# Patient Record
Sex: MALE | Race: WHITE | NOT HISPANIC OR LATINO | Employment: FULL TIME | URBAN - METROPOLITAN AREA
[De-identification: names, ages, dates, MRNs, and addresses within clinical notes are randomized per-mention and may not be internally consistent; named-entity substitution may affect disease eponyms.]

---

## 2017-01-13 ENCOUNTER — ALLSCRIPTS OFFICE VISIT (OUTPATIENT)
Dept: OTHER | Facility: OTHER | Age: 57
End: 2017-01-13

## 2017-06-13 DIAGNOSIS — M10.9 GOUT: ICD-10-CM

## 2017-06-13 DIAGNOSIS — M12.9 ARTHROPATHY: ICD-10-CM

## 2017-07-21 ENCOUNTER — ALLSCRIPTS OFFICE VISIT (OUTPATIENT)
Dept: OTHER | Facility: OTHER | Age: 57
End: 2017-07-21

## 2017-07-21 DIAGNOSIS — M75.81 OTHER SHOULDER LESIONS, RIGHT SHOULDER: ICD-10-CM

## 2017-11-21 ENCOUNTER — GENERIC CONVERSION - ENCOUNTER (OUTPATIENT)
Dept: OTHER | Facility: OTHER | Age: 57
End: 2017-11-21

## 2017-11-21 DIAGNOSIS — Z13.0 ENCOUNTER FOR SCREENING FOR DISEASES OF THE BLOOD AND BLOOD-FORMING ORGANS AND CERTAIN DISORDERS INVOLVING THE IMMUNE MECHANISM: ICD-10-CM

## 2017-11-21 DIAGNOSIS — Z12.5 ENCOUNTER FOR SCREENING FOR MALIGNANT NEOPLASM OF PROSTATE: ICD-10-CM

## 2017-11-21 DIAGNOSIS — M10.9 GOUT: ICD-10-CM

## 2017-11-21 DIAGNOSIS — R76.8 OTHER SPECIFIED ABNORMAL IMMUNOLOGICAL FINDINGS IN SERUM: ICD-10-CM

## 2017-11-21 DIAGNOSIS — M12.9 ARTHROPATHY: ICD-10-CM

## 2018-01-13 VITALS
WEIGHT: 260 LBS | HEIGHT: 71 IN | OXYGEN SATURATION: 97 % | HEART RATE: 80 BPM | DIASTOLIC BLOOD PRESSURE: 78 MMHG | SYSTOLIC BLOOD PRESSURE: 116 MMHG | BODY MASS INDEX: 36.4 KG/M2

## 2018-01-14 VITALS
HEIGHT: 71 IN | TEMPERATURE: 98.3 F | HEART RATE: 83 BPM | SYSTOLIC BLOOD PRESSURE: 140 MMHG | OXYGEN SATURATION: 95 % | WEIGHT: 262 LBS | DIASTOLIC BLOOD PRESSURE: 88 MMHG | RESPIRATION RATE: 14 BRPM | BODY MASS INDEX: 36.68 KG/M2

## 2018-01-22 VITALS
SYSTOLIC BLOOD PRESSURE: 138 MMHG | BODY MASS INDEX: 36.68 KG/M2 | HEART RATE: 98 BPM | OXYGEN SATURATION: 98 % | DIASTOLIC BLOOD PRESSURE: 90 MMHG | HEIGHT: 71 IN | WEIGHT: 262 LBS

## 2018-03-06 ENCOUNTER — TELEPHONE (OUTPATIENT)
Dept: FAMILY MEDICINE CLINIC | Facility: CLINIC | Age: 58
End: 2018-03-06

## 2018-03-08 DIAGNOSIS — G89.4 CHRONIC PAIN SYNDROME: Primary | ICD-10-CM

## 2018-03-08 RX ORDER — OXYCODONE HYDROCHLORIDE AND ACETAMINOPHEN 5; 325 MG/1; MG/1
1 TABLET ORAL EVERY 4 HOURS PRN
Qty: 50 TABLET | Refills: 0 | Status: SHIPPED | OUTPATIENT
Start: 2018-03-08 | End: 2018-04-30 | Stop reason: SDUPTHER

## 2018-03-08 RX ORDER — OXYCODONE HYDROCHLORIDE AND ACETAMINOPHEN 5; 325 MG/1; MG/1
1 TABLET ORAL
COMMUNITY
Start: 2016-09-20 | End: 2018-03-08 | Stop reason: SDUPTHER

## 2018-03-16 DIAGNOSIS — K21.9 GASTROESOPHAGEAL REFLUX DISEASE, ESOPHAGITIS PRESENCE NOT SPECIFIED: Primary | ICD-10-CM

## 2018-03-18 RX ORDER — OMEPRAZOLE 40 MG/1
CAPSULE, DELAYED RELEASE ORAL
Qty: 90 CAPSULE | Refills: 5 | Status: SHIPPED | OUTPATIENT
Start: 2018-03-18 | End: 2018-05-30 | Stop reason: SDUPTHER

## 2018-04-25 DIAGNOSIS — M10.9 GOUT, UNSPECIFIED CAUSE, UNSPECIFIED CHRONICITY, UNSPECIFIED SITE: Primary | ICD-10-CM

## 2018-04-26 RX ORDER — COLCHICINE 0.6 MG/1
TABLET, FILM COATED ORAL
Qty: 200 TABLET | Refills: 5 | Status: SHIPPED | OUTPATIENT
Start: 2018-04-26 | End: 2018-07-12 | Stop reason: SDUPTHER

## 2018-04-30 DIAGNOSIS — G89.4 CHRONIC PAIN SYNDROME: ICD-10-CM

## 2018-04-30 RX ORDER — OXYCODONE HYDROCHLORIDE AND ACETAMINOPHEN 5; 325 MG/1; MG/1
1 TABLET ORAL EVERY 4 HOURS PRN
Qty: 50 TABLET | Refills: 0 | Status: SHIPPED | OUTPATIENT
Start: 2018-04-30 | End: 2018-05-30 | Stop reason: SDUPTHER

## 2018-05-01 DIAGNOSIS — M25.50 ARTHRALGIA, UNSPECIFIED JOINT: Primary | ICD-10-CM

## 2018-05-01 RX ORDER — INDOMETHACIN 50 MG/1
50 CAPSULE ORAL EVERY 8 HOURS
Qty: 30 CAPSULE | Refills: 0 | Status: SHIPPED | OUTPATIENT
Start: 2018-05-01 | End: 2018-07-12 | Stop reason: SDUPTHER

## 2018-05-01 RX ORDER — INDOMETHACIN 50 MG/1
1 CAPSULE ORAL EVERY 8 HOURS
COMMUNITY
Start: 2015-09-14 | End: 2018-05-01 | Stop reason: SDUPTHER

## 2018-05-07 RX ORDER — NAPROXEN 500 MG/1
1 TABLET ORAL 2 TIMES DAILY
COMMUNITY
Start: 2017-07-21 | End: 2018-10-09 | Stop reason: SDUPTHER

## 2018-05-07 RX ORDER — LEVOCETIRIZINE DIHYDROCHLORIDE 5 MG/1
5 TABLET, FILM COATED ORAL EVERY EVENING
COMMUNITY
Start: 2018-03-20

## 2018-05-11 ENCOUNTER — OFFICE VISIT (OUTPATIENT)
Dept: FAMILY MEDICINE CLINIC | Facility: CLINIC | Age: 58
End: 2018-05-11
Payer: COMMERCIAL

## 2018-05-11 VITALS
TEMPERATURE: 97.9 F | BODY MASS INDEX: 38.05 KG/M2 | RESPIRATION RATE: 18 BRPM | WEIGHT: 269 LBS | HEART RATE: 90 BPM | OXYGEN SATURATION: 97 %

## 2018-05-11 DIAGNOSIS — M77.8 TENDINITIS OF RIGHT SHOULDER: Primary | ICD-10-CM

## 2018-05-11 DIAGNOSIS — R53.83 FATIGUE, UNSPECIFIED TYPE: ICD-10-CM

## 2018-05-11 DIAGNOSIS — Z12.5 PROSTATE CANCER SCREENING: ICD-10-CM

## 2018-05-11 DIAGNOSIS — E78.5 DYSLIPIDEMIA: ICD-10-CM

## 2018-05-11 DIAGNOSIS — K21.9 GASTROESOPHAGEAL REFLUX DISEASE, ESOPHAGITIS PRESENCE NOT SPECIFIED: ICD-10-CM

## 2018-05-11 DIAGNOSIS — M10.9 GOUT, UNSPECIFIED CAUSE, UNSPECIFIED CHRONICITY, UNSPECIFIED SITE: ICD-10-CM

## 2018-05-11 PROBLEM — J30.9 ALLERGIC RHINITIS: Status: ACTIVE | Noted: 2017-07-03

## 2018-05-11 PROBLEM — R76.8 ELEVATED RHEUMATOID FACTOR: Status: ACTIVE | Noted: 2017-11-21

## 2018-05-11 PROCEDURE — 96372 THER/PROPH/DIAG INJ SC/IM: CPT | Performed by: FAMILY MEDICINE

## 2018-05-11 PROCEDURE — 99214 OFFICE O/P EST MOD 30 MIN: CPT | Performed by: FAMILY MEDICINE

## 2018-05-11 PROCEDURE — 20610 DRAIN/INJ JOINT/BURSA W/O US: CPT | Performed by: FAMILY MEDICINE

## 2018-05-11 RX ORDER — CYANOCOBALAMIN 1000 UG/ML
1000 INJECTION INTRAMUSCULAR; SUBCUTANEOUS
Status: SHIPPED | OUTPATIENT
Start: 2018-05-11

## 2018-05-11 RX ORDER — METHYLPREDNISOLONE ACETATE 40 MG/ML
1 INJECTION, SUSPENSION INTRA-ARTICULAR; INTRALESIONAL; INTRAMUSCULAR; SOFT TISSUE
Status: COMPLETED | OUTPATIENT
Start: 2018-05-11 | End: 2018-05-11

## 2018-05-11 RX ADMIN — METHYLPREDNISOLONE ACETATE 1 ML: 40 INJECTION, SUSPENSION INTRA-ARTICULAR; INTRALESIONAL; INTRAMUSCULAR; SOFT TISSUE at 16:10

## 2018-05-11 RX ADMIN — CYANOCOBALAMIN 1000 MCG: 1000 INJECTION INTRAMUSCULAR; SUBCUTANEOUS at 16:43

## 2018-05-11 NOTE — PROGRESS NOTES
Assessment/Plan:           Problem List Items Addressed This Visit     Dyslipidemia    Relevant Orders    Comprehensive metabolic panel    Lipid panel    Gout     Continue Colcrys         Relevant Orders    Uric acid    Tendinitis of right shoulder    Acid reflux     Continue omeprazole         Fatigue - Primary    Relevant Orders    TSH, 3rd generation with T4 reflex    CBC and differential    Lyme Antibody Profile with reflex to WB      Other Visit Diagnoses     Prostate cancer screening        Relevant Orders    PSA, Total Screen            Subjective:      Patient ID: Skyler Shaver is a 66 y o  male  Patient comes in for checkup  He complains of a sore right shoulder and problems with fatigue  The following portions of the patient's history were reviewed and updated as appropriate: allergies, current medications, past family history, past medical history, past social history, past surgical history and problem list     Review of Systems   Constitutional: Positive for fatigue  Negative for fever  HENT: Negative  Respiratory: Negative  Cardiovascular: Negative  Objective:      Pulse 90   Temp 97 9 °F (36 6 °C)   Resp 18   Wt 122 kg (269 lb)   SpO2 97%   BMI 38 05 kg/m²          Physical Exam   Constitutional: He is oriented to person, place, and time  He appears well-developed and well-nourished  HENT:   Head: Normocephalic and atraumatic  Right Ear: Tympanic membrane normal    Left Ear: Tympanic membrane normal    Eyes: EOM are normal  Pupils are equal, round, and reactive to light  Neck: Neck supple  Cardiovascular: Normal rate, regular rhythm and normal heart sounds  Pulmonary/Chest: Effort normal and breath sounds normal    Abdominal: Soft  Bowel sounds are normal    Musculoskeletal: Normal range of motion  Painful abduction of right shoulder   Neurological: He is alert and oriented to person, place, and time  Skin: Skin is warm and dry     Psychiatric: He has a normal mood and affect  Thought content normal    Nursing note and vitals reviewed      Large joint arthrocentesis  Date/Time: 5/11/2018 4:10 PM  Consent given by: patient  Site marked: site marked  Timeout: Immediately prior to procedure a time out was called to verify the correct patient, procedure, equipment, support staff and site/side marked as required   Supporting Documentation  Indications: pain   Procedure Details  Location: shoulder - R glenohumeral  Preparation: Patient was prepped and draped in the usual sterile fashion  Needle size: 25 G  Ultrasound guidance: no  Approach: posterior  Medications administered: 1 mL methylPREDNISolone acetate 40 mg/mL; 1 mL methylPREDNISolone acetate 40 mg/mL    Patient tolerance: patient tolerated the procedure well with no immediate complications  Dressing:  Sterile dressing applied

## 2018-05-30 ENCOUNTER — TELEPHONE (OUTPATIENT)
Dept: FAMILY MEDICINE CLINIC | Facility: CLINIC | Age: 58
End: 2018-05-30

## 2018-05-30 DIAGNOSIS — K21.9 GASTROESOPHAGEAL REFLUX DISEASE, ESOPHAGITIS PRESENCE NOT SPECIFIED: ICD-10-CM

## 2018-05-30 DIAGNOSIS — G89.4 CHRONIC PAIN SYNDROME: ICD-10-CM

## 2018-05-30 RX ORDER — OMEPRAZOLE 40 MG/1
40 CAPSULE, DELAYED RELEASE ORAL DAILY
Qty: 90 CAPSULE | Refills: 3 | Status: SHIPPED | OUTPATIENT
Start: 2018-05-30 | End: 2019-05-01 | Stop reason: SDUPTHER

## 2018-05-30 RX ORDER — OXYCODONE HYDROCHLORIDE AND ACETAMINOPHEN 5; 325 MG/1; MG/1
1 TABLET ORAL EVERY 4 HOURS PRN
Qty: 50 TABLET | Refills: 0 | Status: SHIPPED | OUTPATIENT
Start: 2018-05-30 | End: 2018-06-01 | Stop reason: SDUPTHER

## 2018-05-30 NOTE — TELEPHONE ENCOUNTER
PDMP ok  Patient would like both rx's sent to [facility]  Let Елена know if this can be done   [phone]

## 2018-06-01 DIAGNOSIS — G89.4 CHRONIC PAIN SYNDROME: ICD-10-CM

## 2018-06-01 RX ORDER — OXYCODONE HYDROCHLORIDE AND ACETAMINOPHEN 5; 325 MG/1; MG/1
1 TABLET ORAL EVERY 4 HOURS PRN
Qty: 50 TABLET | Refills: 0 | Status: SHIPPED | OUTPATIENT
Start: 2018-06-01 | End: 2018-06-06 | Stop reason: SDUPTHER

## 2018-06-01 NOTE — TELEPHONE ENCOUNTER
Prescription for Percocet was printed on 5/30  We are unable to find it  Pt stopped by to p/u  Can you please send it to [address] [place] [address] in [facility]? Since we are not able to locate Rx, it was printed on 5/30 at 8:53 pm?    Please advise

## 2018-06-06 DIAGNOSIS — G89.4 CHRONIC PAIN SYNDROME: ICD-10-CM

## 2018-06-07 RX ORDER — OXYCODONE HYDROCHLORIDE AND ACETAMINOPHEN 5; 325 MG/1; MG/1
1 TABLET ORAL EVERY 4 HOURS PRN
Qty: 50 TABLET | Refills: 0 | Status: SHIPPED | OUTPATIENT
Start: 2018-06-07 | End: 2018-07-23 | Stop reason: SDUPTHER

## 2018-07-12 ENCOUNTER — TELEPHONE (OUTPATIENT)
Dept: FAMILY MEDICINE CLINIC | Facility: CLINIC | Age: 58
End: 2018-07-12

## 2018-07-12 DIAGNOSIS — M10.9 GOUT, UNSPECIFIED CAUSE, UNSPECIFIED CHRONICITY, UNSPECIFIED SITE: Primary | ICD-10-CM

## 2018-07-12 DIAGNOSIS — M25.50 ARTHRALGIA, UNSPECIFIED JOINT: ICD-10-CM

## 2018-07-12 RX ORDER — COLCHICINE 0.6 MG/1
TABLET ORAL
Qty: 200 TABLET | Refills: 0 | Status: SHIPPED | OUTPATIENT
Start: 2018-07-12 | End: 2019-06-29 | Stop reason: SDUPTHER

## 2018-07-12 RX ORDER — INDOMETHACIN 50 MG/1
50 CAPSULE ORAL
Qty: 30 CAPSULE | Refills: 3 | Status: SHIPPED | OUTPATIENT
Start: 2018-07-12 | End: 2019-09-10

## 2018-07-23 DIAGNOSIS — G89.4 CHRONIC PAIN SYNDROME: ICD-10-CM

## 2018-07-23 RX ORDER — OXYCODONE HYDROCHLORIDE AND ACETAMINOPHEN 5; 325 MG/1; MG/1
1 TABLET ORAL EVERY 4 HOURS PRN
Qty: 50 TABLET | Refills: 0 | Status: SHIPPED | OUTPATIENT
Start: 2018-07-23 | End: 2018-07-24 | Stop reason: SDUPTHER

## 2018-07-24 DIAGNOSIS — G89.4 CHRONIC PAIN SYNDROME: ICD-10-CM

## 2018-07-24 RX ORDER — OXYCODONE HYDROCHLORIDE AND ACETAMINOPHEN 5; 325 MG/1; MG/1
1 TABLET ORAL EVERY 4 HOURS PRN
Qty: 50 TABLET | Refills: 0 | Status: SHIPPED | OUTPATIENT
Start: 2018-07-24 | End: 2018-09-04 | Stop reason: SDUPTHER

## 2018-09-04 DIAGNOSIS — G89.4 CHRONIC PAIN SYNDROME: ICD-10-CM

## 2018-09-04 RX ORDER — OXYCODONE HYDROCHLORIDE AND ACETAMINOPHEN 5; 325 MG/1; MG/1
1 TABLET ORAL EVERY 4 HOURS PRN
Qty: 50 TABLET | Refills: 0 | Status: SHIPPED | OUTPATIENT
Start: 2018-09-04 | End: 2018-10-09 | Stop reason: SDUPTHER

## 2018-09-18 ENCOUNTER — TELEPHONE (OUTPATIENT)
Dept: FAMILY MEDICINE CLINIC | Facility: CLINIC | Age: 58
End: 2018-09-18

## 2018-09-20 ENCOUNTER — OFFICE VISIT (OUTPATIENT)
Dept: FAMILY MEDICINE CLINIC | Facility: CLINIC | Age: 58
End: 2018-09-20
Payer: COMMERCIAL

## 2018-09-20 VITALS
WEIGHT: 262 LBS | SYSTOLIC BLOOD PRESSURE: 130 MMHG | HEART RATE: 76 BPM | OXYGEN SATURATION: 94 % | BODY MASS INDEX: 37.51 KG/M2 | DIASTOLIC BLOOD PRESSURE: 88 MMHG | TEMPERATURE: 98.8 F | HEIGHT: 70 IN

## 2018-09-20 DIAGNOSIS — J30.9 ALLERGIC RHINITIS, UNSPECIFIED SEASONALITY, UNSPECIFIED TRIGGER: ICD-10-CM

## 2018-09-20 DIAGNOSIS — M10.9 GOUT, UNSPECIFIED CAUSE, UNSPECIFIED CHRONICITY, UNSPECIFIED SITE: ICD-10-CM

## 2018-09-20 DIAGNOSIS — M77.8 TENDINITIS OF RIGHT SHOULDER: Primary | ICD-10-CM

## 2018-09-20 DIAGNOSIS — M77.8 TENDONITIS OF SHOULDER, LEFT: ICD-10-CM

## 2018-09-20 DIAGNOSIS — K21.9 GASTROESOPHAGEAL REFLUX DISEASE, ESOPHAGITIS PRESENCE NOT SPECIFIED: ICD-10-CM

## 2018-09-20 PROCEDURE — 20610 DRAIN/INJ JOINT/BURSA W/O US: CPT | Performed by: FAMILY MEDICINE

## 2018-09-20 PROCEDURE — 99214 OFFICE O/P EST MOD 30 MIN: CPT | Performed by: FAMILY MEDICINE

## 2018-09-20 PROCEDURE — 3008F BODY MASS INDEX DOCD: CPT | Performed by: FAMILY MEDICINE

## 2018-09-20 RX ORDER — METHYLPREDNISOLONE ACETATE 40 MG/ML
1 INJECTION, SUSPENSION INTRA-ARTICULAR; INTRALESIONAL; INTRAMUSCULAR; SOFT TISSUE
Status: COMPLETED | OUTPATIENT
Start: 2018-09-20 | End: 2018-09-20

## 2018-09-20 RX ADMIN — METHYLPREDNISOLONE ACETATE 1 ML: 40 INJECTION, SUSPENSION INTRA-ARTICULAR; INTRALESIONAL; INTRAMUSCULAR; SOFT TISSUE at 17:57

## 2018-09-20 NOTE — PROGRESS NOTES
Assessment/Plan:    Return visit in 6 months       Problem List Items Addressed This Visit     Gout     Continue colchicine 0 6 mg b i d  Tendinitis of right shoulder - Primary    Relevant Orders    XR shoulder 2+ vw right    Allergic rhinitis     Continue Xyzal 5 mg daily         Acid reflux     Continue Prilosec 40 mg daily         Tendonitis of shoulder, left    Relevant Orders    XR shoulder 2+ vw left            Subjective:      Patient ID: Stevan Bartlett is a 66 y o  male  Patient comes in for checkup  He complains of bilateral shoulder pain  He got good relief from right shoulder pain with cortisone injection and he wishes both his shoulders to be injected  The following portions of the patient's history were reviewed and updated as appropriate: allergies, current medications, past family history, past medical history, past social history, past surgical history and problem list     Review of Systems   Constitutional: Negative  HENT: Negative  Respiratory: Negative  Cardiovascular: Negative  Objective:      /88   Pulse 76   Temp 98 8 °F (37 1 °C)   Ht 5' 10" (1 778 m)   Wt 119 kg (262 lb)   SpO2 94%   BMI 37 59 kg/m²            Physical Exam   Constitutional: He is oriented to person, place, and time  He appears well-developed and well-nourished  HENT:   Head: Normocephalic and atraumatic  Right Ear: Tympanic membrane normal    Left Ear: Tympanic membrane normal    Eyes: EOM are normal  Pupils are equal, round, and reactive to light  Neck: Neck supple  Cardiovascular: Normal rate, regular rhythm and normal heart sounds  Pulmonary/Chest: Effort normal and breath sounds normal    Abdominal: Soft  Bowel sounds are normal    Musculoskeletal: Normal range of motion  Painful abduction and flexion of shoulders  Neurological: He is alert and oriented to person, place, and time  Skin: Skin is warm and dry  Psychiatric: He has a normal mood and affect  Thought content normal      Large joint arthrocentesis  Date/Time: 9/20/2018 5:57 PM  Consent given by: patient  Site marked: site marked  Timeout: Immediately prior to procedure a time out was called to verify the correct patient, procedure, equipment, support staff and site/side marked as required   Supporting Documentation  Indications: pain   Procedure Details  Location: shoulder - R glenohumeral  Preparation: Patient was prepped and draped in the usual sterile fashion  Needle size: 25 G  Approach: posterior  Medications administered: 1 mL methylPREDNISolone acetate 40 mg/mL    Patient tolerance: patient tolerated the procedure well with no immediate complications  Dressing:  Sterile dressing applied

## 2018-10-09 DIAGNOSIS — M54.5 LOW BACK PAIN, UNSPECIFIED BACK PAIN LATERALITY, UNSPECIFIED CHRONICITY, WITH SCIATICA PRESENCE UNSPECIFIED: ICD-10-CM

## 2018-10-09 DIAGNOSIS — G89.4 CHRONIC PAIN SYNDROME: ICD-10-CM

## 2018-10-09 DIAGNOSIS — M54.5 LOW BACK PAIN, UNSPECIFIED BACK PAIN LATERALITY, UNSPECIFIED CHRONICITY, WITH SCIATICA PRESENCE UNSPECIFIED: Primary | ICD-10-CM

## 2018-10-09 RX ORDER — OXYCODONE HYDROCHLORIDE AND ACETAMINOPHEN 5; 325 MG/1; MG/1
1 TABLET ORAL EVERY 4 HOURS PRN
Qty: 50 TABLET | Refills: 0 | Status: SHIPPED | OUTPATIENT
Start: 2018-10-09 | End: 2018-11-13 | Stop reason: SDUPTHER

## 2018-10-09 RX ORDER — NAPROXEN 500 MG/1
500 TABLET ORAL 2 TIMES DAILY WITH MEALS
Qty: 180 TABLET | Refills: 3 | Status: SHIPPED | OUTPATIENT
Start: 2018-10-09 | End: 2019-05-13 | Stop reason: SDUPTHER

## 2018-10-09 RX ORDER — NAPROXEN 500 MG/1
TABLET ORAL
Qty: 180 TABLET | Refills: 3 | Status: SHIPPED | OUTPATIENT
Start: 2018-10-09 | End: 2018-10-09 | Stop reason: SDUPTHER

## 2018-10-09 RX ORDER — OXYCODONE HYDROCHLORIDE AND ACETAMINOPHEN 5; 325 MG/1; MG/1
1 TABLET ORAL EVERY 4 HOURS PRN
Qty: 50 TABLET | Refills: 0 | Status: SHIPPED | OUTPATIENT
Start: 2018-10-09 | End: 2018-10-09 | Stop reason: SDUPTHER

## 2018-11-13 DIAGNOSIS — G89.4 CHRONIC PAIN SYNDROME: ICD-10-CM

## 2018-11-13 RX ORDER — OXYCODONE HYDROCHLORIDE AND ACETAMINOPHEN 5; 325 MG/1; MG/1
1 TABLET ORAL EVERY 4 HOURS PRN
Qty: 50 TABLET | Refills: 0 | Status: SHIPPED | OUTPATIENT
Start: 2018-11-13 | End: 2018-11-13 | Stop reason: SDUPTHER

## 2018-11-13 RX ORDER — OXYCODONE HYDROCHLORIDE AND ACETAMINOPHEN 5; 325 MG/1; MG/1
1 TABLET ORAL EVERY 4 HOURS PRN
Qty: 50 TABLET | Refills: 0 | Status: SHIPPED | OUTPATIENT
Start: 2018-11-13 | End: 2018-12-14 | Stop reason: SDUPTHER

## 2018-11-13 NOTE — TELEPHONE ENCOUNTER
Pt's Oxycodone went [facility]  Please send it to [facility] in [facility]

## 2018-12-14 DIAGNOSIS — G89.4 CHRONIC PAIN SYNDROME: ICD-10-CM

## 2018-12-14 RX ORDER — OXYCODONE HYDROCHLORIDE AND ACETAMINOPHEN 5; 325 MG/1; MG/1
1 TABLET ORAL EVERY 4 HOURS PRN
Qty: 50 TABLET | Refills: 0 | Status: SHIPPED | OUTPATIENT
Start: 2018-12-14 | End: 2019-01-16 | Stop reason: SDUPTHER

## 2019-01-16 DIAGNOSIS — G89.4 CHRONIC PAIN SYNDROME: ICD-10-CM

## 2019-01-16 RX ORDER — OXYCODONE HYDROCHLORIDE AND ACETAMINOPHEN 5; 325 MG/1; MG/1
1 TABLET ORAL EVERY 4 HOURS PRN
Qty: 50 TABLET | Refills: 0 | Status: SHIPPED | OUTPATIENT
Start: 2019-01-16 | End: 2019-02-18 | Stop reason: SDUPTHER

## 2019-02-18 DIAGNOSIS — G89.4 CHRONIC PAIN SYNDROME: ICD-10-CM

## 2019-02-18 RX ORDER — OXYCODONE HYDROCHLORIDE AND ACETAMINOPHEN 5; 325 MG/1; MG/1
1 TABLET ORAL EVERY 4 HOURS PRN
Qty: 50 TABLET | Refills: 0 | Status: SHIPPED | OUTPATIENT
Start: 2019-02-18 | End: 2019-02-22 | Stop reason: SDUPTHER

## 2019-02-22 DIAGNOSIS — G89.4 CHRONIC PAIN SYNDROME: ICD-10-CM

## 2019-02-22 RX ORDER — OXYCODONE HYDROCHLORIDE AND ACETAMINOPHEN 5; 325 MG/1; MG/1
1 TABLET ORAL EVERY 4 HOURS PRN
Qty: 50 TABLET | Refills: 0 | Status: SHIPPED | OUTPATIENT
Start: 2019-02-22 | End: 2019-04-13 | Stop reason: SDUPTHER

## 2019-04-13 ENCOUNTER — TELEPHONE (OUTPATIENT)
Dept: FAMILY MEDICINE CLINIC | Facility: CLINIC | Age: 59
End: 2019-04-13

## 2019-04-13 DIAGNOSIS — G89.4 CHRONIC PAIN SYNDROME: ICD-10-CM

## 2019-04-14 RX ORDER — OXYCODONE HYDROCHLORIDE AND ACETAMINOPHEN 5; 325 MG/1; MG/1
1 TABLET ORAL EVERY 4 HOURS PRN
Qty: 50 TABLET | Refills: 0 | Status: SHIPPED | OUTPATIENT
Start: 2019-04-14 | End: 2019-04-18 | Stop reason: SDUPTHER

## 2019-04-15 ENCOUNTER — OFFICE VISIT (OUTPATIENT)
Dept: FAMILY MEDICINE CLINIC | Facility: CLINIC | Age: 59
End: 2019-04-15
Payer: COMMERCIAL

## 2019-04-15 VITALS
HEART RATE: 90 BPM | BODY MASS INDEX: 37.51 KG/M2 | DIASTOLIC BLOOD PRESSURE: 90 MMHG | WEIGHT: 262 LBS | HEIGHT: 70 IN | RESPIRATION RATE: 16 BRPM | TEMPERATURE: 98.4 F | OXYGEN SATURATION: 95 % | SYSTOLIC BLOOD PRESSURE: 130 MMHG

## 2019-04-15 DIAGNOSIS — M77.8 TENDONITIS OF SHOULDER, LEFT: ICD-10-CM

## 2019-04-15 DIAGNOSIS — M77.8 TENDINITIS OF RIGHT SHOULDER: ICD-10-CM

## 2019-04-15 DIAGNOSIS — G25.81 RLS (RESTLESS LEGS SYNDROME): ICD-10-CM

## 2019-04-15 DIAGNOSIS — K21.9 GASTROESOPHAGEAL REFLUX DISEASE, ESOPHAGITIS PRESENCE NOT SPECIFIED: ICD-10-CM

## 2019-04-15 DIAGNOSIS — R35.1 NOCTURIA: Primary | ICD-10-CM

## 2019-04-15 PROCEDURE — 20610 DRAIN/INJ JOINT/BURSA W/O US: CPT | Performed by: FAMILY MEDICINE

## 2019-04-15 PROCEDURE — 99214 OFFICE O/P EST MOD 30 MIN: CPT | Performed by: FAMILY MEDICINE

## 2019-04-15 RX ORDER — BACITRACIN 500 UNIT/G
OINTMENT (GRAM) TOPICAL
Qty: 60 CAPSULE | Refills: 0 | Status: SHIPPED | OUTPATIENT
Start: 2019-04-15 | End: 2020-10-23 | Stop reason: ALTCHOICE

## 2019-04-15 RX ORDER — METHYLPREDNISOLONE ACETATE 40 MG/ML
1 INJECTION, SUSPENSION INTRA-ARTICULAR; INTRALESIONAL; INTRAMUSCULAR; SOFT TISSUE
Status: COMPLETED | OUTPATIENT
Start: 2019-04-15 | End: 2019-04-15

## 2019-04-15 RX ADMIN — METHYLPREDNISOLONE ACETATE 1 ML: 40 INJECTION, SUSPENSION INTRA-ARTICULAR; INTRALESIONAL; INTRAMUSCULAR; SOFT TISSUE at 16:29

## 2019-04-15 RX ADMIN — METHYLPREDNISOLONE ACETATE 1 ML: 40 INJECTION, SUSPENSION INTRA-ARTICULAR; INTRALESIONAL; INTRAMUSCULAR; SOFT TISSUE at 16:30

## 2019-04-18 ENCOUNTER — TELEPHONE (OUTPATIENT)
Dept: FAMILY MEDICINE CLINIC | Facility: CLINIC | Age: 59
End: 2019-04-18

## 2019-04-18 DIAGNOSIS — J30.9 ALLERGIC RHINITIS, UNSPECIFIED SEASONALITY, UNSPECIFIED TRIGGER: Primary | ICD-10-CM

## 2019-04-18 RX ORDER — OXYCODONE HYDROCHLORIDE AND ACETAMINOPHEN 5; 325 MG/1; MG/1
1 TABLET ORAL EVERY 4 HOURS PRN
Qty: 50 TABLET | Refills: 0 | Status: SHIPPED | OUTPATIENT
Start: 2019-04-18 | End: 2019-05-13 | Stop reason: SDUPTHER

## 2019-04-18 RX ORDER — LORATADINE 10 MG/1
10 TABLET ORAL DAILY
Qty: 90 TABLET | Refills: 3 | Status: SHIPPED | OUTPATIENT
Start: 2019-04-18 | End: 2020-04-02

## 2019-05-01 DIAGNOSIS — K21.9 GASTROESOPHAGEAL REFLUX DISEASE, ESOPHAGITIS PRESENCE NOT SPECIFIED: ICD-10-CM

## 2019-05-01 RX ORDER — OMEPRAZOLE 40 MG/1
CAPSULE, DELAYED RELEASE ORAL
Qty: 90 CAPSULE | Refills: 5 | Status: SHIPPED | OUTPATIENT
Start: 2019-05-01 | End: 2020-07-24

## 2019-05-13 ENCOUNTER — TELEPHONE (OUTPATIENT)
Dept: FAMILY MEDICINE CLINIC | Facility: CLINIC | Age: 59
End: 2019-05-13

## 2019-05-13 DIAGNOSIS — M54.5 LOW BACK PAIN, UNSPECIFIED BACK PAIN LATERALITY, UNSPECIFIED CHRONICITY, WITH SCIATICA PRESENCE UNSPECIFIED: ICD-10-CM

## 2019-05-13 DIAGNOSIS — G89.4 CHRONIC PAIN SYNDROME: ICD-10-CM

## 2019-05-13 RX ORDER — OXYCODONE HYDROCHLORIDE AND ACETAMINOPHEN 5; 325 MG/1; MG/1
1 TABLET ORAL EVERY 4 HOURS PRN
Qty: 50 TABLET | Refills: 0 | Status: SHIPPED | OUTPATIENT
Start: 2019-05-13 | End: 2019-06-14 | Stop reason: SDUPTHER

## 2019-05-13 RX ORDER — NAPROXEN 500 MG/1
500 TABLET ORAL 2 TIMES DAILY WITH MEALS
Qty: 180 TABLET | Refills: 3 | Status: SHIPPED | OUTPATIENT
Start: 2019-05-13 | End: 2020-08-07

## 2019-06-14 DIAGNOSIS — G89.4 CHRONIC PAIN SYNDROME: ICD-10-CM

## 2019-06-14 RX ORDER — OXYCODONE HYDROCHLORIDE AND ACETAMINOPHEN 5; 325 MG/1; MG/1
1 TABLET ORAL EVERY 4 HOURS PRN
Qty: 50 TABLET | Refills: 0 | Status: SHIPPED | OUTPATIENT
Start: 2019-06-14 | End: 2019-07-17 | Stop reason: SDUPTHER

## 2019-06-29 DIAGNOSIS — M10.9 GOUT, UNSPECIFIED CAUSE, UNSPECIFIED CHRONICITY, UNSPECIFIED SITE: ICD-10-CM

## 2019-06-29 RX ORDER — COLCHICINE 0.6 MG/1
TABLET ORAL
Qty: 200 TABLET | Refills: 5 | Status: SHIPPED | OUTPATIENT
Start: 2019-06-29 | End: 2020-08-07

## 2019-07-17 DIAGNOSIS — G89.4 CHRONIC PAIN SYNDROME: ICD-10-CM

## 2019-07-17 RX ORDER — OXYCODONE HYDROCHLORIDE AND ACETAMINOPHEN 5; 325 MG/1; MG/1
1 TABLET ORAL EVERY 4 HOURS PRN
Qty: 50 TABLET | Refills: 0 | Status: SHIPPED | OUTPATIENT
Start: 2019-07-17 | End: 2019-08-14 | Stop reason: SDUPTHER

## 2019-08-14 DIAGNOSIS — G89.4 CHRONIC PAIN SYNDROME: ICD-10-CM

## 2019-08-14 RX ORDER — OXYCODONE HYDROCHLORIDE AND ACETAMINOPHEN 5; 325 MG/1; MG/1
1 TABLET ORAL EVERY 4 HOURS PRN
Qty: 50 TABLET | Refills: 0 | Status: SHIPPED | OUTPATIENT
Start: 2019-08-14 | End: 2019-09-10 | Stop reason: SDUPTHER

## 2019-09-10 ENCOUNTER — OFFICE VISIT (OUTPATIENT)
Dept: FAMILY MEDICINE CLINIC | Facility: CLINIC | Age: 59
End: 2019-09-10
Payer: COMMERCIAL

## 2019-09-10 VITALS
WEIGHT: 256 LBS | HEIGHT: 70 IN | BODY MASS INDEX: 36.65 KG/M2 | HEART RATE: 86 BPM | DIASTOLIC BLOOD PRESSURE: 90 MMHG | TEMPERATURE: 98.2 F | RESPIRATION RATE: 16 BRPM | SYSTOLIC BLOOD PRESSURE: 130 MMHG | OXYGEN SATURATION: 93 %

## 2019-09-10 DIAGNOSIS — G89.4 CHRONIC PAIN SYNDROME: ICD-10-CM

## 2019-09-10 DIAGNOSIS — Z13.220 SCREENING FOR HYPERLIPIDEMIA: ICD-10-CM

## 2019-09-10 DIAGNOSIS — M77.8 TENDONITIS OF SHOULDER, LEFT: ICD-10-CM

## 2019-09-10 DIAGNOSIS — Z11.59 NEED FOR HEPATITIS C SCREENING TEST: ICD-10-CM

## 2019-09-10 DIAGNOSIS — Z13.1 SCREENING FOR DIABETES MELLITUS (DM): ICD-10-CM

## 2019-09-10 DIAGNOSIS — G25.81 RLS (RESTLESS LEGS SYNDROME): ICD-10-CM

## 2019-09-10 DIAGNOSIS — Z00.00 HEALTH MAINTENANCE EXAMINATION: Primary | ICD-10-CM

## 2019-09-10 DIAGNOSIS — M77.8 TENDINITIS OF RIGHT SHOULDER: ICD-10-CM

## 2019-09-10 DIAGNOSIS — K21.9 GASTROESOPHAGEAL REFLUX DISEASE, ESOPHAGITIS PRESENCE NOT SPECIFIED: ICD-10-CM

## 2019-09-10 DIAGNOSIS — M10.9 GOUT, UNSPECIFIED CAUSE, UNSPECIFIED CHRONICITY, UNSPECIFIED SITE: ICD-10-CM

## 2019-09-10 DIAGNOSIS — Z13.0 SCREENING FOR DEFICIENCY ANEMIA: ICD-10-CM

## 2019-09-10 DIAGNOSIS — M25.50 ARTHRALGIA, UNSPECIFIED JOINT: ICD-10-CM

## 2019-09-10 DIAGNOSIS — Z12.5 PROSTATE CANCER SCREENING: ICD-10-CM

## 2019-09-10 PROCEDURE — 99396 PREV VISIT EST AGE 40-64: CPT | Performed by: FAMILY MEDICINE

## 2019-09-10 RX ORDER — OXYCODONE HYDROCHLORIDE AND ACETAMINOPHEN 5; 325 MG/1; MG/1
1 TABLET ORAL EVERY 4 HOURS PRN
Qty: 50 TABLET | Refills: 0 | Status: SHIPPED | OUTPATIENT
Start: 2019-09-10 | End: 2019-10-09 | Stop reason: SDUPTHER

## 2019-09-10 RX ORDER — ROPINIROLE 1 MG/1
1 TABLET, FILM COATED ORAL
Qty: 30 TABLET | Refills: 5 | Status: SHIPPED | OUTPATIENT
Start: 2019-09-10 | End: 2019-10-03 | Stop reason: SDUPTHER

## 2019-09-10 RX ORDER — INDOMETHACIN 50 MG/1
50 CAPSULE ORAL
Qty: 30 CAPSULE | Refills: 3
Start: 2019-09-10 | End: 2019-11-04 | Stop reason: SDUPTHER

## 2019-09-10 NOTE — PATIENT INSTRUCTIONS

## 2019-09-10 NOTE — PROGRESS NOTES
Assessment/Plan:    Return visit in 6 months  He is current with colonoscopy   Diagnoses and all orders for this visit:    Health maintenance examination    Gout, unspecified cause, unspecified chronicity, unspecified site  -     Uric acid; Future    Gastroesophageal reflux disease, esophagitis presence not specified    Tendinitis of right shoulder    Tendonitis of shoulder, left    RLS (restless legs syndrome)  -     rOPINIRole (REQUIP) 1 mg tablet; Take 1 tablet (1 mg total) by mouth daily at bedtime    Arthralgia, unspecified joint  -     indomethacin (INDOCIN) 50 mg capsule; Take 1 capsule (50 mg total) by mouth 3 (three) times a day with meals    Screening for deficiency anemia  -     CBC and differential; Future    Screening for hyperlipidemia  -     Lipid panel; Future    Screening for diabetes mellitus (DM)  -     Comprehensive metabolic panel; Future    Prostate cancer screening  -     PSA, Total Screen; Future    Need for hepatitis C screening test  -     Hepatitis C antibody; Future    Chronic pain syndrome  -     oxyCODONE-acetaminophen (PERCOCET) 5-325 mg per tablet; Take 1 tablet by mouth every 4 (four) hours as needed for moderate painMax Daily Amount: 6 tablets        Acid reflux  Continue Prilosec 40 mg daily    Tendonitis of shoulder, left  Continue Naprosyn    Gout  Continue colchicine 0 6 mg daily and indomethacin as needed        Subjective:      Patient ID: Mey Torres is a 64 y o  male  Patient comes in for annual physical   He complains of chronic shoulder pain and chronic low back pain  He also complains of restless leg syndrome  The following portions of the patient's history were reviewed and updated as appropriate:   He has a past medical history of Gout ,  does not have any pertinent problems on file  ,   has a past surgical history that includes Cataract extraction; Knee surgery; and Rotator cuff repair  ,  family history includes Esophageal cancer in his father; Lung cancer in his father; Pancreatic cancer in his father; Stroke in his family; Thyroid nodules in his father  ,   reports that he has been smoking cigars  He has never used smokeless tobacco  He reports that he drinks alcohol  He reports that he does not use drugs  ,  has No Known Allergies     Current Outpatient Medications   Medication Sig Dispense Refill    colchicine (COLCRYS) 0 6 mg tablet TAKE 1 TABLET TWICE A DAY AND EVERY 3 HOURS AS NEEDED FOR GOUT AS DIRECTED 200 tablet 5    indomethacin (INDOCIN) 50 mg capsule Take 1 capsule (50 mg total) by mouth 3 (three) times a day with meals 30 capsule 3    levocetirizine (XYZAL) 5 MG tablet       loratadine (CLARITIN) 10 mg tablet Take 1 tablet (10 mg total) by mouth daily 90 tablet 3    naproxen (NAPROSYN) 500 mg tablet Take 1 tablet (500 mg total) by mouth 2 (two) times a day with meals 180 tablet 3    omeprazole (PriLOSEC) 40 MG capsule TAKE 1 CAPSULE DAILY 90 capsule 5    oxyCODONE-acetaminophen (PERCOCET) 5-325 mg per tablet Take 1 tablet by mouth every 4 (four) hours as needed for moderate painMax Daily Amount: 6 tablets 50 tablet 0    rOPINIRole (REQUIP) 1 mg tablet Take 1 tablet (1 mg total) by mouth daily at bedtime 30 tablet 5    Saw Palmetto 160 MG CAPS 1 bid 60 capsule 0     Current Facility-Administered Medications   Medication Dose Route Frequency Provider Last Rate Last Dose    cyanocobalamin injection 1,000 mcg  1,000 mcg Intramuscular Q30 Days Irineo Curling, MD   1,000 mcg at 05/11/18 1643       Review of Systems   Constitutional: Negative  HENT: Negative  Respiratory: Negative  Cardiovascular: Negative  Endocrine: Negative  Musculoskeletal: Positive for arthralgias and back pain  Allergic/Immunologic: Positive for environmental allergies  Neurological: Negative  Hematological: Negative  Psychiatric/Behavioral: Negative            Objective:  Vitals:    09/10/19 1657   BP: 130/90   Pulse: 86   Resp: 16   Temp: 98 2 °F (36 8 °C) SpO2: 93%   Weight: 116 kg (256 lb)   Height: 5' 10" (1 778 m)     Body mass index is 36 73 kg/m²  Physical Exam   Constitutional: He is oriented to person, place, and time  He appears well-developed and well-nourished  HENT:   Head: Normocephalic and atraumatic  Right Ear: Tympanic membrane and external ear normal    Left Ear: Tympanic membrane and external ear normal    Mouth/Throat: Oropharynx is clear and moist    Eyes: Pupils are equal, round, and reactive to light  EOM are normal    Neck: Neck supple  Cardiovascular: Normal rate, regular rhythm and normal heart sounds  Pulmonary/Chest: Effort normal and breath sounds normal    Abdominal: Soft  Bowel sounds are normal    Musculoskeletal: Normal range of motion  Neurological: He is alert and oriented to person, place, and time  Skin: Skin is warm and dry  Psychiatric: He has a normal mood and affect  Thought content normal      BMI Counseling: Body mass index is 36 73 kg/m²  The BMI is above normal  Nutrition recommendations include decreasing overall calorie intake

## 2019-09-19 DIAGNOSIS — M10.9 GOUT, UNSPECIFIED CAUSE, UNSPECIFIED CHRONICITY, UNSPECIFIED SITE: ICD-10-CM

## 2019-09-19 DIAGNOSIS — R97.20 RISING PSA LEVEL: Primary | ICD-10-CM

## 2019-09-19 LAB
ALBUMIN SERPL-MCNC: 4.5 G/DL (ref 3.5–5.5)
ALBUMIN/GLOB SERPL: 2 {RATIO} (ref 1.2–2.2)
ALP SERPL-CCNC: 61 IU/L (ref 39–117)
ALT SERPL-CCNC: 26 IU/L (ref 0–44)
AST SERPL-CCNC: 24 IU/L (ref 0–40)
BASOPHILS # BLD AUTO: 0.1 X10E3/UL (ref 0–0.2)
BASOPHILS NFR BLD AUTO: 1 %
BILIRUB SERPL-MCNC: 0.6 MG/DL (ref 0–1.2)
BUN SERPL-MCNC: 12 MG/DL (ref 6–24)
BUN/CREAT SERPL: 13 (ref 9–20)
CALCIUM SERPL-MCNC: 9.4 MG/DL (ref 8.7–10.2)
CHLORIDE SERPL-SCNC: 104 MMOL/L (ref 96–106)
CHOLEST SERPL-MCNC: 163 MG/DL (ref 100–199)
CO2 SERPL-SCNC: 23 MMOL/L (ref 20–29)
CREAT SERPL-MCNC: 0.96 MG/DL (ref 0.76–1.27)
EOSINOPHIL # BLD AUTO: 0.4 X10E3/UL (ref 0–0.4)
EOSINOPHIL NFR BLD AUTO: 5 %
ERYTHROCYTE [DISTWIDTH] IN BLOOD BY AUTOMATED COUNT: 13.4 % (ref 12.3–15.4)
GLOBULIN SER-MCNC: 2.3 G/DL (ref 1.5–4.5)
GLUCOSE SERPL-MCNC: 95 MG/DL (ref 65–99)
HCT VFR BLD AUTO: 45.3 % (ref 37.5–51)
HCV AB S/CO SERPL IA: <0.1 S/CO RATIO (ref 0–0.9)
HDLC SERPL-MCNC: 36 MG/DL
HGB BLD-MCNC: 15.3 G/DL (ref 13–17.7)
IMM GRANULOCYTES # BLD: 0 X10E3/UL (ref 0–0.1)
IMM GRANULOCYTES NFR BLD: 0 %
LDLC SERPL CALC-MCNC: 99 MG/DL (ref 0–99)
LYMPHOCYTES # BLD AUTO: 1.7 X10E3/UL (ref 0.7–3.1)
LYMPHOCYTES NFR BLD AUTO: 23 %
MCH RBC QN AUTO: 30.1 PG (ref 26.6–33)
MCHC RBC AUTO-ENTMCNC: 33.8 G/DL (ref 31.5–35.7)
MCV RBC AUTO: 89 FL (ref 79–97)
MONOCYTES # BLD AUTO: 0.7 X10E3/UL (ref 0.1–0.9)
MONOCYTES NFR BLD AUTO: 10 %
NEUTROPHILS # BLD AUTO: 4.6 X10E3/UL (ref 1.4–7)
NEUTROPHILS NFR BLD AUTO: 61 %
PLATELET # BLD AUTO: 233 X10E3/UL (ref 150–450)
POTASSIUM SERPL-SCNC: 4.2 MMOL/L (ref 3.5–5.2)
PROT SERPL-MCNC: 6.8 G/DL (ref 6–8.5)
PSA SERPL-MCNC: 2 NG/ML (ref 0–4)
RBC # BLD AUTO: 5.09 X10E6/UL (ref 4.14–5.8)
SL AMB EGFR AFRICAN AMERICAN: 101 ML/MIN/1.73
SL AMB EGFR NON AFRICAN AMERICAN: 87 ML/MIN/1.73
SL AMB VLDL CHOLESTEROL CALC: 28 MG/DL (ref 5–40)
SODIUM SERPL-SCNC: 141 MMOL/L (ref 134–144)
TRIGL SERPL-MCNC: 138 MG/DL (ref 0–149)
URATE SERPL-MCNC: 8.9 MG/DL (ref 3.7–8.6)
WBC # BLD AUTO: 7.4 X10E3/UL (ref 3.4–10.8)

## 2019-09-19 RX ORDER — ALLOPURINOL 300 MG/1
300 TABLET ORAL DAILY
Qty: 90 TABLET | Refills: 3 | Status: SHIPPED | OUTPATIENT
Start: 2019-09-19 | End: 2020-11-06

## 2019-10-03 DIAGNOSIS — G25.81 RLS (RESTLESS LEGS SYNDROME): ICD-10-CM

## 2019-10-03 RX ORDER — ROPINIROLE 1 MG/1
1 TABLET, FILM COATED ORAL
Qty: 30 TABLET | Refills: 2 | Status: SHIPPED | OUTPATIENT
Start: 2019-10-03 | End: 2019-10-03 | Stop reason: SDUPTHER

## 2019-10-03 NOTE — TELEPHONE ENCOUNTER
Received a fax from the pharmacy requesting a 90day supply instead of the 30day to take full advantage of the pts insurance plan   m

## 2019-10-07 RX ORDER — ROPINIROLE 1 MG/1
1 TABLET, FILM COATED ORAL
Qty: 90 TABLET | Refills: 1 | Status: SHIPPED | OUTPATIENT
Start: 2019-10-07 | End: 2020-08-07

## 2019-10-09 DIAGNOSIS — G89.4 CHRONIC PAIN SYNDROME: ICD-10-CM

## 2019-10-09 RX ORDER — OXYCODONE HYDROCHLORIDE AND ACETAMINOPHEN 5; 325 MG/1; MG/1
1 TABLET ORAL EVERY 4 HOURS PRN
Qty: 50 TABLET | Refills: 0 | Status: SHIPPED | OUTPATIENT
Start: 2019-10-09 | End: 2019-11-11 | Stop reason: SDUPTHER

## 2019-10-09 RX ORDER — OXYCODONE HYDROCHLORIDE AND ACETAMINOPHEN 5; 325 MG/1; MG/1
1 TABLET ORAL EVERY 4 HOURS PRN
Qty: 50 TABLET | Refills: 0 | Status: CANCELLED | OUTPATIENT
Start: 2019-10-09

## 2019-10-09 NOTE — TELEPHONE ENCOUNTER
09/10/2019  2  09/10/2019  OXYCODONE-ACETAMINOPHEN 5-325  50 0  8  BR DARIEL  [phone]  [facility] ([id number])  0  [phone] 88 MME  Other

## 2019-11-04 DIAGNOSIS — M25.50 ARTHRALGIA, UNSPECIFIED JOINT: ICD-10-CM

## 2019-11-04 RX ORDER — INDOMETHACIN 50 MG/1
50 CAPSULE ORAL
Qty: 30 CAPSULE | Refills: 3 | Status: SHIPPED | OUTPATIENT
Start: 2019-11-04 | End: 2020-06-02 | Stop reason: SDUPTHER

## 2019-11-11 DIAGNOSIS — G89.4 CHRONIC PAIN SYNDROME: ICD-10-CM

## 2019-11-11 RX ORDER — OXYCODONE HYDROCHLORIDE AND ACETAMINOPHEN 5; 325 MG/1; MG/1
1 TABLET ORAL EVERY 4 HOURS PRN
Qty: 50 TABLET | Refills: 0 | Status: SHIPPED | OUTPATIENT
Start: 2019-11-11 | End: 2019-12-13 | Stop reason: SDUPTHER

## 2019-11-11 NOTE — TELEPHONE ENCOUNTER
Site checked      10/09/2019  2  10/09/2019  OXYCODONE-ACETAMINOPHEN 5-325  50 0  8  AKANKSHA BENAVIDES

## 2019-12-13 DIAGNOSIS — G89.4 CHRONIC PAIN SYNDROME: ICD-10-CM

## 2019-12-13 RX ORDER — OXYCODONE HYDROCHLORIDE AND ACETAMINOPHEN 5; 325 MG/1; MG/1
1 TABLET ORAL EVERY 4 HOURS PRN
Qty: 50 TABLET | Refills: 0 | Status: SHIPPED | OUTPATIENT
Start: 2019-12-13 | End: 2019-12-16 | Stop reason: SDUPTHER

## 2019-12-16 ENCOUNTER — TELEPHONE (OUTPATIENT)
Dept: FAMILY MEDICINE CLINIC | Facility: CLINIC | Age: 59
End: 2019-12-16

## 2019-12-16 DIAGNOSIS — G89.4 CHRONIC PAIN SYNDROME: ICD-10-CM

## 2019-12-16 RX ORDER — OXYCODONE HYDROCHLORIDE AND ACETAMINOPHEN 5; 325 MG/1; MG/1
1 TABLET ORAL EVERY 4 HOURS PRN
Qty: 50 TABLET | Refills: 0 | Status: SHIPPED | OUTPATIENT
Start: 2019-12-16 | End: 2019-12-18 | Stop reason: SDUPTHER

## 2019-12-18 DIAGNOSIS — G89.4 CHRONIC PAIN SYNDROME: ICD-10-CM

## 2019-12-18 RX ORDER — OXYCODONE HYDROCHLORIDE AND ACETAMINOPHEN 5; 325 MG/1; MG/1
1 TABLET ORAL EVERY 4 HOURS PRN
Qty: 50 TABLET | Refills: 0 | Status: SHIPPED | OUTPATIENT
Start: 2019-12-18 | End: 2020-01-14 | Stop reason: SDUPTHER

## 2019-12-18 NOTE — TELEPHONE ENCOUNTER
Pharmacy states they never received this prescription , could you resend for BD's pt because he is off today - TY  Oxycodone   [employer]   (L/m for pts wife  On home #)

## 2020-01-14 DIAGNOSIS — G89.4 CHRONIC PAIN SYNDROME: ICD-10-CM

## 2020-01-14 RX ORDER — OXYCODONE HYDROCHLORIDE AND ACETAMINOPHEN 5; 325 MG/1; MG/1
1 TABLET ORAL EVERY 4 HOURS PRN
Qty: 50 TABLET | Refills: 0 | Status: SHIPPED | OUTPATIENT
Start: 2020-01-14 | End: 2020-02-03 | Stop reason: SDUPTHER

## 2020-02-03 ENCOUNTER — TELEPHONE (OUTPATIENT)
Dept: FAMILY MEDICINE CLINIC | Facility: CLINIC | Age: 60
End: 2020-02-03

## 2020-02-03 DIAGNOSIS — G89.4 CHRONIC PAIN SYNDROME: ICD-10-CM

## 2020-02-03 RX ORDER — OXYCODONE HYDROCHLORIDE AND ACETAMINOPHEN 5; 325 MG/1; MG/1
1 TABLET ORAL EVERY 4 HOURS PRN
Qty: 50 TABLET | Refills: 0 | Status: SHIPPED | OUTPATIENT
Start: 2020-02-03 | End: 2020-03-11 | Stop reason: SDUPTHER

## 2020-02-18 ENCOUNTER — OFFICE VISIT (OUTPATIENT)
Dept: FAMILY MEDICINE CLINIC | Facility: CLINIC | Age: 60
End: 2020-02-18
Payer: COMMERCIAL

## 2020-02-18 VITALS
SYSTOLIC BLOOD PRESSURE: 132 MMHG | WEIGHT: 274 LBS | HEIGHT: 70 IN | OXYGEN SATURATION: 99 % | DIASTOLIC BLOOD PRESSURE: 90 MMHG | HEART RATE: 95 BPM | TEMPERATURE: 98.5 F | RESPIRATION RATE: 16 BRPM | BODY MASS INDEX: 39.22 KG/M2

## 2020-02-18 DIAGNOSIS — M77.8 TENDINITIS OF RIGHT SHOULDER: Primary | ICD-10-CM

## 2020-02-18 PROCEDURE — 99213 OFFICE O/P EST LOW 20 MIN: CPT | Performed by: FAMILY MEDICINE

## 2020-02-18 PROCEDURE — 20610 DRAIN/INJ JOINT/BURSA W/O US: CPT | Performed by: FAMILY MEDICINE

## 2020-02-18 PROCEDURE — 3008F BODY MASS INDEX DOCD: CPT | Performed by: FAMILY MEDICINE

## 2020-02-18 RX ORDER — METHYLPREDNISOLONE ACETATE 40 MG/ML
1 INJECTION, SUSPENSION INTRA-ARTICULAR; INTRALESIONAL; INTRAMUSCULAR; SOFT TISSUE
Status: COMPLETED | OUTPATIENT
Start: 2020-02-18 | End: 2020-02-18

## 2020-02-18 RX ADMIN — METHYLPREDNISOLONE ACETATE 1 ML: 40 INJECTION, SUSPENSION INTRA-ARTICULAR; INTRALESIONAL; INTRAMUSCULAR; SOFT TISSUE at 15:40

## 2020-02-18 NOTE — PATIENT INSTRUCTIONS
Steroid Joint Injection   AMBULATORY CARE:   What you need to know about steroid joint injection:  A steroid joint injection is a procedure to inject steroid medicine into a joint  Steroid medicine decreases pain and inflammation  The injection may also contain an anesthetic (numbing medicine) to decrease pain  It may be done to treat conditions such as arthritis, gout, or carpal tunnel syndrome  The injections may be given in your knee, ankle, shoulder, elbow, wrist, or ankle  How to prepare for steroid joint injection:  Your healthcare provider will talk to you about how to prepare for this procedure  He will tell you what medicines to take or not take on the day of your procedure  You may need to stop taking blood thinners several days before your procedure  What will happen during steroid joint injection:  You may be given local anesthesia to numb the area where the injection will be given  With local anesthesia, you may still feel pressure during the procedure, but you should not feel any pain  Your healthcare provider may use ultrasound or fluoroscopy (a type of x-ray) to guide the needle to the right area  He will then inject the steroid into your joint  A bandage will be placed on the injection site  What will happen after steroid joint injection:  You may have redness, warmth, or sweating in your face and chest right after the steroid injection  Steroids can affect blood sugar levels  If you have diabetes, you should check your blood sugars closely in the first 24 hours after your procedure  Risks of steroid joint injection:  You may get an infection in your joint  The injection may also cause more pain during the first 24 to 36 hours  You may need more than one injection to feel pain relief  The skin near the injection site may be damaged and become discolored or indented  This can happen if the steroid is placed too close to your skin   A tendon near the injection site may rupture or a nerve can be damaged  Contact your healthcare provider if:   · You have fever or chills  · You have redness or swelling in the injection site  · You have more pain than usual in your joint for more than 72 hours  · You have questions or concerns about your condition or care  Medicines:   · Pain medicine  may be given  Ask how to take this medicine safely  · Take your medicine as directed  Contact your healthcare provider if you think your medicine is not helping or if you have side effects  Tell him or her if you are allergic to any medicine  Keep a list of the medicines, vitamins, and herbs you take  Include the amounts, and when and why you take them  Bring the list or the pill bottles to follow-up visits  Carry your medicine list with you in case of an emergency  Self-care:   · Leave the bandage on for 8 to 12 hours  Care for your wound as directed  · Rest the area  as directed  You may need to decrease weight on certain joints, such as the knee, for a period of time  Ask when you can return to your daily activities  · Elevate  your limb where the steroid injection was given  Elevate the limb above the level of your heart as often as you can  This will help decrease swelling and pain  Prop your limb on pillows or blankets to keep it elevated comfortably  · Apply ice  on your joint for 15 to 20 minutes every hour or as directed  Use an ice pack, or put crushed ice in a plastic bag  Cover it with a towel  Ice helps prevent tissue damage and decreases swelling and pain  Follow up with your healthcare provider as directed:  Write down your questions so you remember to ask them during your visits  © 2017 [employer] Information is for End User's use only and may not be sold, redistributed or otherwise used for commercial purposes  All illustrations and images included in CareNotes® are the copyrighted property of [employer]  or Timi Guevara    The above information is an  only  It is not intended as medical advice for individual conditions or treatments  Talk to your doctor, nurse or pharmacist before following any medical regimen to see if it is safe and effective for you

## 2020-02-18 NOTE — PROGRESS NOTES
Assessment/Plan:       Diagnoses and all orders for this visit:    Tendinitis of right shoulder    Other orders  -     Large joint arthrocentesis        No problem-specific Assessment & Plan notes found for this encounter  BMI Counseling: Body mass index is 39 31 kg/m²  The BMI is above normal  Nutrition recommendations include decreasing portion sizes and moderation in carbohydrate intake  Exercise recommendations include exercising 3-5 times per week  Pharmacotherapy was ordered to help aid in weight loss  Subjective:      Patient ID: Dorota Casarez is a 64 y o  male  Patient comes in with sore right shoulder  He wishes to have cortisone injection  The following portions of the patient's history were reviewed and updated as appropriate:   He has a past medical history of Gout ,  does not have any pertinent problems on file  ,   has a past surgical history that includes Cataract extraction; Knee surgery; and Rotator cuff repair  ,  family history includes Esophageal cancer in his father; Lung cancer in his father; Pancreatic cancer in his father; Stroke in his family; Thyroid nodules in his father  ,   reports that he has been smoking cigars  He has never used smokeless tobacco  He reports that he drinks alcohol  He reports that he does not use drugs  ,  has No Known Allergies     Current Outpatient Medications   Medication Sig Dispense Refill    allopurinol (ZYLOPRIM) 300 mg tablet Take 1 tablet (300 mg total) by mouth daily 90 tablet 3    colchicine (COLCRYS) 0 6 mg tablet TAKE 1 TABLET TWICE A DAY AND EVERY 3 HOURS AS NEEDED FOR GOUT AS DIRECTED 200 tablet 5    indomethacin (INDOCIN) 50 mg capsule Take 1 capsule (50 mg total) by mouth 3 (three) times a day with meals 30 capsule 3    levocetirizine (XYZAL) 5 MG tablet       loratadine (CLARITIN) 10 mg tablet Take 1 tablet (10 mg total) by mouth daily 90 tablet 3    naproxen (NAPROSYN) 500 mg tablet Take 1 tablet (500 mg total) by mouth 2 (two) times a day with meals 180 tablet 3    omeprazole (PriLOSEC) 40 MG capsule TAKE 1 CAPSULE DAILY 90 capsule 5    oxyCODONE-acetaminophen (PERCOCET) 5-325 mg per tablet Take 1 tablet by mouth every 4 (four) hours as needed for moderate painMax Daily Amount: 6 tablets 50 tablet 0    rOPINIRole (REQUIP) 1 mg tablet Take 1 tablet (1 mg total) by mouth daily at bedtime 90 tablet 1    Saw Palmetto 160 MG CAPS 1 bid 60 capsule 0     Current Facility-Administered Medications   Medication Dose Route Frequency Provider Last Rate Last Dose    cyanocobalamin injection 1,000 mcg  1,000 mcg Intramuscular Q30 Days Shira Méndez MD   1,000 mcg at 05/11/18 1643       Review of Systems   Constitutional: Negative  Respiratory: Negative  Cardiovascular: Negative  Musculoskeletal: Positive for arthralgias and back pain  Objective:  Vitals:    02/18/20 1509   BP: 132/90   Pulse: 95   Resp: 16   Temp: 98 5 °F (36 9 °C)   SpO2: 99%   Weight: 124 kg (274 lb)   Height: 5' 10" (1 778 m)     Body mass index is 39 31 kg/m²         Physical Exam   Musculoskeletal:   Painful abduction and flexion of right shoulder     Large joint arthrocentesis: L glenohumeral  Date/Time: 2/18/2020 3:40 PM  Consent given by: patient  Site marked: site marked  Timeout: Immediately prior to procedure a time out was called to verify the correct patient, procedure, equipment, support staff and site/side marked as required   Supporting Documentation  Indications: pain   Procedure Details  Location: shoulder - L glenohumeral  Preparation: Patient was prepped and draped in the usual sterile fashion  Needle size: 25 G  Ultrasound guidance: no  Approach: posterior  Medications administered: 1 mL methylPREDNISolone acetate 40 mg/mL    Patient tolerance: patient tolerated the procedure well with no immediate complications  Dressing:  Sterile dressing applied

## 2020-02-19 ENCOUNTER — TELEPHONE (OUTPATIENT)
Dept: ADMINISTRATIVE | Facility: OTHER | Age: 60
End: 2020-02-19

## 2020-02-19 NOTE — LETTER
Procedure Request Form: Colonoscopy      Date Requested: 20  Patient: Washtucna Grit  Patient : 1960   Referring Provider: Wily Frost, MD        Date of Procedure ______________________________       The above patient has informed us that they have completed their   most recent Colonoscopy at your facility  Please complete   this form and attach all corresponding procedure reports/results  Comments __________________________________________________________  ____________________________________________________________________  ____________________________________________________________________  ____________________________________________________________________    Facility Completing Procedure _________________________________________    Form Completed By (print name) _______________________________________      Signature __________________________________________________________      These reports are needed for  compliance    Please fax this completed form and a copy of the procedure report to our office located at [place] [address] as soon as possible to [phone] attention Ed: Phone [phone]    We thank you for your assistance in treating our mutual patient

## 2020-02-19 NOTE — TELEPHONE ENCOUNTER
----- Message from Shawn Walsh, [address] sent at 2/18/2020  3:20 PM EST -----  Regarding: Colonoscopy  02/18/20 3:22 PM    Hello, our patient Dafne Conti has had CRC: Colonoscopy completed/performed  Please assist in updating the patient chart by making an External outreach to Dr Anthony Casper facility      Thank you,  Shawn Walsh MA   [facility] [address]

## 2020-02-19 NOTE — LETTER
Procedure Request Form: Colonoscopy      Date Requested: 20  Patient: Domingo Berrios  Patient : 1960   Referring Provider: Tika Peña MD        Date of Procedure __________last performed_________       The above patient has informed us that they have completed their   most recent Colonoscopy at your facility  Please complete   this form and attach all corresponding procedure reports/results  Comments __________________________________________________________  ____________________________________________________________________  ____________________________________________________________________  ____________________________________________________________________    Facility Completing Procedure _________________________________________    Form Completed By (print name) _______________________________________      Signature __________________________________________________________      These reports are needed for  compliance    Please fax this completed form and a copy of the procedure report to our office located at [place] [address] as soon as possible to [phone] erik Ward: Phone [phone]    We thank you for your assistance in treating our mutual patient

## 2020-02-19 NOTE — TELEPHONE ENCOUNTER
Upon review of the In Basket request and the patient's chart, initial outreach has been made via fax, please see Contacts section for details  A second outreach attempt will be made within 3 business days      Thank you  Ingrid Finney

## 2020-02-24 NOTE — TELEPHONE ENCOUNTER
As a follow-up, a second attempt has been made for outreach via fax, please see Contacts section for details  A third and final attempt will be made within 3 business days      Thank you  Rilla Cushing

## 2020-02-28 NOTE — TELEPHONE ENCOUNTER
Upon review of your request/inquiry, we have found as a result of outreach that patient did not have the requested item(s) completed  Otelia Sandhoff at Dr Shobha Feng office does not have access to records prior to what is in [address] [place] [address]  Any additional questions or concerns should be emailed to the Practice Liaisons via [email or web address] email, please do not reply via In Basket       Thank you  Amador Grover

## 2020-02-28 NOTE — TELEPHONE ENCOUNTER
As a final attempt, a third outreach has been made via telephone call  Please see Contacts section for details  This encounter will be closed and completed by end of day  Should we receive the requested information because of previous outreach attempts, the requested patient's chart will be updated appropriately       Thank you  Gayle Hylton

## 2020-03-11 DIAGNOSIS — G89.4 CHRONIC PAIN SYNDROME: ICD-10-CM

## 2020-03-11 RX ORDER — OXYCODONE HYDROCHLORIDE AND ACETAMINOPHEN 5; 325 MG/1; MG/1
1 TABLET ORAL EVERY 4 HOURS PRN
Qty: 50 TABLET | Refills: 0 | Status: SHIPPED | OUTPATIENT
Start: 2020-03-11 | End: 2020-04-15 | Stop reason: SDUPTHER

## 2020-03-11 NOTE — TELEPHONE ENCOUNTER
Medication:  PDMP   02/03/2020  1  02/03/2020  OXYCODONE-ACETAMINOPHEN 5-325  30 0  5  BR DARIEL  [id number]  BARBARA ([id number])  0  45  0 MME  Other  [place]    02/03/2020  1  02/03/2020  OXYCODONE-ACETAMINOPHEN 5-325  20 0  3  BR DARIEL  [id number]  BARBARA ([id number])  0  50 0 MME  Private Pay  PA    01/15/2020  1  01/14/2020  OXYCODONE-ACETAMINOPHEN 5-325  50 0  8  BR DARIEL  [id number]  BARBARA ([id number])  0  46 88 MME  Other  PA    12/18/2019  1  12/18/2019  OXYCODONE-ACETAMINOPHEN 5-325  50 0  8  AKANKSHA MAKAYLA  [id number]            Active agreement on file [facility]

## 2020-04-01 DIAGNOSIS — J30.9 ALLERGIC RHINITIS, UNSPECIFIED SEASONALITY, UNSPECIFIED TRIGGER: ICD-10-CM

## 2020-04-02 RX ORDER — LORATADINE 10 MG/1
TABLET ORAL
Qty: 90 TABLET | Refills: 3 | Status: SHIPPED | OUTPATIENT
Start: 2020-04-02 | End: 2020-10-23 | Stop reason: ALTCHOICE

## 2020-04-15 DIAGNOSIS — G89.4 CHRONIC PAIN SYNDROME: ICD-10-CM

## 2020-04-15 RX ORDER — OXYCODONE HYDROCHLORIDE AND ACETAMINOPHEN 5; 325 MG/1; MG/1
1 TABLET ORAL EVERY 4 HOURS PRN
Qty: 50 TABLET | Refills: 0 | Status: SHIPPED | OUTPATIENT
Start: 2020-04-15 | End: 2020-05-11 | Stop reason: SDUPTHER

## 2020-05-11 DIAGNOSIS — G89.4 CHRONIC PAIN SYNDROME: ICD-10-CM

## 2020-05-11 RX ORDER — OXYCODONE HYDROCHLORIDE AND ACETAMINOPHEN 5; 325 MG/1; MG/1
1 TABLET ORAL EVERY 4 HOURS PRN
Qty: 50 TABLET | Refills: 0 | Status: SHIPPED | OUTPATIENT
Start: 2020-05-11 | End: 2020-06-02 | Stop reason: SDUPTHER

## 2020-06-02 DIAGNOSIS — G89.4 CHRONIC PAIN SYNDROME: ICD-10-CM

## 2020-06-02 DIAGNOSIS — M25.50 ARTHRALGIA, UNSPECIFIED JOINT: ICD-10-CM

## 2020-06-02 RX ORDER — INDOMETHACIN 50 MG/1
50 CAPSULE ORAL
Qty: 30 CAPSULE | Refills: 3 | Status: SHIPPED | OUTPATIENT
Start: 2020-06-02 | End: 2020-08-28 | Stop reason: SDUPTHER

## 2020-06-02 RX ORDER — OXYCODONE HYDROCHLORIDE AND ACETAMINOPHEN 5; 325 MG/1; MG/1
1 TABLET ORAL EVERY 4 HOURS PRN
Qty: 50 TABLET | Refills: 0 | Status: SHIPPED | OUTPATIENT
Start: 2020-06-02 | End: 2020-07-02 | Stop reason: SDUPTHER

## 2020-07-02 DIAGNOSIS — G89.4 CHRONIC PAIN SYNDROME: ICD-10-CM

## 2020-07-02 RX ORDER — OXYCODONE HYDROCHLORIDE AND ACETAMINOPHEN 5; 325 MG/1; MG/1
1 TABLET ORAL EVERY 4 HOURS PRN
Qty: 50 TABLET | Refills: 0 | Status: SHIPPED | OUTPATIENT
Start: 2020-07-02 | End: 2020-07-30 | Stop reason: SDUPTHER

## 2020-07-02 NOTE — TELEPHONE ENCOUNTER
Medication:  PDMP reviewed  Active agreement on file -No     Patient had office visit in Feb for steroid injection  No request to return to office for visit  Please advise        06/02/2020  1  06/02/2020  OXYCODONE-ACETAMINOPHEN 5-325  50 0  8  BR DARIEL  [id number]  BARBARA ([id number])  0  46 88 MME  Other  PA    05/11/2020  1  05/11/2020  OXYCODONE-ACETAMINOPHEN 5-325  50 0  8  BR DARIEL  [id number]  BARBARA ([id number])  0  46 88 MME  Other  PA    04/15/2020  1  04/15/2020  OXYCODONE-ACETAMINOPHEN 5-325  50 0  8  BR DARIEL  [id number]  BARBARA ([id number])  0  46 88 MME  Other  PA    03/11/2020  1  03/11/2020  OXYCODONE-ACETAMINOPHEN 5-325  50 0  8  PA CRU  [id number]  BARBARA ([id number])  0  46 88 MME  Other  PA

## 2020-07-10 ENCOUNTER — OFFICE VISIT (OUTPATIENT)
Dept: FAMILY MEDICINE CLINIC | Facility: CLINIC | Age: 60
End: 2020-07-10
Payer: COMMERCIAL

## 2020-07-10 VITALS
RESPIRATION RATE: 16 BRPM | DIASTOLIC BLOOD PRESSURE: 90 MMHG | WEIGHT: 266.2 LBS | HEIGHT: 70 IN | BODY MASS INDEX: 38.11 KG/M2 | HEART RATE: 80 BPM | TEMPERATURE: 98.1 F | SYSTOLIC BLOOD PRESSURE: 135 MMHG | OXYGEN SATURATION: 95 %

## 2020-07-10 DIAGNOSIS — M77.8 TENDINITIS OF RIGHT SHOULDER: Primary | ICD-10-CM

## 2020-07-10 DIAGNOSIS — J30.9 ALLERGIC RHINITIS, UNSPECIFIED SEASONALITY, UNSPECIFIED TRIGGER: ICD-10-CM

## 2020-07-10 DIAGNOSIS — K21.9 GASTROESOPHAGEAL REFLUX DISEASE, ESOPHAGITIS PRESENCE NOT SPECIFIED: ICD-10-CM

## 2020-07-10 DIAGNOSIS — M10.9 GOUT, UNSPECIFIED CAUSE, UNSPECIFIED CHRONICITY, UNSPECIFIED SITE: ICD-10-CM

## 2020-07-10 DIAGNOSIS — E78.5 DYSLIPIDEMIA: ICD-10-CM

## 2020-07-10 DIAGNOSIS — Z72.0 TOBACCO USE: ICD-10-CM

## 2020-07-10 PROCEDURE — 3008F BODY MASS INDEX DOCD: CPT | Performed by: FAMILY MEDICINE

## 2020-07-10 PROCEDURE — 4004F PT TOBACCO SCREEN RCVD TLK: CPT | Performed by: FAMILY MEDICINE

## 2020-07-10 PROCEDURE — 20610 DRAIN/INJ JOINT/BURSA W/O US: CPT | Performed by: FAMILY MEDICINE

## 2020-07-10 PROCEDURE — 99214 OFFICE O/P EST MOD 30 MIN: CPT | Performed by: FAMILY MEDICINE

## 2020-07-10 RX ORDER — METHYLPREDNISOLONE ACETATE 40 MG/ML
1 INJECTION, SUSPENSION INTRA-ARTICULAR; INTRALESIONAL; INTRAMUSCULAR; SOFT TISSUE
Status: COMPLETED | OUTPATIENT
Start: 2020-07-10 | End: 2020-07-10

## 2020-07-10 RX ADMIN — METHYLPREDNISOLONE ACETATE 1 ML: 40 INJECTION, SUSPENSION INTRA-ARTICULAR; INTRALESIONAL; INTRAMUSCULAR; SOFT TISSUE at 16:58

## 2020-07-24 DIAGNOSIS — K21.9 GASTROESOPHAGEAL REFLUX DISEASE, ESOPHAGITIS PRESENCE NOT SPECIFIED: ICD-10-CM

## 2020-07-24 RX ORDER — OMEPRAZOLE 40 MG/1
CAPSULE, DELAYED RELEASE ORAL
Qty: 90 CAPSULE | Refills: 3 | Status: SHIPPED | OUTPATIENT
Start: 2020-07-24 | End: 2021-07-19

## 2020-07-30 DIAGNOSIS — G89.4 CHRONIC PAIN SYNDROME: ICD-10-CM

## 2020-07-30 RX ORDER — OXYCODONE HYDROCHLORIDE AND ACETAMINOPHEN 5; 325 MG/1; MG/1
1 TABLET ORAL EVERY 4 HOURS PRN
Qty: 50 TABLET | Refills: 0 | Status: SHIPPED | OUTPATIENT
Start: 2020-07-30 | End: 2020-08-28 | Stop reason: SDUPTHER

## 2020-07-30 NOTE — TELEPHONE ENCOUNTER
Medication:  PDMP   07/02/2020  1  07/02/2020  OXYCODONE-ACETAMINOPHEN 5-325  50 0  8  BR DARIEL  [id number]  BARBARA ([id number])  0  46 88 MME  Other  PA    06/02/2020  1  06/02/2020  OXYCODONE-ACETAMINOPHEN 5-325  50 0  8  BR DARIEL  [id number]  BARBARA ([id number])  0  46 88 MME  Other  PA    05/11/2020  1  05/11/2020  OXYCODONE-ACETAMINOPHEN 5-325  50 0  8  BR DARIEL  [id number]  BARBARA ([id number])  0  46 88 MME  Other  PA    04/15/2020  1  04/15/2020  OXYCODONE-ACETAMINOPHEN 5-325  50 0  8  BR DARIEL  [id number]  BARBARA ([id number])  0  46 88 MME  Other  PA    03/11/2020  1  03/11/2020  OXYCODONE-ACETAMINOPHEN 5-325  50 0  8  PA CRU  [id number]  BARBARA ([id number])  0  46 88 MME          Active agreement on file -Yes

## 2020-08-07 DIAGNOSIS — G25.81 RLS (RESTLESS LEGS SYNDROME): ICD-10-CM

## 2020-08-07 DIAGNOSIS — M54.50 LOW BACK PAIN: ICD-10-CM

## 2020-08-07 DIAGNOSIS — M10.9 GOUT, UNSPECIFIED CAUSE, UNSPECIFIED CHRONICITY, UNSPECIFIED SITE: ICD-10-CM

## 2020-08-07 RX ORDER — NAPROXEN 500 MG/1
TABLET ORAL
Qty: 180 TABLET | Refills: 3 | Status: SHIPPED | OUTPATIENT
Start: 2020-08-07 | End: 2020-10-23 | Stop reason: ALTCHOICE

## 2020-08-07 RX ORDER — ROPINIROLE 1 MG/1
TABLET, FILM COATED ORAL
Qty: 90 TABLET | Refills: 3 | Status: SHIPPED | OUTPATIENT
Start: 2020-08-07 | End: 2021-11-04

## 2020-08-07 RX ORDER — COLCHICINE 0.6 MG/1
TABLET ORAL
Qty: 200 TABLET | Refills: 3 | Status: SHIPPED | OUTPATIENT
Start: 2020-08-07 | End: 2021-09-13

## 2020-08-28 DIAGNOSIS — M25.50 ARTHRALGIA, UNSPECIFIED JOINT: ICD-10-CM

## 2020-08-28 DIAGNOSIS — G89.4 CHRONIC PAIN SYNDROME: ICD-10-CM

## 2020-08-28 RX ORDER — OXYCODONE HYDROCHLORIDE AND ACETAMINOPHEN 5; 325 MG/1; MG/1
1 TABLET ORAL EVERY 4 HOURS PRN
Qty: 50 TABLET | Refills: 0 | Status: SHIPPED | OUTPATIENT
Start: 2020-08-28 | End: 2020-12-23 | Stop reason: SDUPTHER

## 2020-08-28 RX ORDER — INDOMETHACIN 50 MG/1
50 CAPSULE ORAL
Qty: 30 CAPSULE | Refills: 3 | Status: SHIPPED | OUTPATIENT
Start: 2020-08-28 | End: 2020-12-23 | Stop reason: SDUPTHER

## 2020-08-28 NOTE — TELEPHONE ENCOUNTER
Medication:  PDMP   07/30/2020  1  07/30/2020  OXYCODONE-ACETAMINOPHEN 5-325  50 0  8  BR DARIEL  [id number]  BARBARA ([id number])  0  46 88 MME  Other  PA    07/02/2020  1  07/02/2020  OXYCODONE-ACETAMINOPHEN 5-325  50 0  8  BR DARIEL  [id number]  BARBARA ([id number])  0  46 88 MME  Other  PA    06/02/2020  1  06/02/2020  OXYCODONE-ACETAMINOPHEN 5-325  50 0  8  BR DARIEL  [id number]  BARBARA ([id number])  0  46 88 MME  Other  PA        Active agreement on file -Yes

## 2020-09-06 ENCOUNTER — HOSPITAL ENCOUNTER (EMERGENCY)
Facility: HOSPITAL | Age: 60
Discharge: HOME | End: 2020-09-06
Attending: EMERGENCY MEDICINE
Payer: COMMERCIAL

## 2020-09-06 ENCOUNTER — APPOINTMENT (EMERGENCY)
Dept: ULTRASOUND IMAGING | Facility: HOSPITAL | Age: 60
End: 2020-09-06
Payer: COMMERCIAL

## 2020-09-06 VITALS
DIASTOLIC BLOOD PRESSURE: 93 MMHG | HEART RATE: 74 BPM | BODY MASS INDEX: 38.43 KG/M2 | RESPIRATION RATE: 16 BRPM | SYSTOLIC BLOOD PRESSURE: 131 MMHG | TEMPERATURE: 97.5 F | OXYGEN SATURATION: 95 % | WEIGHT: 267.86 LBS

## 2020-09-06 DIAGNOSIS — M54.16 LUMBAR RADICULOPATHY: Primary | ICD-10-CM

## 2020-09-06 PROCEDURE — 99284 EMERGENCY DEPT VISIT MOD MDM: CPT

## 2020-09-06 PROCEDURE — 93971 EXTREMITY STUDY: CPT

## 2020-09-06 PROCEDURE — 99283 EMERGENCY DEPT VISIT LOW MDM: CPT | Performed by: EMERGENCY MEDICINE

## 2020-09-06 RX ORDER — PREDNISONE 20 MG/1
40 TABLET ORAL DAILY
Qty: 10 TABLET | Refills: 0 | Status: SHIPPED | OUTPATIENT
Start: 2020-09-06 | End: 2020-09-11

## 2020-09-06 RX ORDER — PREDNISONE 20 MG/1
60 TABLET ORAL ONCE
Status: COMPLETED | OUTPATIENT
Start: 2020-09-06 | End: 2020-09-06

## 2020-09-06 RX ORDER — OXYCODONE HYDROCHLORIDE AND ACETAMINOPHEN 5; 325 MG/1; MG/1
2 TABLET ORAL ONCE
Status: COMPLETED | OUTPATIENT
Start: 2020-09-06 | End: 2020-09-06

## 2020-09-06 RX ORDER — OXYCODONE HYDROCHLORIDE AND ACETAMINOPHEN 5; 325 MG/1; MG/1
2 TABLET ORAL EVERY 6 HOURS PRN
Qty: 30 TABLET | Refills: 0 | Status: SHIPPED | OUTPATIENT
Start: 2020-09-06 | End: 2020-10-23 | Stop reason: ALTCHOICE

## 2020-09-06 RX ADMIN — PREDNISONE 60 MG: 20 TABLET ORAL at 13:37

## 2020-09-06 RX ADMIN — OXYCODONE HYDROCHLORIDE AND ACETAMINOPHEN 2 TABLET: 5; 325 TABLET ORAL at 13:38

## 2020-09-06 NOTE — DISCHARGE INSTRUCTIONS
Rest  Heat  Percocet 1 or 2 every 6 hours if needed for pain, caution  narcotic will make a sleepy  Prednisone daily for the next 5 days to reduce inflammation  Follow up with your family provider

## 2020-09-06 NOTE — ED PROVIDER NOTES
History  Chief Complaint   Patient presents with    Leg Pain     pt c/o left calf pain that started approx a week ago that has progressively gotten worse  area is not red/not warm to touch  pt states to have driven to [facility] for approx 4 hours last weekend     HPI patient is a 24-year-old male, presents with left calf pain that has developed over the last week or so  Patient denies any injury to the area  He reports a stinging somewhat burning pain and points to the lateral aspect of his left calf  Pain seems to be worse with movement  Patient reports he could not sleep last night other than with his leg markedly elevated above his body  Denies any weakness or numbness  Denies any difficulty walking  Accompanying family member is concerned the patient has a deep vein thrombosis  Patient denies any fever or chills  Denies any previous history of DVT  Reports previous history of gout and right shoulder pain  On chronic analgesics for right shoulder pain but reports not needing to take them recently  Past medical history of gout, right shoulder pain, rotator cuff injury  Family history noncontributory  Social history, smoker, denies drug abuse    Prior to Admission Medications   Prescriptions Last Dose Informant Patient Reported? Taking?    Saw Palmetto 160 MG CAPS Not Taking at Unknown time  No No   Si bid   Patient not taking: Reported on 2020   allopurinol (ZYLOPRIM) 300 mg tablet Not Taking at Unknown time  No No   Sig: Take 1 tablet (300 mg total) by mouth daily   Patient not taking: Reported on 2020   colchicine (COLCRYS) 0 6 mg tablet   No Yes   Sig: TAKE 1 TABLET TWICE A DAY AND EVERY 3 HOURS AS NEEDED FOR GOUT AS DIRECTED   indomethacin (INDOCIN) 50 mg capsule   No Yes   Sig: Take 1 capsule (50 mg total) by mouth 3 (three) times a day with meals   levocetirizine (XYZAL) 5 MG tablet   Yes Yes   loratadine (CLARITIN) 10 mg tablet   No Yes   Sig: TAKE 1 TABLET DAILY   naproxen (NAPROSYN) 500 mg tablet   No Yes   Sig: TAKE 1 TABLET TWICE A DAY WITH MEALS   omeprazole (PriLOSEC) 40 MG capsule   No Yes   Sig: TAKE 1 CAPSULE DAILY   oxyCODONE-acetaminophen (PERCOCET) 5-325 mg per tablet   No Yes   Sig: Take 1 tablet by mouth every 4 (four) hours as needed for moderate painMax Daily Amount: 6 tablets   rOPINIRole (REQUIP) 1 mg tablet   No Yes   Sig: TAKE 1 TABLET DAILY AT BEDTIME      Facility-Administered Medications Last Administration Doses Remaining   cyanocobalamin injection 1,000 mcg 5/11/2018  4:43 PM           Past Medical History:   Diagnosis Date    Gout        Past Surgical History:   Procedure Laterality Date    CATARACT EXTRACTION      KNEE SURGERY      ROTATOR CUFF REPAIR         Family History   Problem Relation Age of Onset    Esophageal cancer Father     Lung cancer Father     Pancreatic cancer Father     Thyroid nodules Father     Stroke Family      I have reviewed and agree with the history as documented  E-Cigarette/Vaping     E-Cigarette/Vaping Substances     Social History     Tobacco Use    Smoking status: Current Every Day Smoker     Types: Cigars    Smokeless tobacco: Never Used   Substance Use Topics    Alcohol use: Yes    Drug use: No       Review of Systems   Constitutional: Negative for fever  HENT: Negative for congestion  Eyes: Negative for pain and redness  Respiratory: Negative for cough and shortness of breath  Cardiovascular: Negative for chest pain  Gastrointestinal: Negative for abdominal pain and vomiting  Reports left lower leg pain, calf pain pain radiating down his leg  Physical Exam  Physical Exam  Vitals signs and nursing note reviewed  Constitutional:       Appearance: He is well-developed  HENT:      Head: Normocephalic  Right Ear: External ear normal       Left Ear: External ear normal       Nose: Nose normal    Eyes:      General: Lids are normal       Pupils: Pupils are equal, round, and reactive to light     Neck: Musculoskeletal: Normal range of motion and neck supple  Pulmonary:      Effort: Pulmonary effort is normal  No respiratory distress  Musculoskeletal: Normal range of motion  General: No deformity  Comments: There is minimal left-sided calf tenderness no redness no warmth good distal pulses and sensation good capillary refill  Pain is reproduced by lifting the patient's left leg, no contralateral straight leg raise, no weakness no numbness  Skin:     General: Skin is warm and dry  Neurological:      Mental Status: He is alert and oriented to person, place, and time  Vital Signs  ED Triage Vitals   Temperature Pulse Respirations Blood Pressure SpO2   09/06/20 1134 09/06/20 1134 09/06/20 1131 09/06/20 1131 09/06/20 1131   97 5 °F (36 4 °C) 79 17 154/93 95 %      Temp Source Heart Rate Source Patient Position - Orthostatic VS BP Location FiO2 (%)   09/06/20 1134 09/06/20 1131 09/06/20 1131 09/06/20 1131 --   Oral Monitor Sitting Right arm       Pain Score       09/06/20 1131       8           Vitals:    09/06/20 1131 09/06/20 1134 09/06/20 1230 09/06/20 1337   BP: 154/93   131/93   Pulse:  79 73 74   Patient Position - Orthostatic VS: Sitting   Lying         Visual Acuity      ED Medications  Medications   oxyCODONE-acetaminophen (PERCOCET) 5-325 mg per tablet 2 tablet (2 tablets Oral Given 9/6/20 1338)   predniSONE tablet 60 mg (60 mg Oral Given 9/6/20 1337)       Diagnostic Studies  Results Reviewed     None                 VAS lower limb venous duplex study, unilateral/limited    (Results Pending)              Procedures  Procedures         ED Course       US AUDIT      Most Recent Value   Initial Alcohol Screen: US AUDIT-C    1  How often do you have a drink containing alcohol?  0 Filed at: 09/06/2020 1140   2  How many drinks containing alcohol do you have on a typical day you are drinking? 0 Filed at: 09/06/2020 1140   3a  Male UNDER 65:  How often do you have five or more drinks on one occasion? 0 Filed at: 09/06/2020 1140   3b  FEMALE Any Age, or MALE 65+: How often do you have 4 or more drinks on one occassion? 0 Filed at: 09/06/2020 1140   Audit-C Score  0 Filed at: 09/06/2020 1140                  LUIS/DAST-10      Most Recent Value   How many times in the past year have you    Used an illegal drug or used a prescription medication for non-medical reasons? Never Filed at: 09/06/2020 1139         pain most consistent with lumbar radiculopathy, discussed with patient and family, concern for DVT, will ultrasound  ultrasound showed no acute clot  Discussed with patient, patient does not feel this is related to his back  I discussed with him that there can be nerve impingement which will make pain lower down in her leg from her back  Patient reports he believes this may be gout we talked about the fact that it is the lateral portion of his calf and not really near a joint  We discussed possibly could have gouty inflammation up higher that is irritating the nerve  We discussed prednisone would help for both a lumbar radiculopathy and also if this is a gouty flare  We discussed increasing his analgesics  MDM medical decision making 51-year-old male presents emergency department complaining of left calf pain family concerned about a DVT  Ultrasound was negative  Is worse with stretching of the left leg with straight leg raise most consistent with lumbar radiculopathy  Patient believes this may be gout although his pain is really in the left lateral aspect of his calf  Discussed use of prednisone to reduce pain in the lumbar radiculopathy or if it is a gouty flare  Discussed analgesics with Percocet  Discussed follow-up discussed indications to return        Disposition  Final diagnoses:   Lumbar radiculopathy     Time reflects when diagnosis was documented in both MDM as applicable and the Disposition within this note     Time User Action Codes Description Comment    9/6/2020  1:26 PM Moris Avila [F63 [id number]] Lumbar radiculopathy       ED Disposition     ED Disposition Condition Date/Time Comment    Discharge Stable Sun Sep 6, 2020  1:26 PM Sameer Cali discharge to home/self care  Follow-up Information     Follow up With Specialties Details Why Contact Info    Felix Jimenez MD Family Medicine   [address] [facility]  [phone]            Discharge Medication List as of 9/6/2020  1:28 PM      START taking these medications    Details   !! oxyCODONE-acetaminophen (PERCOCET) 5-325 mg per tablet Take 2 tablets by mouth every 6 (six) hours as needed for severe pain for up to 30 dosesMax Daily Amount: 8 tablets, Starting Sun 9/6/2020, Normal      predniSONE 20 mg tablet Take 2 tablets (40 mg total) by mouth daily for 5 days, Starting Sun 9/6/2020, Until Fri 9/11/2020, Normal       !! - Potential duplicate medications found  Please discuss with provider        CONTINUE these medications which have NOT CHANGED    Details   colchicine (COLCRYS) 0 6 mg tablet TAKE 1 TABLET TWICE A DAY AND EVERY 3 HOURS AS NEEDED FOR GOUT AS DIRECTED, Normal      indomethacin (INDOCIN) 50 mg capsule Take 1 capsule (50 mg total) by mouth 3 (three) times a day with meals, Starting Fri 8/28/2020, Normal      levocetirizine (XYZAL) 5 MG tablet Starting Tue 3/20/2018, Historical Med      loratadine (CLARITIN) 10 mg tablet TAKE 1 TABLET DAILY, Normal      naproxen (NAPROSYN) 500 mg tablet TAKE 1 TABLET TWICE A DAY WITH MEALS, Normal      omeprazole (PriLOSEC) 40 MG capsule TAKE 1 CAPSULE DAILY, Normal      !! oxyCODONE-acetaminophen (PERCOCET) 5-325 mg per tablet Take 1 tablet by mouth every 4 (four) hours as needed for moderate painMax Daily Amount: 6 tablets, Starting Fri 8/28/2020, Normal      rOPINIRole (REQUIP) 1 mg tablet TAKE 1 TABLET DAILY AT BEDTIME, Normal      allopurinol (ZYLOPRIM) 300 mg tablet Take 1 tablet (300 mg total) by mouth daily, Starting Thu 9/19/2019, Normal      Saw Palmetto 160 MG CAPS 1 bid, Print       !! - Potential duplicate medications found  Please discuss with provider  No discharge procedures on file      PDMP Review       Value Time User    PDMP Reviewed  Yes 3/11/2020 12:08 PM Carolin Campos PA-C          ED Provider  Electronically Signed by           Carlito Borja MD  09/06/20 1707

## 2020-09-08 ENCOUNTER — OFFICE VISIT (OUTPATIENT)
Dept: FAMILY MEDICINE CLINIC | Facility: CLINIC | Age: 60
End: 2020-09-08
Payer: COMMERCIAL

## 2020-09-08 ENCOUNTER — HOSPITAL ENCOUNTER (OUTPATIENT)
Dept: RADIOLOGY | Facility: HOSPITAL | Age: 60
Discharge: HOME | End: 2020-09-08
Attending: FAMILY MEDICINE
Payer: COMMERCIAL

## 2020-09-08 VITALS
BODY MASS INDEX: 38.08 KG/M2 | SYSTOLIC BLOOD PRESSURE: 136 MMHG | TEMPERATURE: 97.1 F | HEART RATE: 82 BPM | DIASTOLIC BLOOD PRESSURE: 84 MMHG | WEIGHT: 266 LBS | OXYGEN SATURATION: 95 % | HEIGHT: 70 IN

## 2020-09-08 DIAGNOSIS — M54.16 LUMBAR RADICULOPATHY: Primary | ICD-10-CM

## 2020-09-08 DIAGNOSIS — M54.16 LUMBAR RADICULOPATHY: ICD-10-CM

## 2020-09-08 PROCEDURE — 99213 OFFICE O/P EST LOW 20 MIN: CPT | Performed by: FAMILY MEDICINE

## 2020-09-08 PROCEDURE — 93971 EXTREMITY STUDY: CPT | Performed by: SURGERY

## 2020-09-08 PROCEDURE — 72100 X-RAY EXAM L-S SPINE 2/3 VWS: CPT

## 2020-09-08 RX ORDER — GABAPENTIN 300 MG/1
300 CAPSULE ORAL 3 TIMES DAILY
Qty: 90 CAPSULE | Refills: 3 | Status: SHIPPED | OUTPATIENT
Start: 2020-09-08 | End: 2022-01-13

## 2020-09-08 RX ORDER — HYDROMORPHONE HYDROCHLORIDE 4 MG/1
4 TABLET ORAL EVERY 4 HOURS PRN
Qty: 30 TABLET | Refills: 0 | Status: SHIPPED | OUTPATIENT
Start: 2020-09-08 | End: 2020-09-18

## 2020-09-08 NOTE — PROGRESS NOTES
Assessment/Plan:    Return visit in 1 week     Problem List Items Addressed This Visit        Nervous and Auditory    Lumbar radiculopathy - Primary     He will try chiropractic treatment  He is to remain off work for 1 week  Relevant Medications    gabapentin (NEURONTIN) 300 mg capsule    HYDROmorphone (DILAUDID) 4 mg tablet    Other Relevant Orders    XR spine lumbar 2 or 3 views injury            Subjective:      Patient ID: Margarita Guerrero is a 64 y o  male  Patient comes in with a one-week history of left hip pain that radiates down his left leg  He went to the emergency room 2 days ago  Ultrasound was negative for DVT  He was given prednisone 40 mg a day and increased Percocet 10 mg every 4 hours which is not giving him pain relief  The following portions of the patient's history were reviewed and updated as appropriate:   He has a past medical history of Gout ,  does not have any pertinent problems on file  ,   has a past surgical history that includes Cataract extraction; Knee surgery; and Rotator cuff repair  ,  family history includes Esophageal cancer in his father; Lung cancer in his father; Pancreatic cancer in his father; Stroke in his family; Thyroid nodules in his father  ,   reports that he has been smoking cigars  He has never used smokeless tobacco  He reports current alcohol use  He reports that he does not use drugs  ,  has No Known Allergies     Current Outpatient Medications   Medication Sig Dispense Refill    colchicine (COLCRYS) 0 6 mg tablet TAKE 1 TABLET TWICE A DAY AND EVERY 3 HOURS AS NEEDED FOR GOUT AS DIRECTED 200 tablet 3    indomethacin (INDOCIN) 50 mg capsule Take 1 capsule (50 mg total) by mouth 3 (three) times a day with meals 30 capsule 3    levocetirizine (XYZAL) 5 MG tablet       naproxen (NAPROSYN) 500 mg tablet TAKE 1 TABLET TWICE A DAY WITH MEALS 180 tablet 3    omeprazole (PriLOSEC) 40 MG capsule TAKE 1 CAPSULE DAILY 90 capsule 3    oxyCODONE-acetaminophen (PERCOCET) 5-325 mg per tablet Take 1 tablet by mouth every 4 (four) hours as needed for moderate painMax Daily Amount: 6 tablets 50 tablet 0    oxyCODONE-acetaminophen (PERCOCET) 5-325 mg per tablet Take 2 tablets by mouth every 6 (six) hours as needed for severe pain for up to 30 dosesMax Daily Amount: 8 tablets 30 tablet 0    predniSONE 20 mg tablet Take 2 tablets (40 mg total) by mouth daily for 5 days 10 tablet 0    rOPINIRole (REQUIP) 1 mg tablet TAKE 1 TABLET DAILY AT BEDTIME 90 tablet 3    allopurinol (ZYLOPRIM) 300 mg tablet Take 1 tablet (300 mg total) by mouth daily (Patient not taking: Reported on 9/6/2020) 90 tablet 3    gabapentin (NEURONTIN) 300 mg capsule Take 1 capsule (300 mg total) by mouth 3 (three) times a day 90 capsule 3    HYDROmorphone (DILAUDID) 4 mg tablet Take 1 tablet (4 mg total) by mouth every 4 (four) hours as needed (pain)Max Daily Amount: 24 mg 30 tablet 0    loratadine (CLARITIN) 10 mg tablet TAKE 1 TABLET DAILY (Patient not taking: Reported on 9/8/2020) 90 tablet 3    Saw Palmetto 160 MG CAPS 1 bid (Patient not taking: Reported on 9/6/2020) 60 capsule 0     Current Facility-Administered Medications   Medication Dose Route Frequency Provider Last Rate Last Dose    cyanocobalamin injection 1,000 mcg  1,000 mcg Intramuscular Q30 Days Harvey Amaya MD   1,000 mcg at 05/11/18 1643       Review of Systems   Constitutional: Negative  Respiratory: Negative  Cardiovascular: Negative  Objective:  Vitals:    09/08/20 0915   BP: 136/84   BP Location: Left arm   Patient Position: Sitting   Pulse: 82   Temp: (!) 97 1 °F (36 2 °C)   TempSrc: Tympanic   SpO2: 95%   Weight: 121 kg (266 lb)   Height: 5' 10" (1 778 m)     Body mass index is 38 17 kg/m²  Physical Exam  Constitutional:       Appearance: Normal appearance  HENT:      Head: Normocephalic and atraumatic     Pulmonary:      Effort: Pulmonary effort is normal    Musculoskeletal: Comments: Backs nontender  Straight leg raising is positive at 30°  Hip flexor strength is 2/5  Dorsiflexion and plantar flexion of left foot is 5/5  Neurological:      Mental Status: He is alert and oriented to person, place, and time     Psychiatric:         Mood and Affect: Mood normal          Behavior: Behavior normal

## 2020-09-15 ENCOUNTER — TELEMEDICINE (OUTPATIENT)
Dept: FAMILY MEDICINE CLINIC | Facility: CLINIC | Age: 60
End: 2020-09-15
Payer: COMMERCIAL

## 2020-09-15 VITALS — WEIGHT: 266 LBS | BODY MASS INDEX: 38.08 KG/M2 | HEIGHT: 70 IN

## 2020-09-15 DIAGNOSIS — M54.16 LUMBAR RADICULOPATHY: Primary | ICD-10-CM

## 2020-09-15 DIAGNOSIS — M51.36 DDD (DEGENERATIVE DISC DISEASE), LUMBAR: ICD-10-CM

## 2020-09-15 PROBLEM — M51.369 DDD (DEGENERATIVE DISC DISEASE), LUMBAR: Status: ACTIVE | Noted: 2020-09-15

## 2020-09-15 PROCEDURE — 99213 OFFICE O/P EST LOW 20 MIN: CPT | Performed by: FAMILY MEDICINE

## 2020-09-15 NOTE — PROGRESS NOTES
Virtual Regular Visit  He is to remain of work  Recommend f/u with spinal surgery    Assessment/Plan:    Problem List Items Addressed This Visit        Nervous and Auditory    Lumbar radiculopathy - Primary       Musculoskeletal and Integument    DDD (degenerative disc disease), lumbar               Reason for visit is   Chief Complaint   Patient presents with    Follow-up     1 WK    Virtual Regular Visit        Encounter provider Cristi Perkins MD    Provider located at [facility] [address]  [facility] [address]  [phone]      Recent Visits  Date Type Provider Dept   09/08/20 Office Visit MD [facility] [address] recent visits within past 7 days and meeting all other requirements     Today's Visits  Date Type Provider Dept   09/15/20 Telemedicine MD [facility] [address] [facility]   Showing today's visits and meeting all other requirements     Future Appointments  No visits were found meeting these conditions  Showing future appointments within next 150 days and meeting all other requirements        The patient was identified by name and date of birth  Chun Belle was informed that this is a telemedicine visit and that the visit is being conducted through [address] [place] [address] and patient was informed that this is not a secure, HIPAA-complaint platform  He agrees to proceed     My office door was closed  No one else was in the room  He acknowledged consent and understanding of privacy and security of the video platform  The patient has agreed to participate and understands they can discontinue the visit at any time  Patient is aware this is a billable service  Subjective  Chun Belle is a 64 y o  male he is seen for f/u   Is pain is unchanged after prednisone taper  He is getting no relief with gabapentin   He saw a chiropractor who ordered MRI that was done yesterday  Results are unknown  Zack HERRERA     Past Medical History:   Diagnosis Date    Gout        Past Surgical History:   Procedure Laterality Date    CATARACT EXTRACTION      KNEE SURGERY      ROTATOR CUFF REPAIR         Current Outpatient Medications   Medication Sig Dispense Refill    colchicine (COLCRYS) 0 6 mg tablet TAKE 1 TABLET TWICE A DAY AND EVERY 3 HOURS AS NEEDED FOR GOUT AS DIRECTED 200 tablet 3    gabapentin (NEURONTIN) 300 mg capsule Take 1 capsule (300 mg total) by mouth 3 (three) times a day 90 capsule 3    HYDROmorphone (DILAUDID) 4 mg tablet Take 1 tablet (4 mg total) by mouth every 4 (four) hours as needed (pain)Max Daily Amount: 24 mg 30 tablet 0    indomethacin (INDOCIN) 50 mg capsule Take 1 capsule (50 mg total) by mouth 3 (three) times a day with meals 30 capsule 3    levocetirizine (XYZAL) 5 MG tablet       naproxen (NAPROSYN) 500 mg tablet TAKE 1 TABLET TWICE A DAY WITH MEALS 180 tablet 3    omeprazole (PriLOSEC) 40 MG capsule TAKE 1 CAPSULE DAILY 90 capsule 3    oxyCODONE-acetaminophen (PERCOCET) 5-325 mg per tablet Take 1 tablet by mouth every 4 (four) hours as needed for moderate painMax Daily Amount: 6 tablets 50 tablet 0    oxyCODONE-acetaminophen (PERCOCET) 5-325 mg per tablet Take 2 tablets by mouth every 6 (six) hours as needed for severe pain for up to 30 dosesMax Daily Amount: 8 tablets 30 tablet 0    rOPINIRole (REQUIP) 1 mg tablet TAKE 1 TABLET DAILY AT BEDTIME 90 tablet 3    allopurinol (ZYLOPRIM) 300 mg tablet Take 1 tablet (300 mg total) by mouth daily (Patient not taking: Reported on 9/6/2020) 90 tablet 3    loratadine (CLARITIN) 10 mg tablet TAKE 1 TABLET DAILY (Patient not taking: Reported on 9/8/2020) 90 tablet 3    Saw Palmetto 160 MG CAPS 1 bid (Patient not taking: Reported on 9/6/2020) 60 capsule 0     Current Facility-Administered Medications   Medication Dose Route Frequency Provider Last Rate Last Dose    cyanocobalamin injection 1,000 mcg 1,000 mcg Intramuscular Q30 Days Claude Decree, MD   1,000 mcg at 05/11/18 1643        No Known Allergies    Review of Systems   Constitutional: Negative  Respiratory: Negative  Cardiovascular: Negative  Musculoskeletal: Positive for back pain  Video Exam    Vitals:    09/15/20 1528   Weight: 121 kg (266 lb)   Height: 5' 10" (1 778 m)       Physical Exam  Constitutional:       Appearance: He is obese  HENT:      Head: Normocephalic and atraumatic  Pulmonary:      Effort: Pulmonary effort is normal    Neurological:      Mental Status: He is alert and oriented to person, place, and time  Psychiatric:         Mood and Affect: Mood normal          Behavior: Behavior normal           I spent 15 minutes directly with the patient during this visit      VIRTUAL VISIT DISCLAIMER    Anabella Tyson acknowledges that he has consented to an online visit or consultation  He understands that the online visit is based solely on information provided by him, and that, in the absence of a face-to-face physical evaluation by the physician, the diagnosis he receives is both limited and provisional in terms of accuracy and completeness  This is not intended to replace a full medical face-to-face evaluation by the physician  Anabella Tyson understands and accepts these terms

## 2020-09-17 ENCOUNTER — TELEPHONE (OUTPATIENT)
Dept: FAMILY MEDICINE CLINIC | Facility: CLINIC | Age: 60
End: 2020-09-17

## 2020-09-17 DIAGNOSIS — M54.16 LUMBAR RADICULOPATHY: ICD-10-CM

## 2020-09-17 DIAGNOSIS — M51.36 DDD (DEGENERATIVE DISC DISEASE), LUMBAR: Primary | ICD-10-CM

## 2020-09-17 NOTE — TELEPHONE ENCOUNTER
Pt wife came in and and wants to know if you can increase the strength for his oxycodone  alsoneeds a referral to see the ortho surgeon- Dr Cecilia Meier  Pt is aware Dr  Is not in the office  m-[phone]

## 2020-09-18 RX ORDER — OXYCODONE HYDROCHLORIDE 15 MG/1
15 TABLET ORAL EVERY 4 HOURS PRN
Qty: 50 TABLET | Refills: 0 | Status: SHIPPED | OUTPATIENT
Start: 2020-09-18 | End: 2020-10-23 | Stop reason: ALTCHOICE

## 2020-09-22 ENCOUNTER — TELEPHONE (OUTPATIENT)
Dept: FAMILY MEDICINE CLINIC | Facility: CLINIC | Age: 60
End: 2020-09-22

## 2020-10-15 ENCOUNTER — TELEPHONE (OUTPATIENT)
Dept: FAMILY MEDICINE CLINIC | Facility: CLINIC | Age: 60
End: 2020-10-15

## 2020-10-20 ENCOUNTER — OFFICE VISIT (OUTPATIENT)
Dept: URGENT CARE | Facility: CLINIC | Age: 60
End: 2020-10-20
Payer: COMMERCIAL

## 2020-10-20 VITALS
HEIGHT: 70 IN | SYSTOLIC BLOOD PRESSURE: 132 MMHG | HEART RATE: 108 BPM | WEIGHT: 265 LBS | TEMPERATURE: 97.5 F | RESPIRATION RATE: 18 BRPM | OXYGEN SATURATION: 94 % | BODY MASS INDEX: 37.94 KG/M2 | DIASTOLIC BLOOD PRESSURE: 89 MMHG
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
## 2020-10-20 DIAGNOSIS — S70.362A TICK BITE OF LEFT THIGH, INITIAL ENCOUNTER: Primary | ICD-10-CM

## 2020-10-20 PROCEDURE — 99214 OFFICE O/P EST MOD 30 MIN: CPT | Performed by: PHYSICIAN ASSISTANT

## 2020-10-20 RX ORDER — DOXYCYCLINE 100 MG/1
200 TABLET ORAL ONCE
Qty: 2 TABLET | Refills: 0 | Status: SHIPPED | OUTPATIENT
Start: 2020-10-20 | End: 2020-10-20

## 2020-10-23 ENCOUNTER — OFFICE VISIT (OUTPATIENT)
Dept: FAMILY MEDICINE CLINIC | Facility: CLINIC | Age: 60
End: 2020-10-23
Payer: COMMERCIAL

## 2020-10-23 VITALS
HEIGHT: 70 IN | OXYGEN SATURATION: 94 % | SYSTOLIC BLOOD PRESSURE: 128 MMHG | WEIGHT: 268 LBS | BODY MASS INDEX: 38.37 KG/M2 | DIASTOLIC BLOOD PRESSURE: 86 MMHG | HEART RATE: 84 BPM | TEMPERATURE: 98.8 F

## 2020-10-23 DIAGNOSIS — M25.561 RIGHT KNEE PAIN, UNSPECIFIED CHRONICITY: ICD-10-CM

## 2020-10-23 DIAGNOSIS — Z09 FOLLOW-UP EXAM: Primary | ICD-10-CM

## 2020-10-23 PROCEDURE — 99213 OFFICE O/P EST LOW 20 MIN: CPT | Performed by: STUDENT IN AN ORGANIZED HEALTH CARE EDUCATION/TRAINING PROGRAM

## 2020-11-06 ENCOUNTER — OFFICE VISIT (OUTPATIENT)
Dept: FAMILY MEDICINE CLINIC | Facility: CLINIC | Age: 60
End: 2020-11-06
Payer: COMMERCIAL

## 2020-11-06 VITALS
WEIGHT: 271 LBS | OXYGEN SATURATION: 95 % | HEIGHT: 70 IN | TEMPERATURE: 98.2 F | DIASTOLIC BLOOD PRESSURE: 80 MMHG | BODY MASS INDEX: 38.8 KG/M2 | HEART RATE: 82 BPM | SYSTOLIC BLOOD PRESSURE: 130 MMHG | RESPIRATION RATE: 16 BRPM

## 2020-11-06 DIAGNOSIS — Z01.818 PREOPERATIVE EXAMINATION: ICD-10-CM

## 2020-11-06 DIAGNOSIS — Z72.0 TOBACCO USE: ICD-10-CM

## 2020-11-06 DIAGNOSIS — M10.9 GOUT, UNSPECIFIED CAUSE, UNSPECIFIED CHRONICITY, UNSPECIFIED SITE: ICD-10-CM

## 2020-11-06 DIAGNOSIS — M54.16 LUMBAR RADICULOPATHY: ICD-10-CM

## 2020-11-06 DIAGNOSIS — K21.9 GASTROESOPHAGEAL REFLUX DISEASE, UNSPECIFIED WHETHER ESOPHAGITIS PRESENT: ICD-10-CM

## 2020-11-06 DIAGNOSIS — R82.71 BACTERIURIA: Primary | ICD-10-CM

## 2020-11-06 DIAGNOSIS — M51.36 DDD (DEGENERATIVE DISC DISEASE), LUMBAR: ICD-10-CM

## 2020-11-06 PROCEDURE — 99244 OFF/OP CNSLTJ NEW/EST MOD 40: CPT | Performed by: FAMILY MEDICINE

## 2020-11-06 PROCEDURE — 3008F BODY MASS INDEX DOCD: CPT | Performed by: FAMILY MEDICINE

## 2020-11-06 PROCEDURE — 4004F PT TOBACCO SCREEN RCVD TLK: CPT | Performed by: FAMILY MEDICINE

## 2020-12-23 DIAGNOSIS — G89.4 CHRONIC PAIN SYNDROME: ICD-10-CM

## 2020-12-23 DIAGNOSIS — M25.50 ARTHRALGIA, UNSPECIFIED JOINT: ICD-10-CM

## 2020-12-23 NOTE — TELEPHONE ENCOUNTER
Medication:  PDMP   12/06/2020  1  11/16/2020  OXYCODONE HCL 5 MG TABLET  45 0  14  MOORE RUB  [id number]  BARBARA ([id number])  0  24 11 MME  Other  PA    11/16/2020  1  11/16/2020  DIAZEPAM 5 MG TABLET  20 0  6  MOORE RUB  [id number]  BARBARA ([id number])  0   Other  PA    11/12/2020  1  11/12/2020  OXYCODONE-ACETAMINOPHEN 5-325  75 0  25  AL PER  [id number]  BARBARA ([id number])  0  22 5 MME  Other  PA        Active agreement on file -Yes      Dr Chris Chapman pt  Dr Chris Chapman is out of the office

## 2020-12-24 RX ORDER — OXYCODONE HYDROCHLORIDE AND ACETAMINOPHEN 5; 325 MG/1; MG/1
1 TABLET ORAL EVERY 4 HOURS PRN
Qty: 10 TABLET | Refills: 0 | Status: SHIPPED | OUTPATIENT
Start: 2020-12-24 | End: 2021-01-18 | Stop reason: SDUPTHER

## 2020-12-24 RX ORDER — INDOMETHACIN 50 MG/1
50 CAPSULE ORAL
Qty: 30 CAPSULE | Refills: 3 | Status: SHIPPED | OUTPATIENT
Start: 2020-12-24 | End: 2021-03-18 | Stop reason: SDUPTHER

## 2021-01-07 ENCOUNTER — VBI (OUTPATIENT)
Dept: ADMINISTRATIVE | Facility: OTHER | Age: 61
End: 2021-01-07

## 2021-01-18 DIAGNOSIS — G89.4 CHRONIC PAIN SYNDROME: ICD-10-CM

## 2021-01-18 RX ORDER — OXYCODONE HYDROCHLORIDE AND ACETAMINOPHEN 5; 325 MG/1; MG/1
1 TABLET ORAL EVERY 4 HOURS PRN
Qty: 50 TABLET | Refills: 0 | Status: SHIPPED | OUTPATIENT
Start: 2021-01-18 | End: 2021-02-19 | Stop reason: SDUPTHER

## 2021-01-18 NOTE — TELEPHONE ENCOUNTER
Medication:  PDMP   12/24/2020  1  12/24/2020  OXYCODONE-ACETAMINOPHEN 5-325  10 0  2  AKANKSHA MAKAYLA  [id number]  BARBARA ([id number])  0  37 5 MME  Other  PA    12/06/2020  1  11/16/2020  OXYCODONE HCL 5 MG TABLET  45 0  14  MOORE RUB  [id number]  BARBARA ([id number])  0  24 11 MME  Other  PA    11/16/2020  1  11/16/2020  DIAZEPAM 5 MG TABLET  20 0  6  MOORE RUB  [id number]  BARBARA ([id number])  0   Other  PA        Active agreement on file -Yes

## 2021-02-19 DIAGNOSIS — G89.4 CHRONIC PAIN SYNDROME: ICD-10-CM

## 2021-02-19 RX ORDER — OXYCODONE HYDROCHLORIDE AND ACETAMINOPHEN 5; 325 MG/1; MG/1
1 TABLET ORAL EVERY 4 HOURS PRN
Qty: 50 TABLET | Refills: 0 | Status: SHIPPED | OUTPATIENT
Start: 2021-02-19 | End: 2021-03-18 | Stop reason: SDUPTHER

## 2021-02-19 NOTE — TELEPHONE ENCOUNTER
01/18/2021  1  01/18/2021  OXYCODONE-ACETAMINOPHEN 5-325  50 0  8  BR DARIEL  [id number]  BARBARA ([id number])  0  46 88 MME  Other  [place]    12/24/2020  1  12/24/2020  OXYCODONE-ACETAMINOPHEN 5-325  10 0  2  AKANKSHA MAKAYLA  [id number]  BARBARA ([id number])  0  37 5 MME  Other  [place]    12/06/2020  1  11/16/2020  OXYCODONE HCL 5 MG TABLET  45 0  14  MOORE RUB  [id number]  BARBARA ([id number])  0  24 11 MME  Other  [place]    11/16/2020  1  11/16/2020  DIAZEPAM 5 MG TABLET  20 0  6  MOORE RUB  [id number]  BARBARA ([id number])  0

## 2021-03-18 DIAGNOSIS — M25.50 ARTHRALGIA, UNSPECIFIED JOINT: ICD-10-CM

## 2021-03-18 DIAGNOSIS — G89.4 CHRONIC PAIN SYNDROME: ICD-10-CM

## 2021-03-18 RX ORDER — OXYCODONE HYDROCHLORIDE AND ACETAMINOPHEN 5; 325 MG/1; MG/1
1 TABLET ORAL EVERY 4 HOURS PRN
Qty: 50 TABLET | Refills: 0 | Status: SHIPPED | OUTPATIENT
Start: 2021-03-18 | End: 2021-04-19 | Stop reason: SDUPTHER

## 2021-03-18 RX ORDER — INDOMETHACIN 50 MG/1
50 CAPSULE ORAL
Qty: 30 CAPSULE | Refills: 3 | Status: SHIPPED | OUTPATIENT
Start: 2021-03-18 | End: 2022-01-13

## 2021-03-18 NOTE — TELEPHONE ENCOUNTER
Medication:  PDMP   02/19/2021  1  02/19/2021  OXYCODONE-ACETAMINOPHEN 5-325  50 0  8  BR DARIEL  [id number]  BARBARA ([id number])  0  46 88 MME  Other  PA    01/18/2021  1  01/18/2021  OXYCODONE-ACETAMINOPHEN 5-325  50 0  8  BR DARIEL  [id number]  BARBARA ([id number])  0  46 88 MME  Other  PA    12/24/2020  1  12/24/2020  OXYCODONE-ACETAMINOPHEN 5-325  10 0  2  AKANKSHA MAKAYLA  [id number]  BARBARA ([id number])  0  37 5 MME  Other  PA    12/06/2020  1  11/16/2020  OXYCODONE HCL 5 MG TABLET  45 0  14  MOORE RUB  [id number]  BARBARA ([id number])  0  24 11 MME  Other  PA    11/16/2020  1  11/16/2020  DIAZEPAM 5 MG TABLET  20 0  6  MOORE RUB  [id number]  BARBARA ([id number])  0   Other  PA        Active agreement on file -Yes

## 2021-03-27 ENCOUNTER — IMMUNIZATIONS (OUTPATIENT)
Dept: FAMILY MEDICINE CLINIC | Facility: HOSPITAL | Age: 61
End: 2021-03-27

## 2021-03-27 DIAGNOSIS — Z23 ENCOUNTER FOR IMMUNIZATION: Primary | ICD-10-CM

## 2021-03-27 PROCEDURE — 0001A SARS-COV-2 / COVID-19 MRNA VACCINE (PFIZER-BIONTECH) 30 MCG: CPT

## 2021-03-27 PROCEDURE — 91300 SARS-COV-2 / COVID-19 MRNA VACCINE (PFIZER-BIONTECH) 30 MCG: CPT

## 2021-04-01 ENCOUNTER — OFFICE VISIT (OUTPATIENT)
Dept: FAMILY MEDICINE CLINIC | Facility: CLINIC | Age: 61
End: 2021-04-01
Payer: COMMERCIAL

## 2021-04-01 VITALS
OXYGEN SATURATION: 98 % | WEIGHT: 269 LBS | HEART RATE: 82 BPM | DIASTOLIC BLOOD PRESSURE: 86 MMHG | HEIGHT: 70 IN | TEMPERATURE: 98.1 F | BODY MASS INDEX: 38.51 KG/M2 | SYSTOLIC BLOOD PRESSURE: 124 MMHG

## 2021-04-01 DIAGNOSIS — G25.81 RLS (RESTLESS LEGS SYNDROME): ICD-10-CM

## 2021-04-01 DIAGNOSIS — Z72.0 TOBACCO USE: ICD-10-CM

## 2021-04-01 DIAGNOSIS — E78.5 DYSLIPIDEMIA: ICD-10-CM

## 2021-04-01 DIAGNOSIS — Z00.00 HEALTH MAINTENANCE EXAMINATION: ICD-10-CM

## 2021-04-01 DIAGNOSIS — K21.9 GASTROESOPHAGEAL REFLUX DISEASE, UNSPECIFIED WHETHER ESOPHAGITIS PRESENT: ICD-10-CM

## 2021-04-01 DIAGNOSIS — Z11.4 SCREENING FOR HIV (HUMAN IMMUNODEFICIENCY VIRUS): ICD-10-CM

## 2021-04-01 DIAGNOSIS — Z23 ENCOUNTER FOR IMMUNIZATION: ICD-10-CM

## 2021-04-01 DIAGNOSIS — Z12.12 SCREENING FOR COLORECTAL CANCER: ICD-10-CM

## 2021-04-01 DIAGNOSIS — Z12.11 SCREENING FOR COLORECTAL CANCER: ICD-10-CM

## 2021-04-01 DIAGNOSIS — M51.36 DDD (DEGENERATIVE DISC DISEASE), LUMBAR: ICD-10-CM

## 2021-04-01 DIAGNOSIS — Z12.5 PROSTATE CANCER SCREENING: ICD-10-CM

## 2021-04-01 DIAGNOSIS — M20.41 HAMMER TOE OF SECOND TOE OF RIGHT FOOT: ICD-10-CM

## 2021-04-01 DIAGNOSIS — M10.9 GOUT, UNSPECIFIED CAUSE, UNSPECIFIED CHRONICITY, UNSPECIFIED SITE: ICD-10-CM

## 2021-04-01 DIAGNOSIS — M77.8 TENDINITIS OF RIGHT SHOULDER: Primary | ICD-10-CM

## 2021-04-01 PROBLEM — R82.71 BACTERIURIA: Status: RESOLVED | Noted: 2020-11-06 | Resolved: 2021-04-01

## 2021-04-01 PROBLEM — Z01.818 PREOPERATIVE EXAMINATION: Status: RESOLVED | Noted: 2020-11-06 | Resolved: 2021-04-01

## 2021-04-01 PROCEDURE — 20610 DRAIN/INJ JOINT/BURSA W/O US: CPT | Performed by: FAMILY MEDICINE

## 2021-04-01 PROCEDURE — 99396 PREV VISIT EST AGE 40-64: CPT | Performed by: FAMILY MEDICINE

## 2021-04-01 RX ORDER — METHYLPREDNISOLONE ACETATE 40 MG/ML
1 INJECTION, SUSPENSION INTRA-ARTICULAR; INTRALESIONAL; INTRAMUSCULAR; SOFT TISSUE
Status: COMPLETED | OUTPATIENT
Start: 2021-04-01 | End: 2021-04-01

## 2021-04-01 RX ADMIN — METHYLPREDNISOLONE ACETATE 1 ML: 40 INJECTION, SUSPENSION INTRA-ARTICULAR; INTRALESIONAL; INTRAMUSCULAR; SOFT TISSUE at 17:19

## 2021-04-01 NOTE — PROGRESS NOTES
BMI Counseling: Body mass index is 38 6 kg/m²  The BMI is above normal  Nutrition recommendations include decreasing portion sizes and moderation in carbohydrate intake  Exercise recommendations include exercising 3-5 times per week  No pharmacotherapy was ordered  Assessment/Plan:     return visit for annual physical   We will call with results of his blood tests we did discuss restarting allopurinol a if his uric acid level is again elevated  Problem List Items Addressed This Visit        Digestive    Acid reflux       Continue Prilosec            Musculoskeletal and Integument    Tendinitis of right shoulder    DDD (degenerative disc disease), lumbar    Hammer toe of second toe of right foot      We discussed various treatment options for hammertoe  Other    Dyslipidemia    Relevant Orders    CBC and differential    Comprehensive metabolic panel    Lipid panel    Gout       Continue colchicine 0 6 mg daily         Relevant Orders    Uric acid    RLS (restless legs syndrome)      Continue Requip 1 mg q h s  Health maintenance examination - Primary    Tobacco use      Smoking cessation recommended           Other Visit Diagnoses     Encounter for immunization        Screening for HIV (human immunodeficiency virus)        Screening for colorectal cancer        Prostate cancer screening        Relevant Orders    PSA, Total Screen            Subjective:      Patient ID: Gabino Pearce is a 63 y o  male  Patient comes in for annual checkup  Since he was here last he had lumbar laminectomy and his back is doing much better  He has returned to work he is now complaining of pain in right shoulder and right 2nd toe        The following portions of the patient's history were reviewed and updated as appropriate:   Past Medical History:  He has a past medical history of Gout ,  _______________________________________________________________________  Medical Problems:  does not have any pertinent problems on file ,  _______________________________________________________________________  Past Surgical History:   has a past surgical history that includes Cataract extraction; Knee surgery; Rotator cuff repair; and Back surgery  ,  _______________________________________________________________________  Family History:  family history includes Esophageal cancer in his father; Lung cancer in his father; Pancreatic cancer in his father; Stroke in his family; Thyroid nodules in his father ,  _______________________________________________________________________  Social History:   reports that he has been smoking cigars  He has never used smokeless tobacco  He reports current alcohol use  He reports that he does not use drugs  ,  _______________________________________________________________________  Allergies:  has No Known Allergies     _______________________________________________________________________  Current Outpatient Medications   Medication Sig Dispense Refill    colchicine (COLCRYS) 0 6 mg tablet TAKE 1 TABLET TWICE A DAY AND EVERY 3 HOURS AS NEEDED FOR GOUT AS DIRECTED 200 tablet 3    gabapentin (NEURONTIN) 300 mg capsule Take 1 capsule (300 mg total) by mouth 3 (three) times a day 90 capsule 3    indomethacin (INDOCIN) 50 mg capsule Take 1 capsule (50 mg total) by mouth 3 (three) times a day with meals 30 capsule 3    levocetirizine (XYZAL) 5 MG tablet Take 5 mg by mouth every evening       omeprazole (PriLOSEC) 40 MG capsule TAKE 1 CAPSULE DAILY 90 capsule 3    oxyCODONE-acetaminophen (PERCOCET) 5-325 mg per tablet Take 1 tablet by mouth every 4 (four) hours as needed for moderate painMax Daily Amount: 6 tablets 50 tablet 0    rOPINIRole (REQUIP) 1 mg tablet TAKE 1 TABLET DAILY AT BEDTIME 90 tablet 3     Current Facility-Administered Medications   Medication Dose Route Frequency Provider Last Rate Last Admin    cyanocobalamin injection 1,000 mcg  1,000 mcg Intramuscular Q30 Days Lazaro Snow MD   1,000 mcg at 05/11/18 1643     _______________________________________________________________________  Review of Systems   Constitutional: Negative  HENT: Negative  Respiratory: Negative  Cardiovascular: Negative  Gastrointestinal: Negative  Endocrine: Negative  Musculoskeletal: Positive for arthralgias  Allergic/Immunologic: Negative  Neurological: Negative  Hematological: Negative  Objective:  Vitals:    04/01/21 1604   BP: 124/86   BP Location: Left arm   Patient Position: Sitting   Cuff Size: Large   Pulse: 82   Temp: 98 1 °F (36 7 °C)   TempSrc: Tympanic   SpO2: 98%   Weight: 122 kg (269 lb)   Height: 5' 10" (1 778 m)     Body mass index is 38 6 kg/m²  Physical Exam  Constitutional:       Appearance: He is well-developed  He is obese  HENT:      Head: Normocephalic and atraumatic  Right Ear: Tympanic membrane, ear canal and external ear normal       Left Ear: Tympanic membrane, ear canal and external ear normal    Eyes:      Pupils: Pupils are equal, round, and reactive to light  Neck:      Musculoskeletal: Neck supple  Cardiovascular:      Rate and Rhythm: Normal rate and regular rhythm  Heart sounds: Normal heart sounds  Pulmonary:      Effort: Pulmonary effort is normal       Breath sounds: Normal breath sounds  Abdominal:      General: Bowel sounds are normal       Palpations: Abdomen is soft  Musculoskeletal:      Comments: Normal appearing right shoulder which is nontender  Painful abduction and flexion  Hammertoe of right 2nd toe  Skin:     General: Skin is warm and dry  Neurological:      Mental Status: He is alert and oriented to person, place, and time  Psychiatric:         Thought Content: Thought content normal        Large joint arthrocentesis: R glenohumeral  [place] Protocol:  Consent: Verbal consent obtained    Risks and benefits: risks, benefits and alternatives were discussed  Consent given by: patient  Patient identity confirmed: verbally with patient    Supporting Documentation  Indications: pain   Procedure Details  Location: shoulder - R glenohumeral  Preparation: Patient was prepped and draped in the usual sterile fashion  Needle size: 25 G  Approach: posterior  Medications administered: 1 mL methylPREDNISolone acetate 40 mg/mL    Patient tolerance: patient tolerated the procedure well with no immediate complications  Dressing:  Sterile dressing applied

## 2021-04-17 ENCOUNTER — IMMUNIZATIONS (OUTPATIENT)
Dept: FAMILY MEDICINE CLINIC | Facility: HOSPITAL | Age: 61
End: 2021-04-17

## 2021-04-17 DIAGNOSIS — Z23 ENCOUNTER FOR IMMUNIZATION: Primary | ICD-10-CM

## 2021-04-17 PROCEDURE — 0002A SARS-COV-2 / COVID-19 MRNA VACCINE (PFIZER-BIONTECH) 30 MCG: CPT

## 2021-04-17 PROCEDURE — 91300 SARS-COV-2 / COVID-19 MRNA VACCINE (PFIZER-BIONTECH) 30 MCG: CPT

## 2021-04-19 DIAGNOSIS — G89.4 CHRONIC PAIN SYNDROME: ICD-10-CM

## 2021-04-19 RX ORDER — OXYCODONE HYDROCHLORIDE AND ACETAMINOPHEN 5; 325 MG/1; MG/1
1 TABLET ORAL EVERY 4 HOURS PRN
Qty: 50 TABLET | Refills: 0 | Status: SHIPPED | OUTPATIENT
Start: 2021-04-19 | End: 2021-05-17 | Stop reason: SDUPTHER

## 2021-04-19 NOTE — TELEPHONE ENCOUNTER
Medication:  PDMP   03/18/2021  1  03/18/2021  OXYCODONE-ACETAMINOPHEN 5-325  50 0  8  BR DARIEL  [id number]  BARBARA ([id number])  0  46 88 MME  Other  PA    02/19/2021  1  02/19/2021  OXYCODONE-ACETAMINOPHEN 5-325  50 0  8  BR DARIEL  [id number]  BARBARA ([id number])  0  46 88 MME  Other  PA    01/18/2021  1  01/18/2021  OXYCODONE-ACETAMINOPHEN 5-325  50 0  8  BR DARIEL  [id number]  BARBARA ([id number])  0  46 88 MME  Other  PA    12/24/2020  1  12/24/2020  OXYCODONE-ACETAMINOPHEN 5-325  10 0  2  AKANKSHA MAKAYLA  [id number]  BARBARA ([id number])  0  37 5 MME  Other  PA        Active agreement on file -No

## 2021-04-23 ENCOUNTER — TELEPHONE (OUTPATIENT)
Dept: FAMILY MEDICINE CLINIC | Facility: CLINIC | Age: 61
End: 2021-04-23

## 2021-04-23 NOTE — TELEPHONE ENCOUNTER
[facility] submitted prior auth for pt via Covermymeds - on 04/23/2021  Key: [id number]  oxyCODONE-Acetaminophen 5-325MG tablets     In process with SHARON

## 2021-04-26 NOTE — TELEPHONE ENCOUNTER
Med got approved via [employer] at [phone], pt's copay $[phone] 13  Pt picked up med already (as per pharmacist)

## 2021-05-17 DIAGNOSIS — G89.4 CHRONIC PAIN SYNDROME: ICD-10-CM

## 2021-05-17 RX ORDER — OXYCODONE HYDROCHLORIDE AND ACETAMINOPHEN 5; 325 MG/1; MG/1
1 TABLET ORAL EVERY 4 HOURS PRN
Qty: 50 TABLET | Refills: 0 | Status: SHIPPED | OUTPATIENT
Start: 2021-05-17 | End: 2021-06-21 | Stop reason: SDUPTHER

## 2021-05-17 NOTE — TELEPHONE ENCOUNTER
Medication:  PDMP   04/21/2021  1  04/19/2021  OXYCODONE-ACETAMINOPHEN 5-325  50 0  8  BR DARIEL  [id number]  BARBARA ([id number])  0  46 88 MME  Other  PA    03/18/2021  1  03/18/2021  OXYCODONE-ACETAMINOPHEN 5-325  50 0  8  BR DARIEL  [id number]  BARBARA ([id number])  0  46 88 MME  Other  PA    02/19/2021  1  02/19/2021  OXYCODONE-ACETAMINOPHEN 5-325  50 0  8  BR DARIEL  [id number]  BARBARA ([id number])  0          Active agreement on file -No

## 2021-06-14 ENCOUNTER — APPOINTMENT (OUTPATIENT)
Dept: LAB | Facility: HOSPITAL | Age: 61
End: 2021-06-14
Attending: FAMILY MEDICINE
Payer: COMMERCIAL

## 2021-06-14 DIAGNOSIS — R82.71 BACTERIURIA: ICD-10-CM

## 2021-06-14 DIAGNOSIS — M10.9 GOUT, UNSPECIFIED CAUSE, UNSPECIFIED CHRONICITY, UNSPECIFIED SITE: ICD-10-CM

## 2021-06-14 DIAGNOSIS — E78.5 DYSLIPIDEMIA: ICD-10-CM

## 2021-06-14 DIAGNOSIS — Z12.5 PROSTATE CANCER SCREENING: ICD-10-CM

## 2021-06-14 LAB
ALBUMIN SERPL BCP-MCNC: 4 G/DL (ref 3.5–5)
ALP SERPL-CCNC: 86 U/L (ref 46–116)
ALT SERPL W P-5'-P-CCNC: 30 U/L (ref 12–78)
ANION GAP SERPL CALCULATED.3IONS-SCNC: 10 MMOL/L (ref 4–13)
AST SERPL W P-5'-P-CCNC: 22 U/L (ref 5–45)
BASOPHILS # BLD AUTO: 0.11 THOUSANDS/ΜL (ref 0–0.1)
BASOPHILS NFR BLD AUTO: 1 % (ref 0–1)
BILIRUB SERPL-MCNC: 0.68 MG/DL (ref 0.2–1)
BUN SERPL-MCNC: 13 MG/DL (ref 5–25)
CALCIUM SERPL-MCNC: 9.2 MG/DL (ref 8.3–10.1)
CHLORIDE SERPL-SCNC: 104 MMOL/L (ref 100–108)
CHOLEST SERPL-MCNC: 183 MG/DL (ref 50–200)
CO2 SERPL-SCNC: 27 MMOL/L (ref 21–32)
CREAT SERPL-MCNC: 1.03 MG/DL (ref 0.6–1.3)
EOSINOPHIL # BLD AUTO: 0.4 THOUSAND/ΜL (ref 0–0.61)
EOSINOPHIL NFR BLD AUTO: 5 % (ref 0–6)
ERYTHROCYTE [DISTWIDTH] IN BLOOD BY AUTOMATED COUNT: 14 % (ref 11.6–15.1)
GFR SERPL CREATININE-BSD FRML MDRD: 79 ML/MIN/1.73SQ M
GLUCOSE P FAST SERPL-MCNC: 101 MG/DL (ref 65–99)
HCT VFR BLD AUTO: 47 % (ref 36.5–49.3)
HDLC SERPL-MCNC: 42 MG/DL
HGB BLD-MCNC: 15.6 G/DL (ref 12–17)
IMM GRANULOCYTES # BLD AUTO: 0.03 THOUSAND/UL (ref 0–0.2)
IMM GRANULOCYTES NFR BLD AUTO: 0 % (ref 0–2)
LDLC SERPL CALC-MCNC: 108 MG/DL (ref 0–100)
LYMPHOCYTES # BLD AUTO: 1.89 THOUSANDS/ΜL (ref 0.6–4.47)
LYMPHOCYTES NFR BLD AUTO: 24 % (ref 14–44)
MCH RBC QN AUTO: 29.3 PG (ref 26.8–34.3)
MCHC RBC AUTO-ENTMCNC: 33.2 G/DL (ref 31.4–37.4)
MCV RBC AUTO: 88 FL (ref 82–98)
MONOCYTES # BLD AUTO: 0.62 THOUSAND/ΜL (ref 0.17–1.22)
MONOCYTES NFR BLD AUTO: 8 % (ref 4–12)
NEUTROPHILS # BLD AUTO: 4.85 THOUSANDS/ΜL (ref 1.85–7.62)
NEUTS SEG NFR BLD AUTO: 62 % (ref 43–75)
NONHDLC SERPL-MCNC: 141 MG/DL
NRBC BLD AUTO-RTO: 0 /100 WBCS
PLATELET # BLD AUTO: 306 THOUSANDS/UL (ref 149–390)
PMV BLD AUTO: 9.2 FL (ref 8.9–12.7)
POTASSIUM SERPL-SCNC: 3.9 MMOL/L (ref 3.5–5.3)
PROT SERPL-MCNC: 7.8 G/DL (ref 6.4–8.2)
PSA SERPL-MCNC: 0.5 NG/ML (ref 0–4)
RBC # BLD AUTO: 5.32 MILLION/UL (ref 3.88–5.62)
SODIUM SERPL-SCNC: 141 MMOL/L (ref 136–145)
TRIGL SERPL-MCNC: 165 MG/DL
URATE SERPL-MCNC: 7.9 MG/DL (ref 4.2–8)
WBC # BLD AUTO: 7.9 THOUSAND/UL (ref 4.31–10.16)

## 2021-06-14 PROCEDURE — 87086 URINE CULTURE/COLONY COUNT: CPT

## 2021-06-14 PROCEDURE — 85025 COMPLETE CBC W/AUTO DIFF WBC: CPT

## 2021-06-14 PROCEDURE — 36415 COLL VENOUS BLD VENIPUNCTURE: CPT

## 2021-06-14 PROCEDURE — 84550 ASSAY OF BLOOD/URIC ACID: CPT

## 2021-06-14 PROCEDURE — 80061 LIPID PANEL: CPT

## 2021-06-14 PROCEDURE — G0103 PSA SCREENING: HCPCS

## 2021-06-14 PROCEDURE — 80053 COMPREHEN METABOLIC PANEL: CPT

## 2021-06-15 ENCOUNTER — TELEPHONE (OUTPATIENT)
Dept: FAMILY MEDICINE CLINIC | Facility: CLINIC | Age: 61
End: 2021-06-15

## 2021-06-15 LAB — BACTERIA UR CULT: NORMAL

## 2021-06-21 DIAGNOSIS — G89.4 CHRONIC PAIN SYNDROME: ICD-10-CM

## 2021-06-21 RX ORDER — OXYCODONE HYDROCHLORIDE AND ACETAMINOPHEN 5; 325 MG/1; MG/1
1 TABLET ORAL EVERY 4 HOURS PRN
Qty: 50 TABLET | Refills: 0 | Status: SHIPPED | OUTPATIENT
Start: 2021-06-21 | End: 2021-07-19 | Stop reason: SDUPTHER

## 2021-06-21 NOTE — TELEPHONE ENCOUNTER
Medication:  PDMP     05/18/2021  1  05/17/2021  OXYCODONE-ACETAMINOPHEN 5-325  50 0  8  BR DARIEL  [id number]  BARBARA ([id number])  0  46 88 MME  Other  PA    04/21/2021  1  04/19/2021  OXYCODONE-ACETAMINOPHEN 5-325  50 0  8  BR DARIEL  [id number]  BARBARA ([id number])  0  46 88 MME  Other  PA    03/18/2021  1  03/18/2021  OXYCODONE-ACETAMINOPHEN 5-325  50 0  8  BR DARIEL  [id number]  BARBARA ([id number])  0  46 88 MME  Other  PA      Active agreement on file -No

## 2021-07-19 DIAGNOSIS — G89.4 CHRONIC PAIN SYNDROME: ICD-10-CM

## 2021-07-19 DIAGNOSIS — K21.9 GASTROESOPHAGEAL REFLUX DISEASE: ICD-10-CM

## 2021-07-19 RX ORDER — OXYCODONE HYDROCHLORIDE AND ACETAMINOPHEN 5; 325 MG/1; MG/1
1 TABLET ORAL EVERY 4 HOURS PRN
Qty: 50 TABLET | Refills: 0 | Status: SHIPPED | OUTPATIENT
Start: 2021-07-19 | End: 2021-08-18 | Stop reason: SDUPTHER

## 2021-07-19 RX ORDER — OMEPRAZOLE 40 MG/1
CAPSULE, DELAYED RELEASE ORAL
Qty: 90 CAPSULE | Refills: 3 | Status: SHIPPED | OUTPATIENT
Start: 2021-07-19 | End: 2022-07-14

## 2021-08-18 DIAGNOSIS — G89.4 CHRONIC PAIN SYNDROME: ICD-10-CM

## 2021-08-18 RX ORDER — OXYCODONE HYDROCHLORIDE AND ACETAMINOPHEN 5; 325 MG/1; MG/1
1 TABLET ORAL EVERY 4 HOURS PRN
Qty: 50 TABLET | Refills: 0 | Status: SHIPPED | OUTPATIENT
Start: 2021-08-18 | End: 2021-09-17 | Stop reason: SDUPTHER

## 2021-09-13 DIAGNOSIS — M10.9 GOUT, UNSPECIFIED CAUSE, UNSPECIFIED CHRONICITY, UNSPECIFIED SITE: ICD-10-CM

## 2021-09-13 RX ORDER — COLCHICINE 0.6 MG/1
TABLET ORAL
Qty: 200 TABLET | Refills: 3 | Status: SHIPPED | OUTPATIENT
Start: 2021-09-13

## 2021-09-17 DIAGNOSIS — G89.4 CHRONIC PAIN SYNDROME: ICD-10-CM

## 2021-09-17 RX ORDER — OXYCODONE HYDROCHLORIDE AND ACETAMINOPHEN 5; 325 MG/1; MG/1
1 TABLET ORAL EVERY 4 HOURS PRN
Qty: 50 TABLET | Refills: 0 | Status: SHIPPED | OUTPATIENT
Start: 2021-09-17 | End: 2021-10-18 | Stop reason: SDUPTHER

## 2021-09-17 NOTE — TELEPHONE ENCOUNTER
Medication:  PDMP   08/18/2021  1  08/18/2021  OXYCODONE-ACETAMINOPHEN 5-325  50 0  8  BR DARIEL  [id number]  BARBARA ([id number])  0  46 88 MME  Other  PA    07/19/2021  1  07/19/2021  OXYCODONE-ACETAMINOPHEN 5-325  50 0  8  BR DARIEL  [id number]  BARBARA ([id number])  0  46 88 MME  Other  PA    06/21/2021  1  06/21/2021  OXYCODONE-ACETAMINOPHEN 5-325  50 0  8  BR DARIEL  [id number]  BARBARA ([id number])  0  46 88 MME  Other  PA    05/18/2021  1  05/17/2021  OXYCODONE-ACETAMINOPHEN 5-325  50 0  8  BR DARIEL  [id number]  BARBARA ([id number])  0  46 88 MME  Other  PA      Active agreement on file -No

## 2021-10-18 DIAGNOSIS — G89.4 CHRONIC PAIN SYNDROME: ICD-10-CM

## 2021-10-18 RX ORDER — OXYCODONE HYDROCHLORIDE AND ACETAMINOPHEN 5; 325 MG/1; MG/1
1 TABLET ORAL EVERY 4 HOURS PRN
Qty: 50 TABLET | Refills: 0 | Status: SHIPPED | OUTPATIENT
Start: 2021-10-18 | End: 2021-11-17 | Stop reason: SDUPTHER

## 2021-10-25 ENCOUNTER — TELEPHONE (OUTPATIENT)
Dept: FAMILY MEDICINE CLINIC | Facility: CLINIC | Age: 61
End: 2021-10-25

## 2021-10-25 DIAGNOSIS — T75.3XXA MOTION SICKNESS, INITIAL ENCOUNTER: Primary | ICD-10-CM

## 2021-10-25 RX ORDER — SCOLOPAMINE TRANSDERMAL SYSTEM 1 MG/1
1 PATCH, EXTENDED RELEASE TRANSDERMAL
Qty: 4 PATCH | Refills: 1 | Status: SHIPPED | OUTPATIENT
Start: 2021-10-25 | End: 2022-03-22

## 2021-11-17 DIAGNOSIS — G89.4 CHRONIC PAIN SYNDROME: ICD-10-CM

## 2021-11-18 RX ORDER — OXYCODONE HYDROCHLORIDE AND ACETAMINOPHEN 5; 325 MG/1; MG/1
1 TABLET ORAL EVERY 4 HOURS PRN
Qty: 50 TABLET | Refills: 0 | Status: SHIPPED | OUTPATIENT
Start: 2021-11-18 | End: 2021-12-13 | Stop reason: SDUPTHER

## 2021-12-13 DIAGNOSIS — G89.4 CHRONIC PAIN SYNDROME: ICD-10-CM

## 2021-12-14 RX ORDER — OXYCODONE HYDROCHLORIDE AND ACETAMINOPHEN 5; 325 MG/1; MG/1
1 TABLET ORAL EVERY 4 HOURS PRN
Qty: 50 TABLET | Refills: 0 | Status: SHIPPED | OUTPATIENT
Start: 2021-12-14 | End: 2022-01-18 | Stop reason: SDUPTHER

## 2021-12-29 ENCOUNTER — VBI (OUTPATIENT)
Dept: ADMINISTRATIVE | Facility: OTHER | Age: 61
End: 2021-12-29

## 2022-01-13 ENCOUNTER — OFFICE VISIT (OUTPATIENT)
Dept: FAMILY MEDICINE CLINIC | Facility: CLINIC | Age: 62
End: 2022-01-13
Payer: COMMERCIAL

## 2022-01-13 VITALS
TEMPERATURE: 98.1 F | HEART RATE: 90 BPM | OXYGEN SATURATION: 93 % | DIASTOLIC BLOOD PRESSURE: 94 MMHG | SYSTOLIC BLOOD PRESSURE: 154 MMHG | HEIGHT: 70 IN | WEIGHT: 274 LBS | BODY MASS INDEX: 39.22 KG/M2

## 2022-01-13 DIAGNOSIS — I10 HYPERTENSION, UNSPECIFIED TYPE: ICD-10-CM

## 2022-01-13 DIAGNOSIS — Z12.11 COLON CANCER SCREENING: Primary | ICD-10-CM

## 2022-01-13 DIAGNOSIS — Z23 ENCOUNTER FOR IMMUNIZATION: ICD-10-CM

## 2022-01-13 PROCEDURE — 90682 RIV4 VACC RECOMBINANT DNA IM: CPT

## 2022-01-13 PROCEDURE — 99214 OFFICE O/P EST MOD 30 MIN: CPT | Performed by: STUDENT IN AN ORGANIZED HEALTH CARE EDUCATION/TRAINING PROGRAM

## 2022-01-13 PROCEDURE — 4004F PT TOBACCO SCREEN RCVD TLK: CPT | Performed by: STUDENT IN AN ORGANIZED HEALTH CARE EDUCATION/TRAINING PROGRAM

## 2022-01-13 PROCEDURE — 90471 IMMUNIZATION ADMIN: CPT

## 2022-01-13 PROCEDURE — 3725F SCREEN DEPRESSION PERFORMED: CPT | Performed by: STUDENT IN AN ORGANIZED HEALTH CARE EDUCATION/TRAINING PROGRAM

## 2022-01-13 PROCEDURE — 3008F BODY MASS INDEX DOCD: CPT | Performed by: STUDENT IN AN ORGANIZED HEALTH CARE EDUCATION/TRAINING PROGRAM

## 2022-01-13 RX ORDER — LISINOPRIL 10 MG/1
10 TABLET ORAL DAILY
Qty: 45 TABLET | Refills: 0 | Status: SHIPPED | OUTPATIENT
Start: 2022-01-13 | End: 2022-03-02 | Stop reason: SDUPTHER

## 2022-01-13 NOTE — PROGRESS NOTES
Assessment/Plan:         Problem List Items Addressed This Visit     None      Visit Diagnoses     Colon cancer screening    -  Primary    Relevant Orders    Cologuard    Encounter for immunization        Relevant Orders    influenza vaccine, quadrivalent, recombinant, PF, 0 5 mL, for patients 18 yr+ (FLUBLOK) (Completed)    Hypertension, unspecified type        Relevant Medications    lisinopril (ZESTRIL) 10 mg tablet    Other Relevant Orders    TSH, 3rd generation with Free T4 reflex    Comprehensive metabolic panel    CBC and differential    HEMOGLOBIN A1C W/ EAG ESTIMATION    Lipid panel        Will get blood work, lisinpril daily, check bp at home 2-3x daily and close follow up    Subjective:      Patient ID: Toñito Mccallum is a 63 y o  male  HPI    Patient coming today to be evaluated for high blood pressure  He was found work physical today and have blood pressure of 158/110  He reports normal blood pressures in the past over last few months his nose he has been dizzy  Has not reported any changes in diet but was on vacation recently  Denies any chest pain or shortness of breath  There is no family history of cardiac events but there is high blood pressure    The following portions of the patient's history were reviewed and updated as appropriate:   Past Medical History:  He has a past medical history of Gout ,  _______________________________________________________________________  Medical Problems:  does not have any pertinent problems on file ,  _______________________________________________________________________  Past Surgical History:   has a past surgical history that includes Cataract extraction; Knee surgery; Rotator cuff repair; and Back surgery  ,  _______________________________________________________________________  Family History:  family history includes Esophageal cancer in his father; Lung cancer in his father; Pancreatic cancer in his father; Stroke in his family;  Thyroid nodules in his father ,  _______________________________________________________________________  Social History:   reports that he has been smoking cigars  He has never used smokeless tobacco  He reports current alcohol use  He reports that he does not use drugs  ,  _______________________________________________________________________  Allergies:  has No Known Allergies     _______________________________________________________________________  Current Outpatient Medications   Medication Sig Dispense Refill    colchicine (COLCRYS) 0 6 mg tablet TAKE 1 TABLET TWICE A DAY AND EVERY 3 HOURS AS NEEDED FOR GOUT AS DIRECTED 200 tablet 3    levocetirizine (XYZAL) 5 MG tablet Take 5 mg by mouth every evening       Melatonin 1 MG CAPS Take 1 mg by mouth in the morning      omeprazole (PriLOSEC) 40 MG capsule TAKE 1 CAPSULE DAILY 90 capsule 3    oxyCODONE-acetaminophen (PERCOCET) 5-325 mg per tablet Take 1 tablet by mouth every 4 (four) hours as needed for moderate pain Max Daily Amount: 6 tablets 50 tablet 0    rOPINIRole (REQUIP) 1 mg tablet TAKE 1 TABLET DAILY AT BEDTIME 90 tablet 3    lisinopril (ZESTRIL) 10 mg tablet Take 1 tablet (10 mg total) by mouth daily 45 tablet 0    scopolamine (TRANSDERM-SCOP) 1 5 mg/3 days TD 72 hr patch Place 1 patch on the skin every third day (Patient not taking: Reported on 1/13/2022 ) 4 patch 1     Current Facility-Administered Medications   Medication Dose Route Frequency Provider Last Rate Last Admin    cyanocobalamin injection 1,000 mcg  1,000 mcg Intramuscular Q30 Days Robert Saber, MD   1,000 mcg at 05/11/18 1643     _______________________________________________________________________  Review of Systems   Constitutional: Negative for chills, fatigue and fever  HENT: Negative for rhinorrhea and sore throat  Eyes: Negative for visual disturbance  Respiratory: Negative for cough and shortness of breath  Cardiovascular: Negative for chest pain and palpitations  Gastrointestinal: Negative for abdominal pain, constipation, diarrhea, nausea and vomiting  Genitourinary: Negative for difficulty urinating, dysuria and frequency  Musculoskeletal: Negative for arthralgias and myalgias  Skin: Negative for color change and rash  Neurological: Negative for weakness and headaches  Objective:  Vitals:    01/13/22 1422 01/13/22 1442   BP: (!) 162/112 154/94   BP Location: Left arm    Patient Position: Sitting    Cuff Size: Large    Pulse: 90    Temp: 98 1 °F (36 7 °C)    SpO2: 93%    Weight: 124 kg (274 lb)    Height: 5' 10" (1 778 m)      Body mass index is 39 31 kg/m²  Physical Exam  Constitutional:       General: He is not in acute distress  Appearance: He is not ill-appearing  HENT:      Head: Normocephalic and atraumatic  Right Ear: External ear normal       Left Ear: External ear normal       Nose: Nose normal  No congestion or rhinorrhea  Mouth/Throat:      Mouth: Mucous membranes are moist       Pharynx: Oropharynx is clear  No oropharyngeal exudate or posterior oropharyngeal erythema  Eyes:      Extraocular Movements: Extraocular movements intact  Conjunctiva/sclera: Conjunctivae normal       Pupils: Pupils are equal, round, and reactive to light  Cardiovascular:      Rate and Rhythm: Normal rate and regular rhythm  Pulses: Normal pulses  Heart sounds: No murmur heard  Pulmonary:      Effort: Pulmonary effort is normal  No respiratory distress  Breath sounds: Normal breath sounds  No wheezing  Chest:      Chest wall: No tenderness  Abdominal:      General: Bowel sounds are normal       Palpations: Abdomen is soft  Tenderness: There is no abdominal tenderness  Musculoskeletal:         General: Normal range of motion  Cervical back: Normal range of motion  Skin:     General: Skin is warm and dry  Capillary Refill: Capillary refill takes less than 2 seconds  Findings: No rash  Neurological:      General: No focal deficit present  Mental Status: He is alert  Mental status is at baseline

## 2022-01-18 DIAGNOSIS — G89.4 CHRONIC PAIN SYNDROME: ICD-10-CM

## 2022-01-18 RX ORDER — OXYCODONE HYDROCHLORIDE AND ACETAMINOPHEN 5; 325 MG/1; MG/1
1 TABLET ORAL EVERY 4 HOURS PRN
Qty: 50 TABLET | Refills: 0 | Status: SHIPPED | OUTPATIENT
Start: 2022-01-18 | End: 2022-02-17 | Stop reason: SDUPTHER

## 2022-01-31 DIAGNOSIS — M25.50 ARTHRALGIA, UNSPECIFIED JOINT: ICD-10-CM

## 2022-02-01 RX ORDER — INDOMETHACIN 50 MG/1
CAPSULE ORAL
Qty: 30 CAPSULE | Refills: 0 | Status: SHIPPED | OUTPATIENT
Start: 2022-02-01 | End: 2022-04-07

## 2022-02-17 DIAGNOSIS — G89.4 CHRONIC PAIN SYNDROME: ICD-10-CM

## 2022-02-17 RX ORDER — OXYCODONE HYDROCHLORIDE AND ACETAMINOPHEN 5; 325 MG/1; MG/1
1 TABLET ORAL EVERY 4 HOURS PRN
Qty: 50 TABLET | Refills: 0 | Status: SHIPPED | OUTPATIENT
Start: 2022-02-17 | End: 2022-03-17 | Stop reason: SDUPTHER

## 2022-02-23 ENCOUNTER — RA CDI HCC (OUTPATIENT)
Dept: OTHER | Facility: HOSPITAL | Age: 62
End: 2022-02-23

## 2022-02-23 NOTE — PROGRESS NOTES
[facility]  coding opportunities             Chart Reviewed * (Number of) Inba[place] suggestions sent to Provider: 1                  Patients insurance company: [email or web address] (Medicare and Commercial for [employer] and [facility])           Based on clinical documentation indicated in your record, it appears that the patient may have the following conditions:    E66 01 Morbid Obesity (BMI 39 31 with HTN, GERD)    Please review/recertify the BPA diagnoses for 2022      If this is correct, please document and assess at your next visit, Feb 28

## 2022-02-24 PROBLEM — E66.01 MORBID OBESITY (HCC): Status: ACTIVE | Noted: 2022-02-24

## 2022-03-02 DIAGNOSIS — I10 HYPERTENSION, UNSPECIFIED TYPE: ICD-10-CM

## 2022-03-02 RX ORDER — LISINOPRIL 10 MG/1
10 TABLET ORAL DAILY
Qty: 45 TABLET | Refills: 0 | Status: SHIPPED | OUTPATIENT
Start: 2022-03-02 | End: 2022-03-11 | Stop reason: SDUPTHER

## 2022-03-11 DIAGNOSIS — I10 HYPERTENSION, UNSPECIFIED TYPE: ICD-10-CM

## 2022-03-11 RX ORDER — LISINOPRIL 10 MG/1
10 TABLET ORAL DAILY
Qty: 90 TABLET | Refills: 0 | Status: SHIPPED | OUTPATIENT
Start: 2022-03-11 | End: 2022-03-22 | Stop reason: SDUPTHER

## 2022-03-17 DIAGNOSIS — G89.4 CHRONIC PAIN SYNDROME: ICD-10-CM

## 2022-03-17 RX ORDER — OXYCODONE HYDROCHLORIDE AND ACETAMINOPHEN 5; 325 MG/1; MG/1
1 TABLET ORAL EVERY 4 HOURS PRN
Qty: 50 TABLET | Refills: 0 | Status: SHIPPED | OUTPATIENT
Start: 2022-03-17 | End: 2022-04-19 | Stop reason: SDUPTHER

## 2022-03-22 ENCOUNTER — OFFICE VISIT (OUTPATIENT)
Dept: FAMILY MEDICINE CLINIC | Facility: CLINIC | Age: 62
End: 2022-03-22
Payer: COMMERCIAL

## 2022-03-22 ENCOUNTER — APPOINTMENT (OUTPATIENT)
Dept: LAB | Facility: HOSPITAL | Age: 62
End: 2022-03-22
Payer: COMMERCIAL

## 2022-03-22 VITALS
SYSTOLIC BLOOD PRESSURE: 140 MMHG | HEIGHT: 70 IN | TEMPERATURE: 98.1 F | DIASTOLIC BLOOD PRESSURE: 92 MMHG | OXYGEN SATURATION: 97 % | HEART RATE: 84 BPM | WEIGHT: 273 LBS | RESPIRATION RATE: 16 BRPM | BODY MASS INDEX: 39.08 KG/M2

## 2022-03-22 DIAGNOSIS — G25.81 RLS (RESTLESS LEGS SYNDROME): ICD-10-CM

## 2022-03-22 DIAGNOSIS — M10.9 GOUT, UNSPECIFIED CAUSE, UNSPECIFIED CHRONICITY, UNSPECIFIED SITE: ICD-10-CM

## 2022-03-22 DIAGNOSIS — M25.551 HIP PAIN, RIGHT: ICD-10-CM

## 2022-03-22 DIAGNOSIS — Z72.0 TOBACCO USE: ICD-10-CM

## 2022-03-22 DIAGNOSIS — K21.9 GASTROESOPHAGEAL REFLUX DISEASE, UNSPECIFIED WHETHER ESOPHAGITIS PRESENT: ICD-10-CM

## 2022-03-22 DIAGNOSIS — E78.5 DYSLIPIDEMIA: ICD-10-CM

## 2022-03-22 DIAGNOSIS — E66.09 CLASS 2 OBESITY DUE TO EXCESS CALORIES WITHOUT SERIOUS COMORBIDITY WITH BODY MASS INDEX (BMI) OF 39.0 TO 39.9 IN ADULT: ICD-10-CM

## 2022-03-22 DIAGNOSIS — I10 HYPERTENSION, UNSPECIFIED TYPE: ICD-10-CM

## 2022-03-22 DIAGNOSIS — M51.36 DDD (DEGENERATIVE DISC DISEASE), LUMBAR: ICD-10-CM

## 2022-03-22 DIAGNOSIS — I10 PRIMARY HYPERTENSION: Primary | ICD-10-CM

## 2022-03-22 PROBLEM — E66.812 CLASS 2 OBESITY DUE TO EXCESS CALORIES WITHOUT SERIOUS COMORBIDITY WITH BODY MASS INDEX (BMI) OF 39.0 TO 39.9 IN ADULT: Status: ACTIVE | Noted: 2022-02-24

## 2022-03-22 LAB
ALBUMIN SERPL BCP-MCNC: 4 G/DL (ref 3.5–5)
ALP SERPL-CCNC: 73 U/L (ref 46–116)
ALT SERPL W P-5'-P-CCNC: 30 U/L (ref 12–78)
ANION GAP SERPL CALCULATED.3IONS-SCNC: 8 MMOL/L (ref 4–13)
AST SERPL W P-5'-P-CCNC: 24 U/L (ref 5–45)
BASOPHILS # BLD AUTO: 0.09 THOUSANDS/ΜL (ref 0–0.1)
BASOPHILS NFR BLD AUTO: 1 % (ref 0–1)
BILIRUB SERPL-MCNC: 0.73 MG/DL (ref 0.2–1)
BUN SERPL-MCNC: 16 MG/DL (ref 5–25)
CALCIUM SERPL-MCNC: 8.9 MG/DL (ref 8.3–10.1)
CHLORIDE SERPL-SCNC: 107 MMOL/L (ref 100–108)
CHOLEST SERPL-MCNC: 170 MG/DL
CO2 SERPL-SCNC: 27 MMOL/L (ref 21–32)
CREAT SERPL-MCNC: 1.01 MG/DL (ref 0.6–1.3)
EOSINOPHIL # BLD AUTO: 0.42 THOUSAND/ΜL (ref 0–0.61)
EOSINOPHIL NFR BLD AUTO: 6 % (ref 0–6)
ERYTHROCYTE [DISTWIDTH] IN BLOOD BY AUTOMATED COUNT: 13.2 % (ref 11.6–15.1)
EST. AVERAGE GLUCOSE BLD GHB EST-MCNC: 117 MG/DL
GFR SERPL CREATININE-BSD FRML MDRD: 81 ML/MIN/1.73SQ M
GLUCOSE P FAST SERPL-MCNC: 96 MG/DL (ref 65–99)
HBA1C MFR BLD: 5.7 %
HCT VFR BLD AUTO: 43.6 % (ref 36.5–49.3)
HDLC SERPL-MCNC: 41 MG/DL
HGB BLD-MCNC: 14.8 G/DL (ref 12–17)
IMM GRANULOCYTES # BLD AUTO: 0.01 THOUSAND/UL (ref 0–0.2)
IMM GRANULOCYTES NFR BLD AUTO: 0 % (ref 0–2)
LDLC SERPL CALC-MCNC: 111 MG/DL (ref 0–100)
LYMPHOCYTES # BLD AUTO: 2.03 THOUSANDS/ΜL (ref 0.6–4.47)
LYMPHOCYTES NFR BLD AUTO: 30 % (ref 14–44)
MCH RBC QN AUTO: 30.5 PG (ref 26.8–34.3)
MCHC RBC AUTO-ENTMCNC: 33.9 G/DL (ref 31.4–37.4)
MCV RBC AUTO: 90 FL (ref 82–98)
MONOCYTES # BLD AUTO: 0.78 THOUSAND/ΜL (ref 0.17–1.22)
MONOCYTES NFR BLD AUTO: 12 % (ref 4–12)
NEUTROPHILS # BLD AUTO: 3.37 THOUSANDS/ΜL (ref 1.85–7.62)
NEUTS SEG NFR BLD AUTO: 51 % (ref 43–75)
NONHDLC SERPL-MCNC: 129 MG/DL
NRBC BLD AUTO-RTO: 0 /100 WBCS
PLATELET # BLD AUTO: 285 THOUSANDS/UL (ref 149–390)
PMV BLD AUTO: 9.3 FL (ref 8.9–12.7)
POTASSIUM SERPL-SCNC: 4.3 MMOL/L (ref 3.5–5.3)
PROT SERPL-MCNC: 7.3 G/DL (ref 6.4–8.2)
RBC # BLD AUTO: 4.85 MILLION/UL (ref 3.88–5.62)
SODIUM SERPL-SCNC: 142 MMOL/L (ref 136–145)
TRIGL SERPL-MCNC: 91 MG/DL
TSH SERPL DL<=0.05 MIU/L-ACNC: 3.12 UIU/ML (ref 0.36–3.74)
URATE SERPL-MCNC: 8.5 MG/DL (ref 4.2–8)
WBC # BLD AUTO: 6.7 THOUSAND/UL (ref 4.31–10.16)

## 2022-03-22 PROCEDURE — 3008F BODY MASS INDEX DOCD: CPT | Performed by: FAMILY MEDICINE

## 2022-03-22 PROCEDURE — 84550 ASSAY OF BLOOD/URIC ACID: CPT

## 2022-03-22 PROCEDURE — 83036 HEMOGLOBIN GLYCOSYLATED A1C: CPT

## 2022-03-22 PROCEDURE — 4004F PT TOBACCO SCREEN RCVD TLK: CPT | Performed by: FAMILY MEDICINE

## 2022-03-22 PROCEDURE — 36415 COLL VENOUS BLD VENIPUNCTURE: CPT

## 2022-03-22 PROCEDURE — 80061 LIPID PANEL: CPT

## 2022-03-22 PROCEDURE — 99214 OFFICE O/P EST MOD 30 MIN: CPT | Performed by: FAMILY MEDICINE

## 2022-03-22 PROCEDURE — 80053 COMPREHEN METABOLIC PANEL: CPT

## 2022-03-22 PROCEDURE — 84443 ASSAY THYROID STIM HORMONE: CPT

## 2022-03-22 PROCEDURE — 85025 COMPLETE CBC W/AUTO DIFF WBC: CPT

## 2022-03-22 RX ORDER — PREDNISONE 10 MG/1
TABLET ORAL
Qty: 30 TABLET | Refills: 0 | Status: SHIPPED | OUTPATIENT
Start: 2022-03-22 | End: 2022-05-03

## 2022-03-22 RX ORDER — LISINOPRIL 20 MG/1
20 TABLET ORAL DAILY
Qty: 30 TABLET | Refills: 5 | Status: SHIPPED | OUTPATIENT
Start: 2022-03-22

## 2022-03-22 NOTE — PROGRESS NOTES
BMI Counseling: Body mass index is 39 17 kg/m²  The BMI is above normal  Nutrition recommendations include decreasing portion sizes and moderation in carbohydrate intake  Exercise recommendations include exercising 3-5 times per week  No pharmacotherapy was ordered  Rationale for BMI follow-up plan is due to patient being overweight or obese  Assessment/Plan:    Return visit in 6 weeks     Problem List Items Addressed This Visit        Digestive    Acid reflux     Continue Prilosec 40 mg daily            Cardiovascular and Mediastinum    Hypertension - Primary    Relevant Medications    lisinopril (ZESTRIL) 20 mg tablet       Musculoskeletal and Integument    DDD (degenerative disc disease), lumbar     Continue Percocet 5 mg q h s  Other    Dyslipidemia    Gout     Continue colchicine 0 6 mg daily and Indocin as needed         Relevant Orders    Uric acid    RLS (restless legs syndrome)    Tobacco use    Class 2 obesity due to excess calories without serious comorbidity with body mass index (BMI) of 39 0 to 39 9 in adult    Hip pain, right    Relevant Medications    predniSONE 10 mg tablet    Other Relevant Orders    XR hip/pelv 2-3 vws right if performed            Subjective:      Patient ID: Shawn Porras is a 63 y o  male  Patient comes in for checkup  He recently started on lisinopril for high blood pressure  He complains of right hip pain  He has known lumbar disc disease  The following portions of the patient's history were reviewed and updated as appropriate:   Past Medical History:  He has a past medical history of Gout ,  _______________________________________________________________________  Medical Problems:  does not have any pertinent problems on file ,  _______________________________________________________________________  Past Surgical History:   has a past surgical history that includes Cataract extraction; Knee surgery;  Rotator cuff repair; and Back surgery  ,  _______________________________________________________________________  Family History:  family history includes Esophageal cancer in his father; Lung cancer in his father; Pancreatic cancer in his father; Stroke in his family; Thyroid nodules in his father ,  _______________________________________________________________________  Social History:   reports that he has been smoking cigars  He has never used smokeless tobacco  He reports current alcohol use  He reports that he does not use drugs  ,  _______________________________________________________________________  Allergies:  has No Known Allergies     _______________________________________________________________________  Current Outpatient Medications   Medication Sig Dispense Refill    colchicine (COLCRYS) 0 6 mg tablet TAKE 1 TABLET TWICE A DAY AND EVERY 3 HOURS AS NEEDED FOR GOUT AS DIRECTED 200 tablet 3    indomethacin (INDOCIN) 50 mg capsule TAKE ONE CAPSULE BY MOUTH THREE TIMES DAILY WITH MEALS 30 capsule 0    levocetirizine (XYZAL) 5 MG tablet Take 5 mg by mouth every evening       lisinopril (ZESTRIL) 20 mg tablet Take 1 tablet (20 mg total) by mouth daily 30 tablet 5    Melatonin 1 MG CAPS Take 1 mg by mouth in the morning      omeprazole (PriLOSEC) 40 MG capsule TAKE 1 CAPSULE DAILY 90 capsule 3    oxyCODONE-acetaminophen (PERCOCET) 5-325 mg per tablet Take 1 tablet by mouth every 4 (four) hours as needed for moderate pain Max Daily Amount: 6 tablets 50 tablet 0    rOPINIRole (REQUIP) 1 mg tablet TAKE 1 TABLET DAILY AT BEDTIME 90 tablet 3    predniSONE 10 mg tablet 4 daily x3, 3 daily x3, 2 daily x3, 1 daily x3 30 tablet 0     Current Facility-Administered Medications   Medication Dose Route Frequency Provider Last Rate Last Admin    cyanocobalamin injection 1,000 mcg  1,000 mcg Intramuscular Q30 Days Ozzie Rashid MD   1,000 mcg at 05/11/18 1643     _______________________________________________________________________  Review of Systems   Constitutional: Negative  Respiratory: Negative  Cardiovascular: Negative  Musculoskeletal: Positive for arthralgias, back pain and gait problem  Objective:  Vitals:    03/22/22 0807   BP: 140/92   Pulse: 84   Resp: 16   Temp: 98 1 °F (36 7 °C)   SpO2: 97%   Weight: 124 kg (273 lb)   Height: 5' 10" (1 778 m)     Body mass index is 39 17 kg/m²  Physical Exam  Constitutional:       Appearance: He is well-developed  He is obese  HENT:      Head: Normocephalic and atraumatic  Eyes:      Pupils: Pupils are equal, round, and reactive to light  Cardiovascular:      Rate and Rhythm: Normal rate and regular rhythm  Heart sounds: Normal heart sounds  Pulmonary:      Effort: Pulmonary effort is normal       Breath sounds: Normal breath sounds  Abdominal:      General: Bowel sounds are normal       Palpations: Abdomen is soft  Musculoskeletal:      Cervical back: Neck supple  Comments: Nontender right hip  No back tenderness  Straight leg raising is negative bilaterally  Limited abduction and adduction of right hip  He ambulates with a limping gait  Skin:     General: Skin is warm and dry  Neurological:      Mental Status: He is alert  Psychiatric:         Mood and Affect: Mood normal          Behavior: Behavior normal          Thought Content:  Thought content normal

## 2022-04-06 ENCOUNTER — TELEPHONE (OUTPATIENT)
Dept: FAMILY MEDICINE CLINIC | Facility: CLINIC | Age: 62
End: 2022-04-06

## 2022-04-06 DIAGNOSIS — R19.5 POSITIVE COLORECTAL CANCER SCREENING USING COLOGUARD TEST: Primary | ICD-10-CM

## 2022-04-06 LAB — COLOGUARD RESULT REPORTABLE: POSITIVE

## 2022-04-07 DIAGNOSIS — M25.50 ARTHRALGIA, UNSPECIFIED JOINT: ICD-10-CM

## 2022-04-07 RX ORDER — INDOMETHACIN 50 MG/1
CAPSULE ORAL
Qty: 30 CAPSULE | Refills: 0 | Status: SHIPPED | OUTPATIENT
Start: 2022-04-07 | End: 2022-05-27 | Stop reason: SDUPTHER

## 2022-04-15 DIAGNOSIS — G89.4 CHRONIC PAIN SYNDROME: ICD-10-CM

## 2022-04-18 RX ORDER — OXYCODONE HYDROCHLORIDE AND ACETAMINOPHEN 5; 325 MG/1; MG/1
1 TABLET ORAL EVERY 4 HOURS PRN
Qty: 50 TABLET | Refills: 0 | OUTPATIENT
Start: 2022-04-18

## 2022-04-19 DIAGNOSIS — G89.4 CHRONIC PAIN SYNDROME: ICD-10-CM

## 2022-04-19 RX ORDER — OXYCODONE HYDROCHLORIDE AND ACETAMINOPHEN 5; 325 MG/1; MG/1
1 TABLET ORAL EVERY 4 HOURS PRN
Qty: 50 TABLET | Refills: 0 | Status: SHIPPED | OUTPATIENT
Start: 2022-04-19 | End: 2022-07-15 | Stop reason: SDUPTHER

## 2022-04-19 RX ORDER — OXYCODONE HYDROCHLORIDE AND ACETAMINOPHEN 5; 325 MG/1; MG/1
1 TABLET ORAL EVERY 4 HOURS PRN
Qty: 50 TABLET | Refills: 0 | Status: SHIPPED | OUTPATIENT
Start: 2022-04-19 | End: 2022-04-19 | Stop reason: SDUPTHER

## 2022-04-19 NOTE — TELEPHONE ENCOUNTER
Pt's wife called asking about refill for Oxycodone, sent request several days ago and has not gotten a response        Please send refill if appropriate

## 2022-04-26 ENCOUNTER — RA CDI HCC (OUTPATIENT)
Dept: OTHER | Facility: HOSPITAL | Age: 62
End: 2022-04-26

## 2022-04-26 NOTE — PROGRESS NOTES
[facility]  coding opportunities          Chart Reviewed number of suggestions sent to Provider: 1     Patients Insurance        Commercial Insurance: [id number]     Please review the following Dx as per the active problem list:    E66 01 Morbid Obesity     Please review/recertify the BPA diagnoses for 2022      If this is correct, please assess and document during your next visit, May 3

## 2022-05-02 ENCOUNTER — HOSPITAL ENCOUNTER (OUTPATIENT)
Dept: RADIOLOGY | Facility: HOSPITAL | Age: 62
Discharge: HOME | End: 2022-05-02
Payer: COMMERCIAL

## 2022-05-02 DIAGNOSIS — M25.551 HIP PAIN, RIGHT: ICD-10-CM

## 2022-05-02 PROCEDURE — 73502 X-RAY EXAM HIP UNI 2-3 VIEWS: CPT

## 2022-05-03 ENCOUNTER — OFFICE VISIT (OUTPATIENT)
Dept: FAMILY MEDICINE CLINIC | Facility: CLINIC | Age: 62
End: 2022-05-03
Payer: COMMERCIAL

## 2022-05-03 VITALS
HEIGHT: 70 IN | SYSTOLIC BLOOD PRESSURE: 130 MMHG | TEMPERATURE: 97.7 F | HEART RATE: 88 BPM | BODY MASS INDEX: 37.8 KG/M2 | DIASTOLIC BLOOD PRESSURE: 92 MMHG | WEIGHT: 264 LBS | OXYGEN SATURATION: 95 %

## 2022-05-03 DIAGNOSIS — K21.9 GASTROESOPHAGEAL REFLUX DISEASE, UNSPECIFIED WHETHER ESOPHAGITIS PRESENT: ICD-10-CM

## 2022-05-03 DIAGNOSIS — E66.09 CLASS 2 OBESITY DUE TO EXCESS CALORIES WITHOUT SERIOUS COMORBIDITY WITH BODY MASS INDEX (BMI) OF 37.0 TO 37.9 IN ADULT: ICD-10-CM

## 2022-05-03 DIAGNOSIS — M10.9 GOUT, UNSPECIFIED CAUSE, UNSPECIFIED CHRONICITY, UNSPECIFIED SITE: ICD-10-CM

## 2022-05-03 DIAGNOSIS — M25.551 HIP PAIN, RIGHT: ICD-10-CM

## 2022-05-03 DIAGNOSIS — M51.36 DDD (DEGENERATIVE DISC DISEASE), LUMBAR: ICD-10-CM

## 2022-05-03 DIAGNOSIS — G89.4 CHRONIC PAIN SYNDROME: ICD-10-CM

## 2022-05-03 DIAGNOSIS — F11.20 CONTINUOUS OPIOID DEPENDENCE (HCC): ICD-10-CM

## 2022-05-03 DIAGNOSIS — Z00.00 HEALTH MAINTENANCE EXAMINATION: Primary | ICD-10-CM

## 2022-05-03 DIAGNOSIS — I10 PRIMARY HYPERTENSION: ICD-10-CM

## 2022-05-03 PROCEDURE — 99396 PREV VISIT EST AGE 40-64: CPT | Performed by: FAMILY MEDICINE

## 2022-05-03 RX ORDER — OXYCODONE HYDROCHLORIDE 10 MG/1
10 TABLET ORAL EVERY 4 HOURS PRN
Qty: 50 TABLET | Refills: 0 | Status: SHIPPED | OUTPATIENT
Start: 2022-05-03 | End: 2022-05-24 | Stop reason: SDUPTHER

## 2022-05-03 NOTE — PROGRESS NOTES
Assessment/Plan:    Return visit in 3 months     Problem List Items Addressed This Visit        Digestive    Acid reflux     Continue omeprazole 40 mg daily            Cardiovascular and Mediastinum    Hypertension     Increase lisinopril to 20 mg daily  Musculoskeletal and Integument    DDD (degenerative disc disease), lumbar    Relevant Orders    Ambulatory Referral to Pain Management       Other    Gout     Continue colchicine 0 6 mg b i d  Health maintenance examination - Primary    Class 2 obesity due to excess calories without serious comorbidity with body mass index (BMI) of 37 0 to 37 9 in adult    Hip pain, right    Relevant Orders    Ambulatory Referral to Pain Management    Continuous opioid dependence (HCC)    Chronic pain syndrome    Relevant Medications    oxyCODONE (ROXICODONE) 10 MG TABS            Subjective:      Patient ID: Adriana Gaston is a 63 y o  male  Patient comes in for checkup  He complains of persistent right hip pain since last visit  He got no relief with a prednisone taper  At times the pain goes down his right leg  He has known lumbar degenerative disc disease  He did not increase lisinopril to 20 mg as recommended last visit  He had x-ray of his right hip done yesterday  We do not have the results  The following portions of the patient's history were reviewed and updated as appropriate:   Past Medical History:  He has a past medical history of Gout ,  _______________________________________________________________________  Medical Problems:  does not have any pertinent problems on file ,  _______________________________________________________________________  Past Surgical History:   has a past surgical history that includes Cataract extraction; Knee surgery; Rotator cuff repair; and Back surgery  ,  _______________________________________________________________________  Family History:  family history includes Esophageal cancer in his father; Lung cancer in his father; Pancreatic cancer in his father; Stroke in his family; Thyroid nodules in his father ,  _______________________________________________________________________  Social History:   reports that he has been smoking cigars  He has never used smokeless tobacco  He reports current alcohol use  He reports that he does not use drugs  ,  _______________________________________________________________________  Allergies:  has No Known Allergies     _______________________________________________________________________  Current Outpatient Medications   Medication Sig Dispense Refill    colchicine (COLCRYS) 0 6 mg tablet TAKE 1 TABLET TWICE A DAY AND EVERY 3 HOURS AS NEEDED FOR GOUT AS DIRECTED 200 tablet 3    indomethacin (INDOCIN) 50 mg capsule TAKE ONE CAPSULE BY MOUTH THREE TIMES DAILY WITH MEALS 30 capsule 0    levocetirizine (XYZAL) 5 MG tablet Take 5 mg by mouth every evening       lisinopril (ZESTRIL) 20 mg tablet Take 1 tablet (20 mg total) by mouth daily 30 tablet 5    Melatonin 1 MG CAPS Take 1 mg by mouth in the morning      omeprazole (PriLOSEC) 40 MG capsule TAKE 1 CAPSULE DAILY 90 capsule 3    oxyCODONE-acetaminophen (PERCOCET) 5-325 mg per tablet Take 1 tablet by mouth every 4 (four) hours as needed for moderate pain Max Daily Amount: 6 tablets 50 tablet 0    rOPINIRole (REQUIP) 1 mg tablet TAKE 1 TABLET DAILY AT BEDTIME 90 tablet 3    oxyCODONE (ROXICODONE) 10 MG TABS Take 1 tablet (10 mg total) by mouth every 4 (four) hours as needed for moderate pain Max Daily Amount: 60 mg 50 tablet 0     Current Facility-Administered Medications   Medication Dose Route Frequency Provider Last Rate Last Admin    cyanocobalamin injection 1,000 mcg  1,000 mcg Intramuscular Q30 Days Ulices Cunningham MD   1,000 mcg at 05/11/18 1643     _______________________________________________________________________  Review of Systems   Constitutional: Negative  Respiratory: Negative      Musculoskeletal: Positive for arthralgias and back pain  Objective:  Vitals:    05/03/22 1520   BP: 130/92   BP Location: Left arm   Patient Position: Sitting   Cuff Size: Standard   Pulse: 88   Temp: 97 7 °F (36 5 °C)   TempSrc: Tympanic   SpO2: 95%   Weight: 120 kg (264 lb)   Height: 5' 10" (1 778 m)     Body mass index is 37 88 kg/m²  Physical Exam  Constitutional:       Appearance: He is well-developed  He is obese  HENT:      Head: Normocephalic and atraumatic  Eyes:      Pupils: Pupils are equal, round, and reactive to light  Cardiovascular:      Rate and Rhythm: Normal rate and regular rhythm  Heart sounds: Normal heart sounds  Pulmonary:      Effort: Pulmonary effort is normal       Breath sounds: Normal breath sounds  Abdominal:      General: Bowel sounds are normal       Palpations: Abdomen is soft  Musculoskeletal:      Cervical back: Neck supple  Comments: Right hip is nontender  There is decreased range of motion  He has limited abduction  Straight leg raising is negative bilaterally  Skin:     General: Skin is warm and dry  Neurological:      Mental Status: He is alert and oriented to person, place, and time  Psychiatric:         Mood and Affect: Mood normal          Behavior: Behavior normal          Thought Content:  Thought content normal

## 2022-05-05 ENCOUNTER — OFFICE VISIT (OUTPATIENT)
Dept: GASTROENTEROLOGY | Facility: CLINIC | Age: 62
End: 2022-05-05
Payer: COMMERCIAL

## 2022-05-05 VITALS
DIASTOLIC BLOOD PRESSURE: 62 MMHG | SYSTOLIC BLOOD PRESSURE: 110 MMHG | BODY MASS INDEX: 35.62 KG/M2 | HEART RATE: 78 BPM | WEIGHT: 263 LBS | OXYGEN SATURATION: 95 % | HEIGHT: 72 IN

## 2022-05-05 DIAGNOSIS — R19.5 POSITIVE COLORECTAL CANCER SCREENING USING COLOGUARD TEST: Primary | ICD-10-CM

## 2022-05-05 PROCEDURE — 3008F BODY MASS INDEX DOCD: CPT | Performed by: STUDENT IN AN ORGANIZED HEALTH CARE EDUCATION/TRAINING PROGRAM

## 2022-05-05 PROCEDURE — 99203 OFFICE O/P NEW LOW 30 MIN: CPT | Performed by: PHYSICIAN ASSISTANT

## 2022-05-05 NOTE — PATIENT INSTRUCTIONS
Colonoscopy   AMBULATORY CARE:   What you need to know about a colonoscopy:  A colonoscopy is a procedure to examine the inside of your colon (intestine) with a scope  A scope is a flexible tube with a small light and camera on the end  Polyps or tissue growths may be removed during your colonoscopy  What you need to do the week before your colonoscopy:  Give your healthcare provider a list of all the medicines, supplements, and herbs you take  You will need to stop taking medicines that contain aspirin or iron for 7 days before your colonoscopy  If you take a blood thinner, such as warfarin, ask when you should stop taking it  Make plans for someone to drive you home after your procedure  How to prepare for your colonoscopy: Your healthcare provider will have you prepare your bowels before your procedure  It is important for your bowels to be empty before your procedure to allow him or her to see your colon clearly  You will need to do the following:  · Have only clear liquids for the entire day before your colonoscopy  Clear liquids include plain gelatin, unsweetened fruit juices, clear soup, and broth  Do not drink any liquid that is blue, red, or purple  · Follow your bowel prep as directed  There are many different preparations that can be given before a colonoscopy  With any bowel prep, stay close to the bathroom  This prep will cause your bowels to move often  · Use an enema if directed  Your healthcare provider may tell you to use an enema to help clean out your bowels  · Do not eat or drink anything after midnight  This will help prevent problems that can happen if you vomit while under anesthesia  What will happen during your colonoscopy:   · You will be given medicine to help you relax  You will lie on your left side and raise one or both knees toward your chest  Your healthcare provider will examine your anus and use a gloved finger to check your rectum   You may need another enema if your bowel is not empty  The scope will be lubricated and gently placed into your anus  It will then be passed through your rectum and into your colon  Water or air will be put into your colon to help clean or expand it  This is done so your healthcare provider can see your colon clearly  · Tissue samples may be taken from the walls of your bowel and sent to a lab for tests  If you have a polyp, your healthcare provider will pass a wire loop through the scope and use it to hold the polyp  The polyp is then removed from the wall of your colon  You should not feel this  The polyps are sent to a lab for tests  Pictures of your colon may be taken during the procedure  What will happen after your colonoscopy: You may feel bloated or have some gas and abdominal discomfort  You may need to lie on your left side with a heating pad on your abdomen  Eat small meals until your bloating has improved  Risks of a colonoscopy: You may have pain or bleeding after the scope or polyps are removed  You may also have a slow heartbeat, decreased blood pressure, or increased sweating  Your colon may tear due to the increased pressure from the scope and other instruments  This may cause bowel contents to leak out of your colon and into your abdomen  If this happens, you will need to have surgery on your colon  Seek care immediately if:   · You have a large amount of bright red blood in your bowel movements  · Your abdomen is hard and firm and you have severe pain  · You have sudden trouble breathing  Call your doctor if:   · You develop a rash or hives  · You have a fever within 24 hours of your procedure  · You have not had a bowel movement for 3 days after your procedure  · You have questions or concerns about your condition or care  After your colonoscopy:   · Do not lift, strain, or run  until your healthcare provider says it is okay  · Rest as much as possible    You have been given medicine to relax you  Do not  drive or make important decisions for at least 24 hours  Return to your normal activity as directed  · Relieve gas and discomfort from bloating  by lying on your left side with a heating pad on your abdomen  You may need to take short walks to help the gas move out  Eat small meals until bloating is relieved  If you had polyps removed: For 7 days after your procedure:  · Do not  take aspirin  · Do not  go on long car rides  Help prevent constipation:   · Eat a variety of healthy foods  Healthy foods include fruit, vegetables, whole-grain breads, low-fat dairy products, beans, lean meat, and fish  Ask if you need to be on a special diet  Your healthcare provider may recommend that you eat high-fiber foods such as cooked beans  Fiber helps you have regular bowel movements  · Drink liquids as directed  Adults should drink between 9 and 13 eight-ounce cups of liquid every day  Ask what amount is best for you  For most people, good liquids to drink are water, juice, and milk  · Exercise as directed  Talk to your healthcare provider about the best exercise plan for you  Exercise can help prevent constipation, decrease your blood pressure and improve your health  Follow up with your doctor as directed:  Write down your questions so you remember to ask them during your visits  © Copyright [employer] 2022 Information is for End User's use only and may not be sold, redistributed or otherwise used for commercial purposes  All illustrations and images included in CareNotes® are the copyrighted property of A D A M , Inc  or [facility] [place] [facility] [place]   The above information is an  only  It is not intended as medical advice for individual conditions or treatments  Talk to your doctor, nurse or pharmacist before following any medical regimen to see if it is safe and effective for you      Scheduled date of colonoscopy (as of today):6/16/22  Physician performing colonoscopy:Hermilo  Location of colonoscopy:[place]  Bowel prep reviewed with patient:miralax/dulcolax  Instructions reviewed with patient by:Lila FERREIRA  Clearances: none

## 2022-05-05 NOTE — PROGRESS NOTES
[facility] - Outpatient Consultation  Phi Salcido 63 y o  male MRN: [id number]  Encounter: [id number]          ASSESSMENT AND PLAN:      1  Positive colorectal cancer screening using Cologuard test  -Will plan colonoscopy at this time  ______________________________________________________________________    HPI:   70-year-old male who presents with a past medical history of hypertension, degenerative disc disease, and hyperlipidemia for colon consultation  Patient reports his last colonoscopy was when he was 50  Patient reports he most recently had a positive Cologuard  Patient denies any abdominal pain  Patient denies any diarrhea or constipation  Patient denies any nausea vomiting  Patient denies any melena or rectal bleeding  REVIEW OF SYSTEMS:    CONSTITUTIONAL: Denies any fever, chills, rigors, and weight loss  HEENT: No earache or tinnitus  Denies hearing loss or visual disturbances  CARDIOVASCULAR: No chest pain or palpitations  RESPIRATORY: Denies any cough, hemoptysis, shortness of breath or dyspnea on exertion  GASTROINTESTINAL: As noted in the History of Present Illness  GENITOURINARY: No problems with urination  Denies any hematuria or dysuria  NEUROLOGIC: No dizziness or vertigo, denies headaches  MUSCULOSKELETAL: Denies any muscle or joint pain  SKIN: Denies skin rashes or itching  ENDOCRINE: Denies excessive thirst  Denies intolerance to heat or cold  PSYCHOSOCIAL: Denies depression or anxiety  Denies any recent memory loss         Historical Information   Past Medical History:   Diagnosis Date    Gout      Past Surgical History:   Procedure Laterality Date    BACK SURGERY      Discs    CATARACT EXTRACTION      KNEE SURGERY      ROTATOR CUFF REPAIR       Social History   Social History     Substance and Sexual Activity   Alcohol Use Yes     Social History     Substance and Sexual Activity   Drug Use No     Social History     Tobacco Use Smoking Status Current Some Day Smoker    Types: Cigars   Smokeless Tobacco Never Used     Family History   Problem Relation Age of Onset    Esophageal cancer Father     Lung cancer Father     Pancreatic cancer Father     Thyroid nodules Father     Stroke Family        Meds/Allergies       Current Outpatient Medications:     colchicine (COLCRYS) 0 6 mg tablet    indomethacin (INDOCIN) 50 mg capsule    levocetirizine (XYZAL) 5 MG tablet    lisinopril (ZESTRIL) 20 mg tablet    Melatonin 1 MG CAPS    omeprazole (PriLOSEC) 40 MG capsule    oxyCODONE (ROXICODONE) 10 MG TABS    oxyCODONE-acetaminophen (PERCOCET) 5-325 mg per tablet    rOPINIRole (REQUIP) 1 mg tablet    Current Facility-Administered Medications:     cyanocobalamin injection 1,000 mcg, 1,000 mcg, Intramuscular, Q30 Days, 1,000 mcg at 05/11/18 1643    No Known Allergies        Objective     Blood pressure 110/62, pulse 78, height 6' (1 829 m), weight 119 kg (263 lb), SpO2 95 %  Body mass index is 35 67 kg/m²  PHYSICAL EXAM:      General Appearance:   Alert, cooperative, no distress   HEENT:   Normocephalic, atraumatic, anicteric      Neck:  Supple, symmetrical, trachea midline   Lungs:   Clear to auscultation bilaterally; no rales, rhonchi or wheezing; respirations unlabored    Heart[de-identified]   Regular rate and rhythm; no murmur, rub, or gallop  Abdomen:   Soft, non-tender, non-distended; normal bowel sounds; no masses, no organomegaly    Genitalia:   Deferred    Rectal:   Deferred    Extremities:  No cyanosis, clubbing or edema    Pulses:  2+ and symmetric    Skin:  No jaundice, rashes, or lesions    Lymph nodes:  No palpable cervical lymphadenopathy        Lab Results:   No visits with results within 1 Day(s) from this visit  Latest known visit with results is:   Appointment on 03/22/2022   Component Date Value    TSH 3RD GENERATON 03/22/2022 3  124     Sodium 03/22/2022 142     Potassium 03/22/2022 4 3     Chloride 03/22/2022 107  CO2 03/22/2022 27     ANION GAP 03/22/2022 8     BUN 03/22/2022 16     Creatinine 03/22/2022 1 01     Glucose, Fasting 03/22/2022 96     Calcium 03/22/2022 8 9     AST 03/22/2022 24     ALT 03/22/2022 30     Alkaline Phosphatase 03/22/2022 73     Total Protein 03/22/2022 7 3     Albumin 03/22/2022 4 0     Total Bilirubin 03/22/2022 0 73     eGFR 03/22/2022 81     WBC 03/22/2022 6 70     RBC 03/22/2022 4 85     Hemoglobin 03/22/2022 14 8     Hematocrit 03/22/2022 43 6     MCV 03/22/2022 90     MCH 03/22/2022 30 5     MCHC 03/22/2022 33 9     RDW 03/22/2022 13 2     MPV 03/22/2022 9 3     Platelets 41/33/4177 285     nRBC 03/22/2022 0     Neutrophils Relative 03/22/2022 51     Immat GRANS % 03/22/2022 0     Lymphocytes Relative 03/22/2022 30     Monocytes Relative 03/22/2022 12     Eosinophils Relative 03/22/2022 6     Basophils Relative 03/22/2022 1     Neutrophils Absolute 03/22/2022 3 37     Immature Grans Absolute 03/22/2022 0 01     Lymphocytes Absolute 03/22/2022 2 03     Monocytes Absolute 03/22/2022 0 78     Eosinophils Absolute 03/22/2022 0 42     Basophils Absolute 03/22/2022 0 09     Hemoglobin A1C 03/22/2022 5 7*    EAG 03/22/2022 117     Cholesterol 03/22/2022 170     Triglycerides 03/22/2022 91     HDL, Direct 03/22/2022 41     LDL Calculated 03/22/2022 111*    Non-HDL-Chol (CHOL-HDL) 03/22/2022 129     Uric Acid 03/22/2022 8 5*         Radiology Results:   No results found

## 2022-05-05 NOTE — LETTER
May 5, 2022     Kimberly Leach MD  [facility]  [address]  [place]    Patient: Nia Connell   YOB: 1960   Date of Visit: 5/5/2022       Dear Dr Nate Bird: Thank you for referring Lili Kuhn to me for evaluation  Below are my notes for this consultation  If you have questions, please do not hesitate to call me  I look forward to following your patient along with you  Sincerely,        Amber Avalos PA-C        CC: No Recipients  Merrilyn Rocher, Army Scheuermann  5/5/2022 10:18 AM  Sign when Signing Visit  [facility] - Outpatient Consultation  Nia Connell 63 y o  male MRN: [id number]  Encounter: [id number]          ASSESSMENT AND PLAN:      1  Positive colorectal cancer screening using Cologuard test  -Will plan colonoscopy at this time  ______________________________________________________________________    HPI:   59-year-old male who presents with a past medical history of hypertension, degenerative disc disease, and hyperlipidemia for colon consultation  Patient reports his last colonoscopy was when he was 50  Patient reports he most recently had a positive Cologuard  Patient denies any abdominal pain  Patient denies any diarrhea or constipation  Patient denies any nausea vomiting  Patient denies any melena or rectal bleeding  REVIEW OF SYSTEMS:    CONSTITUTIONAL: Denies any fever, chills, rigors, and weight loss  HEENT: No earache or tinnitus  Denies hearing loss or visual disturbances  CARDIOVASCULAR: No chest pain or palpitations  RESPIRATORY: Denies any cough, hemoptysis, shortness of breath or dyspnea on exertion  GASTROINTESTINAL: As noted in the History of Present Illness  GENITOURINARY: No problems with urination  Denies any hematuria or dysuria  NEUROLOGIC: No dizziness or vertigo, denies headaches  MUSCULOSKELETAL: Denies any muscle or joint pain  SKIN: Denies skin rashes or itching     ENDOCRINE: Denies excessive thirst  Denies intolerance to heat or cold  PSYCHOSOCIAL: Denies depression or anxiety  Denies any recent memory loss  Historical Information   Past Medical History:   Diagnosis Date    Gout      Past Surgical History:   Procedure Laterality Date    BACK SURGERY      Discs    CATARACT EXTRACTION      KNEE SURGERY      ROTATOR CUFF REPAIR       Social History   Social History     Substance and Sexual Activity   Alcohol Use Yes     Social History     Substance and Sexual Activity   Drug Use No     Social History     Tobacco Use   Smoking Status Current Some Day Smoker    Types: Cigars   Smokeless Tobacco Never Used     Family History   Problem Relation Age of Onset    Esophageal cancer Father     Lung cancer Father     Pancreatic cancer Father     Thyroid nodules Father     Stroke Family        Meds/Allergies       Current Outpatient Medications:     colchicine (COLCRYS) 0 6 mg tablet    indomethacin (INDOCIN) 50 mg capsule    levocetirizine (XYZAL) 5 MG tablet    lisinopril (ZESTRIL) 20 mg tablet    Melatonin 1 MG CAPS    omeprazole (PriLOSEC) 40 MG capsule    oxyCODONE (ROXICODONE) 10 MG TABS    oxyCODONE-acetaminophen (PERCOCET) 5-325 mg per tablet    rOPINIRole (REQUIP) 1 mg tablet    Current Facility-Administered Medications:     cyanocobalamin injection 1,000 mcg, 1,000 mcg, Intramuscular, Q30 Days, 1,000 mcg at 05/11/18 1643    No Known Allergies        Objective     Blood pressure 110/62, pulse 78, height 6' (1 829 m), weight 119 kg (263 lb), SpO2 95 %  Body mass index is 35 67 kg/m²  PHYSICAL EXAM:      General Appearance:   Alert, cooperative, no distress   HEENT:   Normocephalic, atraumatic, anicteric      Neck:  Supple, symmetrical, trachea midline   Lungs:   Clear to auscultation bilaterally; no rales, rhonchi or wheezing; respirations unlabored    Heart[de-identified]   Regular rate and rhythm; no murmur, rub, or gallop     Abdomen:   Soft, non-tender, non-distended; normal bowel sounds; no masses, no organomegaly    Genitalia:   Deferred    Rectal:   Deferred    Extremities:  No cyanosis, clubbing or edema    Pulses:  2+ and symmetric    Skin:  No jaundice, rashes, or lesions    Lymph nodes:  No palpable cervical lymphadenopathy        Lab Results:   No visits with results within 1 Day(s) from this visit  Latest known visit with results is:   Appointment on 03/22/2022   Component Date Value    TSH 3RD GENERATON 03/22/2022 3  124     Sodium 03/22/2022 142     Potassium 03/22/2022 4 3     Chloride 03/22/2022 107     CO2 03/22/2022 27     ANION GAP 03/22/2022 8     BUN 03/22/2022 16     Creatinine 03/22/2022 1 01     Glucose, Fasting 03/22/2022 96     Calcium 03/22/2022 8 9     AST 03/22/2022 24     ALT 03/22/2022 30     Alkaline Phosphatase 03/22/2022 73     Total Protein 03/22/2022 7 3     Albumin 03/22/2022 4 0     Total Bilirubin 03/22/2022 0 73     eGFR 03/22/2022 81     WBC 03/22/2022 6 70     RBC 03/22/2022 4 85     Hemoglobin 03/22/2022 14 8     Hematocrit 03/22/2022 43 6     MCV 03/22/2022 90     MCH 03/22/2022 30 5     MCHC 03/22/2022 33 9     RDW 03/22/2022 13 2     MPV 03/22/2022 9 3     Platelets 30/62/2269 285     nRBC 03/22/2022 0     Neutrophils Relative 03/22/2022 51     Immat GRANS % 03/22/2022 0     Lymphocytes Relative 03/22/2022 30     Monocytes Relative 03/22/2022 12     Eosinophils Relative 03/22/2022 6     Basophils Relative 03/22/2022 1     Neutrophils Absolute 03/22/2022 3 37     Immature Grans Absolute 03/22/2022 0 01     Lymphocytes Absolute 03/22/2022 2 03     Monocytes Absolute 03/22/2022 0 78     Eosinophils Absolute 03/22/2022 0 42     Basophils Absolute 03/22/2022 0 09     Hemoglobin A1C 03/22/2022 5 7*    EAG 03/22/2022 117     Cholesterol 03/22/2022 170     Triglycerides 03/22/2022 91     HDL, Direct 03/22/2022 41     LDL Calculated 03/22/2022 111*    Non-HDL-Chol (CHOL-HDL) 03/22/2022 129     Uric Acid 03/22/2022 8 5* Radiology Results:   No results found

## 2022-05-19 ENCOUNTER — TELEPHONE (OUTPATIENT)
Dept: GASTROENTEROLOGY | Facility: CLINIC | Age: 62
End: 2022-05-19

## 2022-05-19 NOTE — H&P (VIEW-ONLY)
Assessment:  1  Primary osteoarthritis of right hip    2  DDD (degenerative disc disease), lumbar    3  Hip pain, right        Plan:  Orders Placed This Encounter   Procedures    FL spine and pain procedure     Standing Status:   Future     Standing Expiration Date:   5/20/2026     Order Specific Question:   Reason for Exam:     Answer:   right intraarticular hip injection- depo 80     Order Specific Question:   Anticoagulant hold needed? Answer:   Saskia Dalton Ambulatory Referral to Orthopedic Surgery     Standing Status:   Future     Standing Expiration Date:   5/20/2023     Referral Priority:   Routine     Referral Type:   Consult - AMB     Referral Reason:   Specialty Services Required     Referred to Provider:   Trinity Herzog MD     Requested Specialty:   Orthopedic Surgery     Number of Visits Requested:   1     Expiration Date:   5/20/2023       No orders of the defined types were placed in this encounter  My impressions and treatment recommendations were discussed in detail with the patient, who verbalized understanding and had no further questions  This is a 72-year-old male who presents to our office with right lower extremity pain secondary to osteoarthritis of the right hip  He has right hip pain with log-rolling and Stinchfield test as well as external rotation  Discussed intra-articular hip injection under fluoroscopic guidance which e is agreeable to  Will also have him referred to Orthopedic surgery to discuss elective joint replacement  [place] Prescription Drug Monitoring Program report was reviewed and was appropriate     Complete risks and benefits including bleeding, infection, tissue reaction, nerve injury and allergic reaction were discussed  The approach was demonstrated using models and literature was provided  Verbal and written consent was obtained  Discharge instructions were provided   I personally saw and examined the patient and I agree with the above discussed plan of care  History of Present Illness:    Mayank Hinds is a 63 y o  male who presents to [facility] for initial evaluation of the above stated pain complaints  The patient has a past medical and chronic pain history as outlined in the assessment section  He was referred by Mindi Childers MD  [address] [place] [address] chief complaint is right hip pain ankle  This is a chronic issue for last 6 months  Patient works as a logistic technician  Over the past month, intensity the pain has been severe  Current pain is 10/10, constant, worse in the evening and night  It is described as sharp, throbbing, dull/aching and is in the right lateral hip in down the lateral right thigh up to the knee  Increased with lying down, standing, bending, sitting, walking  Has recent x-ray showing advanced arthritis of the right hip  Also has history of hemilaminectomy from L2-L4  Past medical history includes arthritis of the hip, shoulder, knee  No relief with heat/ice therapy  Does not smoke tobacco marijuana  Drinks alcohol 1 to 2 times a week  Not on blood thinners  Not allergic to latex or contrast dye  He continues on oxycodone through his primary care provider  In the past has use lidocaine patch  Currently also using naproxen  In the past also been on oral steroids        Review of Systems:    Review of Systems      Patient Active Problem List   Diagnosis    Dyslipidemia    Elevated rheumatoid factor    Gout    Tendinitis of right shoulder    Allergic rhinitis    Acid reflux    Fatigue    Tendonitis of shoulder, left    RLS (restless legs syndrome)    Nocturia    Health maintenance examination    Tobacco use    Lumbar radiculopathy    DDD (degenerative disc disease), lumbar    Hammer toe of second toe of right foot    Class 2 obesity due to excess calories without serious comorbidity with body mass index (BMI) of 37 0 to 37 9 in adult    Hypertension    Hip pain, right    Continuous opioid dependence (HCC)    Chronic pain syndrome       Past Medical History:   Diagnosis Date    Arthritis     Gout        Past Surgical History:   Procedure Laterality Date    BACK SURGERY      Discs    CATARACT EXTRACTION      KNEE SURGERY      ROTATOR CUFF REPAIR         Family History   Problem Relation Age of Onset    Esophageal cancer Father     Lung cancer Father     Pancreatic cancer Father     Thyroid nodules Father     Stroke Family        Social History     Occupational History     Comment: employed   Tobacco Use    Smoking status: Current Some Day Smoker     Types: Cigars    Smokeless tobacco: Never Used   Vaping Use    Vaping Use: Never used   Substance and Sexual Activity    Alcohol use:  Yes    Drug use: No    Sexual activity: Not on file         Current Outpatient Medications:     colchicine (COLCRYS) 0 6 mg tablet, TAKE 1 TABLET TWICE A DAY AND EVERY 3 HOURS AS NEEDED FOR GOUT AS DIRECTED, Disp: 200 tablet, Rfl: 3    indomethacin (INDOCIN) 50 mg capsule, TAKE ONE CAPSULE BY MOUTH THREE TIMES DAILY WITH MEALS (Patient taking differently: PRN), Disp: 30 capsule, Rfl: 0    levocetirizine (XYZAL) 5 MG tablet, Take 5 mg by mouth every evening , Disp: , Rfl:     lisinopril (ZESTRIL) 20 mg tablet, Take 1 tablet (20 mg total) by mouth daily, Disp: 30 tablet, Rfl: 5    Melatonin 1 MG CAPS, Take 1 mg by mouth in the morning, Disp: , Rfl:     omeprazole (PriLOSEC) 40 MG capsule, TAKE 1 CAPSULE DAILY, Disp: 90 capsule, Rfl: 3    oxyCODONE (ROXICODONE) 10 MG TABS, Take 1 tablet (10 mg total) by mouth every 4 (four) hours as needed for moderate pain Max Daily Amount: 60 mg, Disp: 50 tablet, Rfl: 0    rOPINIRole (REQUIP) 1 mg tablet, TAKE 1 TABLET DAILY AT BEDTIME, Disp: 90 tablet, Rfl: 3    oxyCODONE-acetaminophen (PERCOCET) 5-325 mg per tablet, Take 1 tablet by mouth every 4 (four) hours as needed for moderate pain Max Daily Amount: 6 tablets (Patient not taking: No sig reported), Disp: 50 tablet, Rfl: 0    Current Facility-Administered Medications:     cyanocobalamin injection 1,000 mcg, 1,000 mcg, Intramuscular, Q30 Days, Roly Horn MD, 1,000 mcg at 05/11/18 1643    No Known Allergies    Physical Exam:    /77 (BP Location: Right arm, Patient Position: Sitting, Cuff Size: Standard)   Pulse 81   Wt 118 kg (259 lb 3 2 oz)   BMI 35 15 kg/m²     Constitutional: normal, well developed, well nourished, alert, in no distress and non-toxic and no overt pain behavior  Eyes: anicteric  HEENT: grossly intact  Neck: supple, symmetric, trachea midline and no masses   Pulmonary:even and unlabored  Cardiovascular:No edema or pitting edema present  Skin:Normal without rashes or lesions and well hydrated  Psychiatric:Mood and affect appropriate  Neurologic:  5/5 strength in the bilateral lower extremities in all myotomes  Musculoskeletal:  Log-rolling of the right lower extremity induces right groin pain  Stinchfield test positive on the right  External rotation causes notable pain in the right hip  Imaging    RIGHT HIP   5/2/22     INDICATION:   M25 551: Pain in right hip      COMPARISON:  None     VIEWS:  XR HIP/PELV 2-3 VWS RIGHT W PELVIS IF PERFORMED         FINDINGS:     There is no acute fracture or dislocation      Severe right hip osteoarthritis is seen      No lytic or blastic osseous lesion      Soft tissues are unremarkable      Degenerative changes visualized lower lumbar spine      IMPRESSION:     No acute osseous abnormality    FL spine and pain procedure    (Results Pending)       Orders Placed This Encounter   Procedures    FL spine and pain procedure    Ambulatory Referral to Orthopedic Surgery

## 2022-05-19 NOTE — PROGRESS NOTES
Assessment:  1  Primary osteoarthritis of right hip    2  DDD (degenerative disc disease), lumbar    3  Hip pain, right        Plan:  Orders Placed This Encounter   Procedures    FL spine and pain procedure     Standing Status:   Future     Standing Expiration Date:   5/20/2026     Order Specific Question:   Reason for Exam:     Answer:   right intraarticular hip injection- depo 80     Order Specific Question:   Anticoagulant hold needed? Answer:   Wilfred Feliciano Ambulatory Referral to Orthopedic Surgery     Standing Status:   Future     Standing Expiration Date:   5/20/2023     Referral Priority:   Routine     Referral Type:   Consult - AMB     Referral Reason:   Specialty Services Required     Referred to Provider:   Cecily Hooper MD     Requested Specialty:   Orthopedic Surgery     Number of Visits Requested:   1     Expiration Date:   5/20/2023       No orders of the defined types were placed in this encounter  My impressions and treatment recommendations were discussed in detail with the patient, who verbalized understanding and had no further questions  This is a 81-year-old male who presents to our office with right lower extremity pain secondary to osteoarthritis of the right hip  He has right hip pain with log-rolling and Stinchfield test as well as external rotation  Discussed intra-articular hip injection under fluoroscopic guidance which e is agreeable to  Will also have him referred to Orthopedic surgery to discuss elective joint replacement  [place] Prescription Drug Monitoring Program report was reviewed and was appropriate     Complete risks and benefits including bleeding, infection, tissue reaction, nerve injury and allergic reaction were discussed  The approach was demonstrated using models and literature was provided  Verbal and written consent was obtained  Discharge instructions were provided   I personally saw and examined the patient and I agree with the above discussed plan of care  History of Present Illness:    Jovana Meredith is a 63 y o  male who presents to [facility] for initial evaluation of the above stated pain complaints  The patient has a past medical and chronic pain history as outlined in the assessment section  He was referred by MD Shahab Gipson   [facility]  chief complaint is right hip pain ankle  This is a chronic issue for last 6 months  Patient works as a logistic technician  Over the past month, intensity the pain has been severe  Current pain is 10/10, constant, worse in the evening and night  It is described as sharp, throbbing, dull/aching and is in the right lateral hip in down the lateral right thigh up to the knee  Increased with lying down, standing, bending, sitting, walking  Has recent x-ray showing advanced arthritis of the right hip  Also has history of hemilaminectomy from L2-L4  Past medical history includes arthritis of the hip, shoulder, knee  No relief with heat/ice therapy  Does not smoke tobacco marijuana  Drinks alcohol 1 to 2 times a week  Not on blood thinners  Not allergic to latex or contrast dye  He continues on oxycodone through his primary care provider  In the past has use lidocaine patch  Currently also using naproxen  In the past also been on oral steroids        Review of Systems:    Review of Systems      Patient Active Problem List   Diagnosis    Dyslipidemia    Elevated rheumatoid factor    Gout    Tendinitis of right shoulder    Allergic rhinitis    Acid reflux    Fatigue    Tendonitis of shoulder, left    RLS (restless legs syndrome)    Nocturia    Health maintenance examination    Tobacco use    Lumbar radiculopathy    DDD (degenerative disc disease), lumbar    Hammer toe of second toe of right foot    Class 2 obesity due to excess calories without serious comorbidity with body mass index (BMI) of 37 0 to 37 9 in adult    Hypertension    Hip pain, right    Continuous opioid dependence (HCC)    Chronic pain syndrome       Past Medical History:   Diagnosis Date    Arthritis     Gout        Past Surgical History:   Procedure Laterality Date    BACK SURGERY      Discs    CATARACT EXTRACTION      KNEE SURGERY      ROTATOR CUFF REPAIR         Family History   Problem Relation Age of Onset    Esophageal cancer Father     Lung cancer Father     Pancreatic cancer Father     Thyroid nodules Father     Stroke Family        Social History     Occupational History     Comment: employed   Tobacco Use    Smoking status: Current Some Day Smoker     Types: Cigars    Smokeless tobacco: Never Used   Vaping Use    Vaping Use: Never used   Substance and Sexual Activity    Alcohol use:  Yes    Drug use: No    Sexual activity: Not on file         Current Outpatient Medications:     colchicine (COLCRYS) 0 6 mg tablet, TAKE 1 TABLET TWICE A DAY AND EVERY 3 HOURS AS NEEDED FOR GOUT AS DIRECTED, Disp: 200 tablet, Rfl: 3    indomethacin (INDOCIN) 50 mg capsule, TAKE ONE CAPSULE BY MOUTH THREE TIMES DAILY WITH MEALS (Patient taking differently: PRN), Disp: 30 capsule, Rfl: 0    levocetirizine (XYZAL) 5 MG tablet, Take 5 mg by mouth every evening , Disp: , Rfl:     lisinopril (ZESTRIL) 20 mg tablet, Take 1 tablet (20 mg total) by mouth daily, Disp: 30 tablet, Rfl: 5    Melatonin 1 MG CAPS, Take 1 mg by mouth in the morning, Disp: , Rfl:     omeprazole (PriLOSEC) 40 MG capsule, TAKE 1 CAPSULE DAILY, Disp: 90 capsule, Rfl: 3    oxyCODONE (ROXICODONE) 10 MG TABS, Take 1 tablet (10 mg total) by mouth every 4 (four) hours as needed for moderate pain Max Daily Amount: 60 mg, Disp: 50 tablet, Rfl: 0    rOPINIRole (REQUIP) 1 mg tablet, TAKE 1 TABLET DAILY AT BEDTIME, Disp: 90 tablet, Rfl: 3    oxyCODONE-acetaminophen (PERCOCET) 5-325 mg per tablet, Take 1 tablet by mouth every 4 (four) hours as needed for moderate pain Max Daily Amount: 6 tablets (Patient not taking: No sig reported), Disp: 50 tablet, Rfl: 0    Current Facility-Administered Medications:     cyanocobalamin injection 1,000 mcg, 1,000 mcg, Intramuscular, Q30 Days, Kezia Coleman MD, 1,000 mcg at 05/11/18 1643    No Known Allergies    Physical Exam:    /77 (BP Location: Right arm, Patient Position: Sitting, Cuff Size: Standard)   Pulse 81   Wt 118 kg (259 lb 3 2 oz)   BMI 35 15 kg/m²     Constitutional: normal, well developed, well nourished, alert, in no distress and non-toxic and no overt pain behavior  Eyes: anicteric  HEENT: grossly intact  Neck: supple, symmetric, trachea midline and no masses   Pulmonary:even and unlabored  Cardiovascular:No edema or pitting edema present  Skin:Normal without rashes or lesions and well hydrated  Psychiatric:Mood and affect appropriate  Neurologic:  5/5 strength in the bilateral lower extremities in all myotomes  Musculoskeletal:  Log-rolling of the right lower extremity induces right groin pain  Stinchfield test positive on the right  External rotation causes notable pain in the right hip  Imaging    RIGHT HIP   5/2/22     INDICATION:   M25 551: Pain in right hip      COMPARISON:  None     VIEWS:  XR HIP/PELV 2-3 VWS RIGHT W PELVIS IF PERFORMED         FINDINGS:     There is no acute fracture or dislocation      Severe right hip osteoarthritis is seen      No lytic or blastic osseous lesion      Soft tissues are unremarkable      Degenerative changes visualized lower lumbar spine      IMPRESSION:     No acute osseous abnormality    FL spine and pain procedure    (Results Pending)       Orders Placed This Encounter   Procedures    FL spine and pain procedure    Ambulatory Referral to Orthopedic Surgery

## 2022-05-20 ENCOUNTER — CONSULT (OUTPATIENT)
Dept: PAIN MEDICINE | Facility: CLINIC | Age: 62
End: 2022-05-20
Payer: COMMERCIAL

## 2022-05-20 VITALS
DIASTOLIC BLOOD PRESSURE: 77 MMHG | WEIGHT: 259.2 LBS | HEART RATE: 81 BPM | BODY MASS INDEX: 35.15 KG/M2 | SYSTOLIC BLOOD PRESSURE: 118 MMHG

## 2022-05-20 DIAGNOSIS — M51.36 DDD (DEGENERATIVE DISC DISEASE), LUMBAR: ICD-10-CM

## 2022-05-20 DIAGNOSIS — M16.11 PRIMARY OSTEOARTHRITIS OF RIGHT HIP: Primary | ICD-10-CM

## 2022-05-20 DIAGNOSIS — M25.551 HIP PAIN, RIGHT: ICD-10-CM

## 2022-05-20 PROCEDURE — 4004F PT TOBACCO SCREEN RCVD TLK: CPT | Performed by: STUDENT IN AN ORGANIZED HEALTH CARE EDUCATION/TRAINING PROGRAM

## 2022-05-20 PROCEDURE — 99204 OFFICE O/P NEW MOD 45 MIN: CPT | Performed by: STUDENT IN AN ORGANIZED HEALTH CARE EDUCATION/TRAINING PROGRAM

## 2022-05-24 DIAGNOSIS — G89.4 CHRONIC PAIN SYNDROME: ICD-10-CM

## 2022-05-25 RX ORDER — OXYCODONE HYDROCHLORIDE 10 MG/1
10 TABLET ORAL EVERY 4 HOURS PRN
Qty: 50 TABLET | Refills: 0 | Status: SHIPPED | OUTPATIENT
Start: 2022-05-25 | End: 2022-06-23 | Stop reason: SDUPTHER

## 2022-05-27 DIAGNOSIS — M25.50 ARTHRALGIA, UNSPECIFIED JOINT: ICD-10-CM

## 2022-05-28 RX ORDER — INDOMETHACIN 50 MG/1
50 CAPSULE ORAL
Qty: 30 CAPSULE | Refills: 0 | Status: SHIPPED | OUTPATIENT
Start: 2022-05-28

## 2022-05-31 ENCOUNTER — HOSPITAL ENCOUNTER (OUTPATIENT)
Dept: RADIOLOGY | Facility: CLINIC | Age: 62
Discharge: HOME | End: 2022-05-31
Attending: STUDENT IN AN ORGANIZED HEALTH CARE EDUCATION/TRAINING PROGRAM
Payer: COMMERCIAL

## 2022-05-31 VITALS
HEART RATE: 82 BPM | DIASTOLIC BLOOD PRESSURE: 76 MMHG | TEMPERATURE: 97.6 F | OXYGEN SATURATION: 95 % | SYSTOLIC BLOOD PRESSURE: 125 MMHG | RESPIRATION RATE: 20 BRPM

## 2022-05-31 DIAGNOSIS — M16.11 PRIMARY OSTEOARTHRITIS OF RIGHT HIP: ICD-10-CM

## 2022-05-31 PROCEDURE — 77002 NEEDLE LOCALIZATION BY XRAY: CPT | Performed by: STUDENT IN AN ORGANIZED HEALTH CARE EDUCATION/TRAINING PROGRAM

## 2022-05-31 PROCEDURE — A9579 GAD-BASE MR CONTRAST NOS,1ML: HCPCS | Performed by: STUDENT IN AN ORGANIZED HEALTH CARE EDUCATION/TRAINING PROGRAM

## 2022-05-31 PROCEDURE — 20610 DRAIN/INJ JOINT/BURSA W/O US: CPT | Performed by: STUDENT IN AN ORGANIZED HEALTH CARE EDUCATION/TRAINING PROGRAM

## 2022-05-31 PROCEDURE — 77002 NEEDLE LOCALIZATION BY XRAY: CPT

## 2022-05-31 RX ORDER — METHYLPREDNISOLONE ACETATE 80 MG/ML
80 INJECTION, SUSPENSION INTRA-ARTICULAR; INTRALESIONAL; INTRAMUSCULAR; PARENTERAL; SOFT TISSUE ONCE
Status: COMPLETED | OUTPATIENT
Start: 2022-05-31 | End: 2022-05-31

## 2022-05-31 RX ORDER — BUPIVACAINE HCL/PF 2.5 MG/ML
4 VIAL (ML) INJECTION ONCE
Status: COMPLETED | OUTPATIENT
Start: 2022-05-31 | End: 2022-05-31

## 2022-05-31 RX ADMIN — Medication 4 ML: at 14:19

## 2022-05-31 RX ADMIN — METHYLPREDNISOLONE ACETATE 80 MG: 80 INJECTION, SUSPENSION INTRA-ARTICULAR; INTRALESIONAL; INTRAMUSCULAR; PARENTERAL; SOFT TISSUE at 14:19

## 2022-05-31 RX ADMIN — GADOPENTETATE DIMEGLUMINE 1 ML: 469.01 INJECTION INTRAVENOUS at 14:18

## 2022-05-31 NOTE — DISCHARGE INSTR - LAB
Steroid Joint Injection   WHAT YOU NEED TO KNOW:   A steroid joint injection is a procedure to inject steroid medicine into a joint  Steroid medicine decreases pain and inflammation  The injection may also contain an anesthetic (numbing medicine) to decrease pain  It may be done to treat conditions such as arthritis, gout, or carpal tunnel syndrome  The injections may be given in your knee, ankle, shoulder, elbow, wrist, ankle or sacroiliac joint  Do not apply heat to any area that is numb  If you have discomfort or soreness at the injection site, you may apply ice today, 20 minutes on and 20 minutes off  Tomorrow you may use ice or warm, moist heat  Do not apply ice or heat directly to the skin  You may have an increase or change in the discomfort for 36-48 hours after your treatment  Apply ice and continue with any pain medicine you have been prescribed  Do not do anything strenuous today  You may shower, but no tub baths or hot tubs today  You may resume your normal activities tomorrow, but do not overdo it  Resume normal activities slowly when you are feeling better  If you experience redness, drainage or swelling at the injection site, or if you develop a fever above 100 degrees, please call [facility] at [phone] or go to the Emergency Room  Continue to take all routine medicines prescribed by your primary care physician unless otherwise instructed by our staff  Most blood thinners should be started again according to your regularly scheduled dosing  If you have any questions, please give our office a call  As no general anesthesia was used in today's procedure, you should not experience any side effects related to anesthesia  If you have a problem specifically related to your procedure, please call our office at [phone]  Problems not related to your procedure should be directed to your primary care physician

## 2022-05-31 NOTE — INTERVAL H&P NOTE
Update: (This section must be completed if the H&P was completed greater than 24 hrs to procedure or admission)    H&P reviewed  After examining the patient, I find no changed to the H&P since it had been written  Patient re-evaluated   Accept as history and physical     Tatyana Kirby MD/May 31, 2022/2:08 PM

## 2022-06-07 ENCOUNTER — TELEPHONE (OUTPATIENT)
Dept: PAIN MEDICINE | Facility: CLINIC | Age: 62
End: 2022-06-07

## 2022-06-15 ENCOUNTER — TELEPHONE (OUTPATIENT)
Dept: GASTROENTEROLOGY | Facility: CLINIC | Age: 62
End: 2022-06-15

## 2022-06-15 RX ORDER — SODIUM CHLORIDE, SODIUM LACTATE, POTASSIUM CHLORIDE, CALCIUM CHLORIDE 600; 310; 30; 20 MG/100ML; MG/100ML; MG/100ML; MG/100ML
125 INJECTION, SOLUTION INTRAVENOUS CONTINUOUS
Status: CANCELLED | OUTPATIENT
Start: 2022-06-15

## 2022-06-15 NOTE — TELEPHONE ENCOUNTER
Patient's wife called for list of products that he needed to purchase for his colonoscopy  Asked the wife to remind her  that he is on a liquid diet all day, no solids, and nothing red, blue or purple in color

## 2022-06-16 ENCOUNTER — HOSPITAL ENCOUNTER (OUTPATIENT)
Dept: GASTROENTEROLOGY | Facility: HOSPITAL | Age: 62
Setting detail: OUTPATIENT SURGERY
Discharge: HOME | End: 2022-06-16
Admitting: PHYSICIAN ASSISTANT
Payer: COMMERCIAL

## 2022-06-16 ENCOUNTER — ANESTHESIA (OUTPATIENT)
Dept: GASTROENTEROLOGY | Facility: HOSPITAL | Age: 62
End: 2022-06-16

## 2022-06-16 ENCOUNTER — ANESTHESIA EVENT (OUTPATIENT)
Dept: GASTROENTEROLOGY | Facility: HOSPITAL | Age: 62
End: 2022-06-16

## 2022-06-16 VITALS
SYSTOLIC BLOOD PRESSURE: 109 MMHG | OXYGEN SATURATION: 95 % | WEIGHT: 243.61 LBS | TEMPERATURE: 97.9 F | BODY MASS INDEX: 33 KG/M2 | DIASTOLIC BLOOD PRESSURE: 56 MMHG | HEIGHT: 72 IN | RESPIRATION RATE: 22 BRPM | HEART RATE: 58 BPM

## 2022-06-16 DIAGNOSIS — R19.5 POSITIVE COLORECTAL CANCER SCREENING USING COLOGUARD TEST: ICD-10-CM

## 2022-06-16 PROCEDURE — 45378 DIAGNOSTIC COLONOSCOPY: CPT | Performed by: INTERNAL MEDICINE

## 2022-06-16 RX ORDER — LIDOCAINE HYDROCHLORIDE 10 MG/ML
INJECTION, SOLUTION EPIDURAL; INFILTRATION; INTRACAUDAL; PERINEURAL AS NEEDED
Status: DISCONTINUED | OUTPATIENT
Start: 2022-06-16 | End: 2022-06-16

## 2022-06-16 RX ORDER — PROPOFOL 10 MG/ML
INJECTION, EMULSION INTRAVENOUS AS NEEDED
Status: DISCONTINUED | OUTPATIENT
Start: 2022-06-16 | End: 2022-06-16

## 2022-06-16 RX ORDER — SODIUM CHLORIDE, SODIUM LACTATE, POTASSIUM CHLORIDE, CALCIUM CHLORIDE 600; 310; 30; 20 MG/100ML; MG/100ML; MG/100ML; MG/100ML
125 INJECTION, SOLUTION INTRAVENOUS CONTINUOUS
Status: DISCONTINUED | OUTPATIENT
Start: 2022-06-16 | End: 2022-06-20 | Stop reason: HOSPADM

## 2022-06-16 RX ADMIN — PROPOFOL 30 MG: 10 INJECTION, EMULSION INTRAVENOUS at 07:56

## 2022-06-16 RX ADMIN — PROPOFOL 30 MG: 10 INJECTION, EMULSION INTRAVENOUS at 07:58

## 2022-06-16 RX ADMIN — LIDOCAINE HYDROCHLORIDE 20 MG: 10 INJECTION, SOLUTION EPIDURAL; INFILTRATION; INTRACAUDAL; PERINEURAL at 07:50

## 2022-06-16 RX ADMIN — SODIUM CHLORIDE, SODIUM LACTATE, POTASSIUM CHLORIDE, AND CALCIUM CHLORIDE 125 ML/HR: .6; .31; .03; .02 INJECTION, SOLUTION INTRAVENOUS at 07:05

## 2022-06-16 RX ADMIN — PROPOFOL 30 MG: 10 INJECTION, EMULSION INTRAVENOUS at 08:00

## 2022-06-16 RX ADMIN — PROPOFOL 30 MG: 10 INJECTION, EMULSION INTRAVENOUS at 07:54

## 2022-06-16 RX ADMIN — PROPOFOL 30 MG: 10 INJECTION, EMULSION INTRAVENOUS at 07:52

## 2022-06-16 RX ADMIN — PROPOFOL 150 MG: 10 INJECTION, EMULSION INTRAVENOUS at 07:50

## 2022-06-16 NOTE — ANESTHESIA PREPROCEDURE EVALUATION
Procedure:  COLONOSCOPY    Relevant Problems   CARDIO   (+) Hypertension      GI/HEPATIC   (+) Acid reflux      MUSCULOSKELETAL   (+) DDD (degenerative disc disease), lumbar   (+) Gout      NEURO/PSYCH   (+) Chronic pain syndrome   (+) Continuous opioid dependence (HCC)      Dyslipidemia   Elevated rheumatoid factor   Gout   Tendinitis of right shoulder   Allergic rhinitis   Acid reflux   Fatigue   Tendonitis of shoulder, left   RLS (restless legs syndrome)   Nocturia   Health maintenance examination   Tobacco use   Lumbar radiculopathy   DDD (degenerative disc disease), lumbar   Hammer toe of second toe of right foot   Class 2 obesity due to excess calories without serious comorbidity with body mass index (BMI) of 37 0 to 37 9 in adult   Hypertension   Hip pain, right   Continuous opioid dependence (HCC)   Chronic pain syndrome       Physical Exam    Airway    Mallampati score: II  TM Distance: >3 FB  Neck ROM: full     Dental       Cardiovascular  Cardiovascular exam normal    Pulmonary  Pulmonary exam normal     Other Findings        Anesthesia Plan  ASA Score- 2     Anesthesia Type- IV sedation with anesthesia with ASA Monitors  Additional Monitors:   Airway Plan:           Plan Factors-Exercise tolerance (METS): >4 METS  Chart reviewed  EKG reviewed  Imaging results reviewed  Existing labs reviewed  Patient summary reviewed  Patient is a current smoker  Patient instructed to abstain from smoking on day of procedure  Induction- intravenous  Postoperative Plan-     Informed Consent- Anesthetic plan and risks discussed with patient  I personally reviewed this patient with the CRNA  Discussed and agreed on the Anesthesia Plan with the CRNA  Jonnathan Antunez

## 2022-06-16 NOTE — ANESTHESIA POSTPROCEDURE EVALUATION
Post-Op Assessment Note    CV Status:  Stable  Pain Score: 0    Pain management: adequate     Mental Status:  Arousable and sleepy   Hydration Status:  Euvolemic   PONV Controlled:  Controlled   Airway Patency:  Patent      Post Op Vitals Reviewed: Yes      Staff: CRNA         No complications documented      BP   96/54   Temp     Pulse 78   Resp 18   SpO2 96% RA

## 2022-06-16 NOTE — H&P
History and Physical - [facility]  Domingo Berrios 63 y o  male MRN: [id number]      HPI: Domingo Berrios is a 63y o  year old male who presents for a positive cologuard       REVIEW OF SYSTEMS: Per the HPI, and otherwise unremarkable  Historical Information   Past Medical History:   Diagnosis Date    Arthritis     Gout     Hypertension      Past Surgical History:   Procedure Laterality Date    BACK SURGERY      Discs    CATARACT EXTRACTION      KNEE SURGERY      ROTATOR CUFF REPAIR       Social History   Social History     Substance and Sexual Activity   Alcohol Use Yes    Alcohol/week: 1 0 standard drink    Types: 1 Cans of beer per week     Social History     Substance and Sexual Activity   Drug Use No     Social History     Tobacco Use   Smoking Status Current Some Day Smoker    Types: Cigars   Smokeless Tobacco Never Used     Family History   Problem Relation Age of Onset    Esophageal cancer Father     Lung cancer Father     Pancreatic cancer Father     Thyroid nodules Father     Stroke Family        Meds/Allergies     (Not in a hospital admission)      No Known Allergies    Objective     Blood pressure 126/85, pulse 70, temperature (!) 97 3 °F (36 3 °C), temperature source Temporal, resp  rate 15, height 6' (1 829 m), weight 110 kg (243 lb 9 7 oz), SpO2 96 %  PHYSICAL EXAM    Gen: NAD  CV: RRR  CHEST: Clear  ABD: soft, NT/ND  EXT: no edema      ASSESSMENT/PLAN:  This is a 63y o  year old male here for colonoscopy, and he is stable and optimized for his procedure

## 2022-06-23 DIAGNOSIS — G89.4 CHRONIC PAIN SYNDROME: ICD-10-CM

## 2022-06-23 RX ORDER — OXYCODONE HYDROCHLORIDE 10 MG/1
10 TABLET ORAL EVERY 4 HOURS PRN
Qty: 50 TABLET | Refills: 0 | Status: SHIPPED | OUTPATIENT
Start: 2022-06-23 | End: 2022-07-15 | Stop reason: SDUPTHER

## 2022-07-05 NOTE — TELEPHONE ENCOUNTER
Patient's wife Bailey states pt would like to know when he's able to get his next repeat injection. Please reach out to her at # [phone]

## 2022-07-05 NOTE — TELEPHONE ENCOUNTER
S/w wife. States pt would like to repeat 5/31 hip injection. States it provided greater than 50% relief until the last week.  Pt declined to schedule ortho consult. States can not take time off work right now for a possible surgery  When can pt schedule repeat injection?

## 2022-07-14 DIAGNOSIS — K21.9 GASTROESOPHAGEAL REFLUX DISEASE: ICD-10-CM

## 2022-07-14 DIAGNOSIS — G89.4 CHRONIC PAIN SYNDROME: ICD-10-CM

## 2022-07-14 RX ORDER — OMEPRAZOLE 40 MG/1
CAPSULE, DELAYED RELEASE ORAL
Qty: 90 CAPSULE | Refills: 3 | Status: SHIPPED | OUTPATIENT
Start: 2022-07-14

## 2022-07-14 RX ORDER — OXYCODONE HYDROCHLORIDE AND ACETAMINOPHEN 5; 325 MG/1; MG/1
1 TABLET ORAL EVERY 4 HOURS PRN
Qty: 50 TABLET | Refills: 0 | Status: CANCELLED | OUTPATIENT
Start: 2022-07-14

## 2022-07-15 DIAGNOSIS — G89.4 CHRONIC PAIN SYNDROME: ICD-10-CM

## 2022-07-15 NOTE — TELEPHONE ENCOUNTER
Medication Refill Request     Name oxyCODONE (ROXICODONE) 10 MG TABS  Dose/Frequency Take 1 tablet (10 mg total) by mouth every 4 (four) hours as needed for moderate pain   Quantity 50 tablets  Verified pharmacy   [x]  Verified ordering Provider   [x]  Does patient have enough for the next 3 days?  Yes [] No [x]

## 2022-07-18 RX ORDER — OXYCODONE HYDROCHLORIDE AND ACETAMINOPHEN 5; 325 MG/1; MG/1
1 TABLET ORAL EVERY 4 HOURS PRN
Qty: 50 TABLET | Refills: 0 | Status: SHIPPED | OUTPATIENT
Start: 2022-07-18 | End: 2022-07-29 | Stop reason: ALTCHOICE

## 2022-07-18 RX ORDER — OXYCODONE HYDROCHLORIDE 10 MG/1
10 TABLET ORAL EVERY 4 HOURS PRN
Qty: 50 TABLET | Refills: 0 | Status: SHIPPED | OUTPATIENT
Start: 2022-07-18 | End: 2022-08-09 | Stop reason: SDUPTHER

## 2022-07-29 ENCOUNTER — OFFICE VISIT (OUTPATIENT)
Dept: OBGYN CLINIC | Facility: CLINIC | Age: 62
End: 2022-07-29
Payer: COMMERCIAL

## 2022-07-29 VITALS
SYSTOLIC BLOOD PRESSURE: 144 MMHG | DIASTOLIC BLOOD PRESSURE: 96 MMHG | OXYGEN SATURATION: 98 % | BODY MASS INDEX: 34 KG/M2 | HEART RATE: 96 BPM | WEIGHT: 251 LBS | HEIGHT: 72 IN | RESPIRATION RATE: 18 BRPM

## 2022-07-29 DIAGNOSIS — G89.29 CHRONIC PAIN OF RIGHT HIP: ICD-10-CM

## 2022-07-29 DIAGNOSIS — M25.551 CHRONIC PAIN OF RIGHT HIP: ICD-10-CM

## 2022-07-29 DIAGNOSIS — Z01.818 PREOPERATIVE TESTING: ICD-10-CM

## 2022-07-29 DIAGNOSIS — M16.11 PRIMARY OSTEOARTHRITIS OF ONE HIP, RIGHT: Primary | ICD-10-CM

## 2022-07-29 PROCEDURE — 99204 OFFICE O/P NEW MOD 45 MIN: CPT | Performed by: ORTHOPAEDIC SURGERY

## 2022-07-29 RX ORDER — FOLIC ACID 1 MG/1
1 TABLET ORAL DAILY
Qty: 30 TABLET | Refills: 1 | Status: SHIPPED | OUTPATIENT
Start: 2022-07-29

## 2022-07-29 RX ORDER — FERROUS SULFATE TAB EC 324 MG (65 MG FE EQUIVALENT) 324 (65 FE) MG
324 TABLET DELAYED RESPONSE ORAL
Qty: 60 TABLET | Refills: 1 | Status: SHIPPED | OUTPATIENT
Start: 2022-07-29

## 2022-07-29 RX ORDER — MULTIVIT-MIN/IRON FUM/FOLIC AC 7.5 MG-4
1 TABLET ORAL DAILY
Qty: 30 TABLET | Refills: 1 | Status: SHIPPED | OUTPATIENT
Start: 2022-07-29

## 2022-07-29 RX ORDER — CHLORHEXIDINE GLUCONATE 4 G/100ML
SOLUTION TOPICAL DAILY PRN
Status: CANCELLED | OUTPATIENT
Start: 2022-07-29

## 2022-07-29 RX ORDER — ASCORBIC ACID 500 MG
500 TABLET ORAL 2 TIMES DAILY
Qty: 60 TABLET | Refills: 1 | Status: SHIPPED | OUTPATIENT
Start: 2022-07-29

## 2022-07-29 RX ORDER — CHLORHEXIDINE GLUCONATE 0.12 MG/ML
15 RINSE ORAL ONCE
Status: CANCELLED | OUTPATIENT
Start: 2022-07-29 | End: 2022-07-29

## 2022-07-29 NOTE — PROGRESS NOTES
HPI:  Patient is a 63y o  year old  male  who presents with chief complaint of Hip Pain  Patient complains of right hip pain  Lashae Aguilar notes pain to the lateral aspect of his right hip  Pain has been severe for aprox  6 months  He denies any injury or trama  He notes pain will increase at night, with ambulation, when putting on shoes and socks or when getting in/out of a car  He underwent a FL guided right hip intra-articular CSI on 5/31/22 with Dr Venus Lima  He notes that the CSI was beneficial for aprox  2-3 weeks before wearing off  He has a hx of back surgery aprox  1 5 years ago  He is taking Aleve and Oxycodone for pain control         ROS:   General: No fever, no chills, no weight loss, no weight gain  HEENT:  No loss of hearing, no nose bleeds, no sore throat  Eyes:  No eye pain, no red eyes, no visual disturbance  Respiratory:  No cough, no shortness of breath, no wheezing  Cardiovascular:  No chest pain, no palpitations, no edema  GI: No abdominal pain, no nausea, no vomiting  Endocrine: No frequent urination, no excessive thirst  Urinary:  No dysuria, no hematuria, no incontinence  Musculoskeletal: see HPI and PE  Skin:  No rash, no wounds  Neurological:  No dizziness, no headache, no numbness  Psychiatric:  No difficulty concentrating, no depression, no suicide thoughts, no anxiety  Review of all other systems is negative    PMH:  Past Medical History:   Diagnosis Date    Arthritis     Gout     Hypertension        PSH:  Past Surgical History:   Procedure Laterality Date    BACK SURGERY      Discs    CATARACT EXTRACTION      KNEE SURGERY      ROTATOR CUFF REPAIR         Medications:  Current Outpatient Medications   Medication Sig Dispense Refill    ascorbic acid (VITAMIN C) 500 MG tablet Take 1 tablet (500 mg total) by mouth 2 (two) times a day 60 tablet 1    colchicine (COLCRYS) 0 6 mg tablet TAKE 1 TABLET TWICE A DAY AND EVERY 3 HOURS AS NEEDED FOR GOUT AS DIRECTED 200 tablet 3    ferrous sulfate 324 (65 Fe) mg Take 1 tablet (324 mg total) by mouth 2 (two) times a day before meals 60 tablet 1    folic acid (FOLVITE) 1 mg tablet Take 1 tablet (1 mg total) by mouth daily 30 tablet 1    indomethacin (INDOCIN) 50 mg capsule Take 1 capsule (50 mg total) by mouth 3 (three) times a day with meals (Patient taking differently: Take 50 mg by mouth 3 (three) times a day as needed) 30 capsule 0    levocetirizine (XYZAL) 5 MG tablet Take 5 mg by mouth every evening       lisinopril (ZESTRIL) 20 mg tablet Take 1 tablet (20 mg total) by mouth daily 30 tablet 5    Melatonin 1 MG CAPS Take 1 mg by mouth in the morning      Multiple Vitamins-Minerals (multivitamin with minerals) tablet Take 1 tablet by mouth daily 30 tablet 1    omeprazole (PriLOSEC) 40 MG capsule TAKE 1 CAPSULE DAILY 90 capsule 3    oxyCODONE (ROXICODONE) 10 MG TABS Take 1 tablet (10 mg total) by mouth every 4 (four) hours as needed for moderate pain Max Daily Amount: 60 mg 50 tablet 0    rOPINIRole (REQUIP) 1 mg tablet TAKE 1 TABLET DAILY AT BEDTIME 90 tablet 3     Current Facility-Administered Medications   Medication Dose Route Frequency Provider Last Rate Last Admin    cyanocobalamin injection 1,000 mcg  1,000 mcg Intramuscular Q30 Days Harvey Amaya MD   1,000 mcg at 05/11/18 1643       Allergies:  No Known Allergies    Family History:  Family History   Problem Relation Age of Onset    Esophageal cancer Father     Lung cancer Father     Pancreatic cancer Father     Thyroid nodules Father     Stroke Family        Social History:  Social History     Occupational History     Comment: employed   Tobacco Use    Smoking status: Current Some Day Smoker     Types: Cigars    Smokeless tobacco: Never Used   Vaping Use    Vaping Use: Never used   Substance and Sexual Activity    Alcohol use:  Yes     Alcohol/week: 1 0 standard drink     Types: 1 Cans of beer per week    Drug use: No    Sexual activity: Not on file       Physical Exam:  General : Alert, cooperative, no distress, appears stated age  Blood pressure 144/96, pulse 96, resp  rate 18, height 6' (1 829 m), weight 114 kg (251 lb), SpO2 98 %  Head:  Normocephalic, without obvious abnormality, atraumatic   Eyes:  Conjunctiva/corneas clear, EOM's intact,   Ears: Both ears normal appearance, no hearing deficits  Nose: Nares normal, septum midline, no drainage    Neck: Supple,  trachea midline, no adenopathy, no tenderness, no mass   Back:   Symmetric, no curvature, ROM normal, no tenderness   Lungs:   Respirations unlabored, clear    Chest Wall:  No tenderness or deformity   Extremities: Extremities normal, atraumatic, no cyanosis or edema      Pulses: 2+ and symmetric   Skin: Skin color, texture, turgor normal, no rashes or lesions      Neurologic: Normal   Heart: Normal rate and rhythm         Right Hip Exam     Range of Motion   Abduction: 30   Extension: 20   Right hip flexion: 85  Right hip external rotation: 15    Internal rotation: 5     Other   Erythema: absent  Scars: absent  Sensation: normal  Pulse: present    Comments:  Non tender to palpation over greater trochanteric bursa   Pain with all hip motions   Extremity appears warm and well perfused           Imaging Studies: The following imaging studies were reviewed in office today  My findings are noted  X-rays of the right hip demonstrate no acute fracture or dislocation  Joint space narrowing superiorly, severe arthritis  Assessment  Encounter Diagnoses   Name Primary?  Primary osteoarthritis of one hip, right Yes    Chronic pain of right hip     Preoperative testing          Plan:  Severe right hip osteoarthritis   · X-rays were reviewed   · Treatment options were discussed being oral NSAID's, Tylenol, PT, intra-articular hip CSI, or a RTHA  · Lashae Aguilar has tried conservative treatment options without significant improvement in symptoms   · Risks and benefits of a right total hip arthroplasty were discussed   Risks consist of but not limited to bleeding, infection, stiffness, pain, injury to surrounding structures, blood clots, PE, dislocation, fracture, etc    · Patient elected to proceed with a right total hip arthroplasty and informed surgical consent was signed   · He will be placed on a blood thinner following surgery to help prevent DVT   · Surgery is an overnight stay   · He works in a warehouse, 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

## 2022-08-03 ENCOUNTER — RA CDI HCC (OUTPATIENT)
Dept: OTHER | Facility: HOSPITAL | Age: 62
End: 2022-08-03

## 2022-08-03 NOTE — PROGRESS NOTES
[facility]  coding opportunities       Chart reviewed, no opportunity found: CHART REVIEWED, NO OPPORTUNITY FOUND        Patients Insurance        Commercial Insurance: [employer]

## 2022-08-09 ENCOUNTER — OFFICE VISIT (OUTPATIENT)
Dept: FAMILY MEDICINE CLINIC | Facility: CLINIC | Age: 62
End: 2022-08-09

## 2022-08-09 VITALS
OXYGEN SATURATION: 98 % | WEIGHT: 249.8 LBS | BODY MASS INDEX: 33.83 KG/M2 | HEIGHT: 72 IN | HEART RATE: 81 BPM | SYSTOLIC BLOOD PRESSURE: 132 MMHG | TEMPERATURE: 98.8 F | DIASTOLIC BLOOD PRESSURE: 80 MMHG

## 2022-08-09 DIAGNOSIS — K21.9 GASTROESOPHAGEAL REFLUX DISEASE, UNSPECIFIED WHETHER ESOPHAGITIS PRESENT: ICD-10-CM

## 2022-08-09 DIAGNOSIS — M25.551 HIP PAIN, RIGHT: ICD-10-CM

## 2022-08-09 DIAGNOSIS — G25.81 RLS (RESTLESS LEGS SYNDROME): ICD-10-CM

## 2022-08-09 DIAGNOSIS — I10 PRIMARY HYPERTENSION: Primary | ICD-10-CM

## 2022-08-09 DIAGNOSIS — G89.4 CHRONIC PAIN SYNDROME: ICD-10-CM

## 2022-08-09 DIAGNOSIS — M10.9 GOUT, UNSPECIFIED CAUSE, UNSPECIFIED CHRONICITY, UNSPECIFIED SITE: ICD-10-CM

## 2022-08-09 DIAGNOSIS — Z72.0 TOBACCO USE: ICD-10-CM

## 2022-08-09 RX ORDER — OXYCODONE HYDROCHLORIDE 10 MG/1
10 TABLET ORAL EVERY 4 HOURS PRN
Qty: 50 TABLET | Refills: 0 | Status: SHIPPED | OUTPATIENT
Start: 2022-08-09 | End: 2022-09-06 | Stop reason: SDUPTHER

## 2022-08-09 NOTE — PROGRESS NOTES
Assessment/Plan:    Return visit in October for preoperative clearance  Problem List Items Addressed This Visit        Digestive    Acid reflux     Continue Prilosec 40 mg daily            Cardiovascular and Mediastinum    Hypertension - Primary     Continue lisinopril 20 mg daily         Relevant Orders    POCT ECG (Completed)       Other    Gout     Continue colchicine 0 6 mg b i d          RLS (restless legs syndrome)     Continue ropinirole 1 mg q h s  Tobacco use    Hip pain, right    Chronic pain syndrome    Relevant Medications    oxyCODONE (ROXICODONE) 10 MG TABS            Subjective:      Patient ID: Janiya Garner is a 63 y o  male  Patient comes in for follow-up of his chronic pain  He is taking 1 or 2 10 mg oxycodone per day  This is mostly for his chronic low back pain however he has end-stage osteoarthritis of his hip and plans hip replacement in October  He feels this is worse than his chronic back pain  He uses his pain medication very sparingly and is aware of possibility of addiction and other side effects of chronic opioid use  The following portions of the patient's history were reviewed and updated as appropriate:   Past Medical History:  He has a past medical history of Arthritis, Gout, and Hypertension  ,  _______________________________________________________________________  Medical Problems:  does not have any pertinent problems on file ,  _______________________________________________________________________  Past Surgical History:   has a past surgical history that includes Cataract extraction; Knee surgery; Rotator cuff repair; and Back surgery  ,  _______________________________________________________________________  Family History:  family history includes Esophageal cancer in his father; Lung cancer in his father; Pancreatic cancer in his father; Stroke in his family;  Thyroid nodules in his father ,  _______________________________________________________________________  Social History:   reports that he has been smoking cigars  He has never used smokeless tobacco  He reports current alcohol use of about 1 0 standard drink of alcohol per week  He reports that he does not use drugs  ,  _______________________________________________________________________  Allergies:  has No Known Allergies     _______________________________________________________________________  Current Outpatient Medications   Medication Sig Dispense Refill    ascorbic acid (VITAMIN C) 500 MG tablet Take 1 tablet (500 mg total) by mouth 2 (two) times a day 60 tablet 1    colchicine (COLCRYS) 0 6 mg tablet TAKE 1 TABLET TWICE A DAY AND EVERY 3 HOURS AS NEEDED FOR GOUT AS DIRECTED 200 tablet 3    ferrous sulfate 324 (65 Fe) mg Take 1 tablet (324 mg total) by mouth 2 (two) times a day before meals 60 tablet 1    folic acid (FOLVITE) 1 mg tablet Take 1 tablet (1 mg total) by mouth daily 30 tablet 1    indomethacin (INDOCIN) 50 mg capsule Take 1 capsule (50 mg total) by mouth 3 (three) times a day with meals (Patient taking differently: Take 50 mg by mouth 3 (three) times a day as needed) 30 capsule 0    levocetirizine (XYZAL) 5 MG tablet Take 5 mg by mouth every evening       lisinopril (ZESTRIL) 20 mg tablet Take 1 tablet (20 mg total) by mouth daily 30 tablet 5    Melatonin 1 MG CAPS Take 1 mg by mouth in the morning      Multiple Vitamins-Minerals (multivitamin with minerals) tablet Take 1 tablet by mouth daily 30 tablet 1    omeprazole (PriLOSEC) 40 MG capsule TAKE 1 CAPSULE DAILY 90 capsule 3    oxyCODONE (ROXICODONE) 10 MG TABS Take 1 tablet (10 mg total) by mouth every 4 (four) hours as needed for moderate pain Max Daily Amount: 60 mg 50 tablet 0    rOPINIRole (REQUIP) 1 mg tablet TAKE 1 TABLET DAILY AT BEDTIME 90 tablet 3     Current Facility-Administered Medications   Medication Dose Route Frequency Provider Last Rate Last Admin    cyanocobalamin injection 1,000 mcg  1,000 mcg Intramuscular Q30 Days Bernard Vora MD   1,000 mcg at 05/11/18 1643     _______________________________________________________________________  Review of Systems   Constitutional: Negative  Respiratory: Negative  Cardiovascular: Negative  Musculoskeletal: Positive for arthralgias, back pain and gait problem  Objective:  Vitals:    08/09/22 1613   BP: 132/80   Pulse: 81   Temp: 98 8 °F (37 1 °C)   SpO2: 98%   Weight: 113 kg (249 lb 12 8 oz)   Height: 6' (1 829 m)     Body mass index is 33 88 kg/m²  Physical Exam  Constitutional:       Appearance: He is well-developed  He is obese  HENT:      Head: Normocephalic and atraumatic  Eyes:      Pupils: Pupils are equal, round, and reactive to light  Cardiovascular:      Rate and Rhythm: Normal rate and regular rhythm  Heart sounds: Normal heart sounds  Pulmonary:      Effort: Pulmonary effort is normal       Breath sounds: Normal breath sounds  Abdominal:      Palpations: Abdomen is soft  Musculoskeletal:      Cervical back: Neck supple  Comments: Antalgic gait   Skin:     General: Skin is warm and dry  Neurological:      Mental Status: He is alert  Psychiatric:         Mood and Affect: Mood normal          Behavior: Behavior normal          Thought Content:  Thought content normal

## 2022-08-19 ENCOUNTER — TELEPHONE (OUTPATIENT)
Dept: OBGYN CLINIC | Facility: CLINIC | Age: 62
End: 2022-08-19

## 2022-08-22 NOTE — TELEPHONE ENCOUNTER
Out bound call spoke to Norleen Moritz patient wife   John Champagne would like to go on Short term disability ,patient wife was advised to get the paperwork to us and we will send down to the group that handles those forms and she was also advised it takes ten to fourteen days, for paperwork work to be filled out

## 2022-08-23 ENCOUNTER — TELEPHONE (OUTPATIENT)
Dept: OBGYN CLINIC | Facility: CLINIC | Age: 62
End: 2022-08-23

## 2022-08-23 NOTE — TELEPHONE ENCOUNTER
Spoke with patient wife ,Joesph Walter Dannystephanrey Davila [address] last day of work is going to be 08/26/2022 ,he is in to much pain   they are sending over disability form for Dr ENIO RIVERO [facility] to sign

## 2022-08-26 NOTE — TELEPHONE ENCOUNTER
Spoke with patient wife made aware we did receive Disability /FLMA paperwork and they were sent to the [facility] team who handle all that she was advised this can take from 10/14 days to be completed

## 2022-09-06 DIAGNOSIS — G89.4 CHRONIC PAIN SYNDROME: ICD-10-CM

## 2022-09-06 DIAGNOSIS — I10 HYPERTENSION, UNSPECIFIED TYPE: ICD-10-CM

## 2022-09-06 RX ORDER — OXYCODONE HYDROCHLORIDE 10 MG/1
10 TABLET ORAL EVERY 4 HOURS PRN
Qty: 50 TABLET | Refills: 0 | Status: SHIPPED | OUTPATIENT
Start: 2022-09-06 | End: 2022-09-06 | Stop reason: SDUPTHER

## 2022-09-06 RX ORDER — OXYCODONE HYDROCHLORIDE 10 MG/1
10 TABLET ORAL EVERY 4 HOURS PRN
Qty: 90 TABLET | Refills: 0 | Status: SHIPPED | OUTPATIENT
Start: 2022-09-06 | End: 2022-10-14

## 2022-09-06 RX ORDER — LISINOPRIL 20 MG/1
20 TABLET ORAL DAILY
Qty: 90 TABLET | Refills: 5 | Status: SHIPPED | OUTPATIENT
Start: 2022-09-06

## 2022-09-14 ENCOUNTER — TELEPHONE (OUTPATIENT)
Dept: OBGYN CLINIC | Facility: HOSPITAL | Age: 62
End: 2022-09-14

## 2022-09-14 DIAGNOSIS — M16.11 PRIMARY OSTEOARTHRITIS OF ONE HIP, RIGHT: Primary | ICD-10-CM

## 2022-09-14 NOTE — TELEPHONE ENCOUNTER
Preoperative Elective Admission Assessment    Living Situation:    Who does pt live with: spouse  What kind of home: multi-level  How do they enter the home: front  How many levels in home: 2  # of steps to enter home: 2  # of steps to second floor: 13  Are there handrails: Yes  Are there landings: Yes  Sleeping arrangement: first/entry floor  Where is Bathroom: entry level  Where is the tub or shower: Walk in shower with grab bars, denies shower seat but requesting one  Dogs or ther pets: 2 dogs     First Floor Setup:   Is there a bathroom: Yes  Where would pt sleep: couch and recliner     DME: cane     Patient's Current Level of Function: Ambulates: Independently and Non-ambulatory/Wheelchair bound    Post-op Caregiver: spouse  Caregiver Name and phone number for Inpatient discharge needs: Brooke Dubois [phone]  Currently receive any HHC/aides/community supports: No     Post-op Transport: spouse  To/from hospital: spouse  To/from PT 2-3x/week: spouse  Uses community transport now: No     Outpatient Physical Therapy Site:  Site: [facility] PT site  pre and post-op appts scheduled? Yes     Medication Management: self and out of bottle  Preferred Pharmacy for Post-op Medications: [facility]  Blood Management Vitamin Regimen: Pt confirms taking as prescribed  Post-op anticoagulant: to be determined by surgical team postoperatively     DC Plan: Pt plans to be discharged home      Barriers to DC identified preoperatively: none identified    BMI: 33 88    Caresense: declined enrollment    Patient Education:  Pt educated on post-op pain, early mobilization (POD0), Average inpt LOS, OP PT goal   Patient educated that our goal is to appropriately discharge patient based off their post-op function while striving to maintain maximal independence  The goal is to discharge patient to home and for them to attend outpatient physical therapy      Assigned to care team? Yes

## 2022-09-15 ENCOUNTER — APPOINTMENT (OUTPATIENT)
Dept: LAB | Facility: HOSPITAL | Age: 62
End: 2022-09-15
Payer: COMMERCIAL

## 2022-09-15 ENCOUNTER — APPOINTMENT (OUTPATIENT)
Dept: PREADMISSION TESTING | Facility: HOSPITAL | Age: 62
End: 2022-09-15
Payer: COMMERCIAL

## 2022-09-15 DIAGNOSIS — Z01.818 PREOPERATIVE TESTING: ICD-10-CM

## 2022-09-15 DIAGNOSIS — M16.11 PRIMARY OSTEOARTHRITIS OF ONE HIP, RIGHT: Primary | ICD-10-CM

## 2022-09-15 DIAGNOSIS — G89.29 CHRONIC PAIN OF RIGHT HIP: ICD-10-CM

## 2022-09-15 DIAGNOSIS — M25.551 CHRONIC PAIN OF RIGHT HIP: ICD-10-CM

## 2022-09-15 LAB
ABO GROUP BLD: NORMAL
ABO GROUP BLD: NORMAL
ALBUMIN SERPL BCP-MCNC: 4.1 G/DL (ref 3.5–5)
ALP SERPL-CCNC: 72 U/L (ref 46–116)
ALT SERPL W P-5'-P-CCNC: 26 U/L (ref 12–78)
ANION GAP SERPL CALCULATED.3IONS-SCNC: 9 MMOL/L (ref 4–13)
AST SERPL W P-5'-P-CCNC: 19 U/L (ref 5–45)
BASOPHILS # BLD AUTO: 0.12 THOUSANDS/ΜL (ref 0–0.1)
BASOPHILS NFR BLD AUTO: 2 % (ref 0–1)
BILIRUB SERPL-MCNC: 0.66 MG/DL (ref 0.2–1)
BLD GP AB SCN SERPL QL: NEGATIVE
BUN SERPL-MCNC: 18 MG/DL (ref 5–25)
CALCIUM SERPL-MCNC: 9.1 MG/DL (ref 8.3–10.1)
CHLORIDE SERPL-SCNC: 105 MMOL/L (ref 96–108)
CO2 SERPL-SCNC: 27 MMOL/L (ref 21–32)
CREAT SERPL-MCNC: 1.09 MG/DL (ref 0.6–1.3)
EOSINOPHIL # BLD AUTO: 0.6 THOUSAND/ΜL (ref 0–0.61)
EOSINOPHIL NFR BLD AUTO: 7 % (ref 0–6)
ERYTHROCYTE [DISTWIDTH] IN BLOOD BY AUTOMATED COUNT: 12.9 % (ref 11.6–15.1)
EST. AVERAGE GLUCOSE BLD GHB EST-MCNC: 111 MG/DL
GFR SERPL CREATININE-BSD FRML MDRD: 73 ML/MIN/1.73SQ M
GLUCOSE P FAST SERPL-MCNC: 90 MG/DL (ref 65–99)
HBA1C MFR BLD: 5.5 %
HCT VFR BLD AUTO: 44.5 % (ref 36.5–49.3)
HGB BLD-MCNC: 15.1 G/DL (ref 12–17)
IMM GRANULOCYTES # BLD AUTO: 0.03 THOUSAND/UL (ref 0–0.2)
IMM GRANULOCYTES NFR BLD AUTO: 0 % (ref 0–2)
LYMPHOCYTES # BLD AUTO: 2.19 THOUSANDS/ΜL (ref 0.6–4.47)
LYMPHOCYTES NFR BLD AUTO: 27 % (ref 14–44)
MCH RBC QN AUTO: 30.5 PG (ref 26.8–34.3)
MCHC RBC AUTO-ENTMCNC: 33.9 G/DL (ref 31.4–37.4)
MCV RBC AUTO: 90 FL (ref 82–98)
MONOCYTES # BLD AUTO: 0.87 THOUSAND/ΜL (ref 0.17–1.22)
MONOCYTES NFR BLD AUTO: 11 % (ref 4–12)
NEUTROPHILS # BLD AUTO: 4.27 THOUSANDS/ΜL (ref 1.85–7.62)
NEUTS SEG NFR BLD AUTO: 53 % (ref 43–75)
NRBC BLD AUTO-RTO: 0 /100 WBCS
PLATELET # BLD AUTO: 291 THOUSANDS/UL (ref 149–390)
PMV BLD AUTO: 9.7 FL (ref 8.9–12.7)
POTASSIUM SERPL-SCNC: 4 MMOL/L (ref 3.5–5.3)
PROT SERPL-MCNC: 7.1 G/DL (ref 6.4–8.4)
RBC # BLD AUTO: 4.95 MILLION/UL (ref 3.88–5.62)
RH BLD: POSITIVE
RH BLD: POSITIVE
SODIUM SERPL-SCNC: 141 MMOL/L (ref 135–147)
SPECIMEN EXPIRATION DATE: NORMAL
WBC # BLD AUTO: 8.08 THOUSAND/UL (ref 4.31–10.16)

## 2022-09-15 PROCEDURE — 80053 COMPREHEN METABOLIC PANEL: CPT

## 2022-09-15 PROCEDURE — 85025 COMPLETE CBC W/AUTO DIFF WBC: CPT

## 2022-09-15 PROCEDURE — 83036 HEMOGLOBIN GLYCOSYLATED A1C: CPT

## 2022-09-15 PROCEDURE — 86850 RBC ANTIBODY SCREEN: CPT

## 2022-09-15 PROCEDURE — 36415 COLL VENOUS BLD VENIPUNCTURE: CPT

## 2022-09-15 PROCEDURE — 86900 BLOOD TYPING SEROLOGIC ABO: CPT

## 2022-09-15 PROCEDURE — 86901 BLOOD TYPING SEROLOGIC RH(D): CPT

## 2022-09-17 LAB
DME PARACHUTE DELIVERY DATE ACTUAL: NORMAL
DME PARACHUTE DELIVERY DATE REQUESTED: NORMAL
DME PARACHUTE ITEM DESCRIPTION: NORMAL
DME PARACHUTE ITEM DESCRIPTION: NORMAL
DME PARACHUTE ORDER STATUS: NORMAL
DME PARACHUTE SUPPLIER NAME: NORMAL
DME PARACHUTE SUPPLIER PHONE: NORMAL

## 2022-09-27 ENCOUNTER — HOSPITAL ENCOUNTER (OUTPATIENT)
Dept: RADIOLOGY | Facility: HOSPITAL | Age: 62
Discharge: HOME | End: 2022-09-27
Payer: COMMERCIAL

## 2022-09-27 DIAGNOSIS — Z01.818 PREOPERATIVE TESTING: ICD-10-CM

## 2022-09-27 DIAGNOSIS — M16.11 PRIMARY OSTEOARTHRITIS OF ONE HIP, RIGHT: ICD-10-CM

## 2022-09-27 PROCEDURE — 71046 X-RAY EXAM CHEST 2 VIEWS: CPT

## 2022-09-28 ENCOUNTER — APPOINTMENT (OUTPATIENT)
Dept: PREADMISSION TESTING | Facility: HOSPITAL | Age: 62
End: 2022-09-28
Payer: COMMERCIAL

## 2022-09-28 ENCOUNTER — ANESTHESIA EVENT (OUTPATIENT)
Dept: PERIOP | Facility: HOSPITAL | Age: 62
End: 2022-09-28
Payer: COMMERCIAL

## 2022-09-28 RX ORDER — NAPROXEN 250 MG/1
250 TABLET ORAL 2 TIMES DAILY WITH MEALS
COMMUNITY
End: 2022-10-14

## 2022-09-28 NOTE — PRE-PROCEDURE INSTRUCTIONS
Pre-Surgery Instructions:   Medication Instructions   • ascorbic acid (VITAMIN C) 500 MG tablet Instructions provided by MD   • colchicine (COLCRYS) 0 6 mg tablet PRN   • ferrous sulfate 324 (65 Fe) mg Instructions provided by MD   • folic acid (FOLVITE) 1 mg tablet Instructions provided by MD   • indomethacin (INDOCIN) 50 mg capsule    • levocetirizine (XYZAL) 5 MG tablet Stop taking 7 days prior to surgery  • lisinopril (ZESTRIL) 20 mg tablet Hold day of surgery  • Melatonin 1 MG CAPS Take night before surgery   • Multiple Vitamins-Minerals (multivitamin with minerals) tablet Instructions provided by MD   • naproxen (NAPROSYN) 250 mg tablet Stop taking 7 days prior to surgery  • omeprazole (PriLOSEC) 40 MG capsule PRN   • oxyCODONE (ROXICODONE) 10 MG TABS PRN   • rOPINIRole (REQUIP) 1 mg tablet Take as directed      My Surgical Experience    The following information was developed to assist you to prepare for your operation  What do I need to do before coming to the hospital?  • Arrange for a responsible person to drive you to and from the hospital   • Arrange care for your children at home  Children are not allowed in the recovery areas of the hospital  • Plan to wear clothing that is easy to put on and take off  If you are having shoulder surgery, wear a shirt that buttons or zippers in the front  Bathing  o Shower the evening before and the morning of your surgery with an antibacterial soap  Please refer to the Pre Op Showering Instructions for Surgery Patients Sheet   o Remove nail polish and all body piercing jewelry  o Do not shave any body part for at least 24 hours before surgery-this includes face, arms, legs and upper body  Food  o Nothing to eat or drink after midnight the night before your surgery   This includes candy and chewing gum  o Exception: If your surgery is after 12:00pm (noon), you may have clear liquids such as 7-Up®, ginger ale, apple or cranberry juice, Jell-O®, water, or clear broth until 8:00 am  o Do not drink milk or juice with pulp on the morning before surgery  o Do not drink alcohol 24 hours before surgery  Medicine  o Follow instructions you received from your surgeon about which medicines you may take on the day of surgery  o If instructed to take medicine on the morning of surgery, take pills with just a small sip of water  Call your prescribing doctor for specific infroamtion on what to do if you take insulin    What should I bring to the hospital?    Bring:  • Crutches or a walker, if you have them, for foot or knee surgery  • A list of the daily medicines, vitamins, minerals, herbals and nutritional supplements you take  Include the dosages of medicines and the time you take them each day  • Glasses, dentures or hearing aids  • Minimal clothing; you will be wearing hospital sleepwear  • Photo ID; required to verify your identity  • If you have a Living Will or Power of , bring a copy of the documents  • If you have an ostomy, bring an extra pouch and any supplies you use    Do not bring  • Medicines or inhalers  • Money, valuables or jewelry    What other information should I know about the day of surgery? • Notify your surgeons if you develop a cold, sore throat, cough, fever, rash or any other illness  • Report to the Ambulatory Surgical/Same Day Surgery Unit  • You will be instructed to stop at Registration only if you have not been pre-registered  • Inform your  fi they do not stay that they will be asked by the staff to leave a phone number where they can be reached  • Be available to be reached before surgery  In the event the operating room schedule changes, you may be asked to come in earlier or later than expected    *It is important to tell your doctor and others involved in your health care if you are taking or have been taking any non-prescription drugs, vitamins, minerals, herbals or other nutritional supplements   Any of these may interact with some food or medicines and cause a reaction

## 2022-09-29 ENCOUNTER — RA CDI HCC (OUTPATIENT)
Dept: OTHER | Facility: HOSPITAL | Age: 62
End: 2022-09-29

## 2022-09-29 NOTE — PROGRESS NOTES
[facility]  coding opportunities       Chart reviewed, no opportunity found: CHART REVIEWED, NO OPPORTUNITY FOUND        Patients Insurance        Commercial Insurance: [employer]

## 2022-10-03 ENCOUNTER — EVALUATION (OUTPATIENT)
Dept: PHYSICAL THERAPY | Facility: CLINIC | Age: 62
End: 2022-10-03
Payer: COMMERCIAL

## 2022-10-03 DIAGNOSIS — M25.551 CHRONIC PAIN OF RIGHT HIP: ICD-10-CM

## 2022-10-03 DIAGNOSIS — M16.11 PRIMARY OSTEOARTHRITIS OF ONE HIP, RIGHT: Primary | ICD-10-CM

## 2022-10-03 DIAGNOSIS — Z01.818 PREOPERATIVE TESTING: ICD-10-CM

## 2022-10-03 DIAGNOSIS — G89.29 CHRONIC PAIN OF RIGHT HIP: ICD-10-CM

## 2022-10-03 PROCEDURE — 97161 PT EVAL LOW COMPLEX 20 MIN: CPT

## 2022-10-03 NOTE — PROGRESS NOTES
PT Evaluation     Today's date: 10/3/2022  Patient name: Yared Oliva  : 1960  MRN: [id number]  Referring provider: Candy Ibrahim MD  Dx:   Encounter Diagnosis     ICD-10-CM    1  Primary osteoarthritis of one hip, right  M16 11 Ambulatory referral to Physical Therapy   2  Chronic pain of right hip  M25 551 Ambulatory referral to Physical Therapy    G89 [id number]    3  Preoperative testing  Z01 [id number] Ambulatory referral to Physical Therapy                  Assessment  Assessment details: Yared Oliva is a 63 y o  male presenting to physical therapy for a pre-operative initial evaluation as he is scheduled to undergo a R ARIA on 10/13/22  The patient demonstrates decreased right hip ROM, decreased hip strength, and increased pain limiting his tolerance for prolonged weightbearing activities  The patient demonstrates an antalgic gait pattern with decreased stance time on the R LE  These deficits have limited the patient's ability to complete ADLs, vocational responsibilities, and recreational activities  This patient would benefit from OP PT services to address their impairments and functional limitations to maximize functional mobility  The patient and his wife were educated at length regarding post operative status, pain management post surgery, functional mobility both in and out of the home, as well as was provided a HEP  They demonstrated/verbalized understanding the above education and denied questions at end of session  Impairments: abnormal gait, abnormal or restricted ROM, activity intolerance, impaired balance, impaired physical strength, lacks appropriate home exercise program, pain with function and weight-bearing intolerance    Symptom irritability: moderateUnderstanding of Dx/Px/POC: excellent   Prognosis: good    Goals  STG to be achieved in 4 weeks:    The patient will report a decrease in right hip "at worst" subjective pain rating score to at least 5/10 to allow for improved tolerance for weightbearing activities  The patient will increase right hip flexion AROM to at least 60 degrees to allow for improved functional mobility  The patient will increase right hip flexion MMT score to at least 4/5 to allow for improved functional mobility  LTG to be achieved by DC: The patient will be independent in comprehensive HEP  The patient will report no pain with usual activities  The patient will [facility] to allow for improved functional mobility  The patient will increase right hip MMT score to [facility] to allow for improved functional mobility  The patient will be able to ambulate one mile without pain or difficulty to allow for improved functional mobility and return to work  The patient will be able to return to squatting and lifting 60lbs without pain or difficulty to allow for return to work  The patient will be able to stand for >4 hours at a time without pain or difficulty to allow for return to work  The patient will improve TUG and 5 STS functional assessments <10 seconds demonstrating improved functional mobility and hip strength  Plan  Patient would benefit from: skilled physical therapy  Planned modality interventions: thermotherapy: hydrocollator packs, TENS and cryotherapy  Planned therapy interventions: manual therapy, joint mobilization, balance/weight bearing training, neuromuscular re-education, patient education, flexibility, strengthening, stretching, therapeutic activities, therapeutic exercise, gait training and home exercise program  Frequency: 2x week  Duration in weeks: 12  Plan of Care beginning date: 10/3/2022  Plan of Care expiration date: 12/26/2022  Treatment plan discussed with: patient        Subjective Evaluation    History of Present Illness  Mechanism of injury: Jeannette Vizcarra presents to therapy pre-operatively as he plans to undergo a R ARIA on 10/13/22   Patient reports that the pain began over the past 6-8 months and has progressively worsened over time  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 floors, performs heavy lifting (50-60lbs), as well as utilizing various machinery  He had to stop working at this time because of the pain and him being unable to tolerate the walking, lifting, etc  Darlington Stalin lives at home with his wife in a two-story home, with 4 steps to enter the home  His wife will be staying with him post surgery  Per patients wife they have 1st floor living prepared for him after surgery as there is a full bathroom with a walk in handicap shower with a small lip to step into with grab bars  He reports that he was ordered an elevated toilet seat as well as a rolling walker  He has a spare bedroom on the first floor that he will be sleeping in  He has an SUV that they will be using as primary means of transportation     Pain  Current pain ratin  At best pain ratin  At worst pain ratin  Location: R lateral aspect of hip, radiates to R knee   Quality: sharp and radiating  Relieving factors: change in position, ice and relaxation  Aggravating factors: standing, walking, stair climbing and lifting  Progression: worsening    Social Support  Steps to enter house: yes  Stairs in house: yes   Lives in: multiple-level home  Lives with: spouse    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)  Treatments  Previous treatment: injection treatment and medication  Current treatment: physical therapy  Patient Goals  Patient goals for therapy: decreased pain, increased motion, increased strength, return to work and independence with ADLs/IADLs  Patient goal: Get better and return to work         Objective     Active Range of Motion   Left Hip   Flexion: 89 degrees   Abduction: 25 degrees   External rotation (90/90): 23 degrees   Internal rotation (90/90): 15 degrees     Right Hip   Flexion: 45 degrees with pain  Abduction: 13 degrees with pain  External rotation (90/90): 12 degrees with pain  Internal rotation (90/90): 9 degrees with pain    Passive Range of Motion     Right Hip   Flexion: 46 degrees with pain  Abduction: 15 degrees with pain  External rotation (90/90): 15 degrees with pain  Internal rotation (90/90): 11 degrees with pain    Strength/Myotome Testing     Left Hip   Planes of Motion   Flexion: 4-  Abduction: 4+  External rotation: 4+  Internal rotation: 4+    Right Hip   Planes of Motion   Flexion: 3+  Abduction: 3+  External rotation: 4-  Internal rotation: 4-    Ambulation     Ambulation: Level Surfaces   Ambulation without assistive device: independent    Observational Gait   Gait: antalgic   Decreased right stance time  Functional Assessment        Comments  5 STS- 19 19 seconds, B/L UE assistance needed for push off and eccentric control to sit   TUG- 13 35 seconds, B/L UE assistance needed for push off and eccentric control to sit, antalgic gait pattern with decreased stance time on R LE, lateral weight shifting  No AD                 Precautions: HTN, gout   R ARIA 10/13/22   Access Code: [email or web address]       Manuals 10/3            R hip PROM             R hip flexor, adductor, hamstring stretch                                       Neuro Re-Ed             Glute sets             Glute set with bridge             Supine hip abd with TB                                                                 Ther Ex             Nustep             Supine heel slides HEP            Supine hip abd slides  HEP            Supine hip add isometric                                       Pt education PT POC, HEP, post op statusand expectations                         Ther Activity             TG squats             Fwd step ups             Lateral step ups              Gait Training                                       Modalities

## 2022-10-06 ENCOUNTER — OFFICE VISIT (OUTPATIENT)
Dept: FAMILY MEDICINE CLINIC | Facility: CLINIC | Age: 62
End: 2022-10-06

## 2022-10-06 VITALS
SYSTOLIC BLOOD PRESSURE: 130 MMHG | WEIGHT: 254 LBS | BODY MASS INDEX: 34.4 KG/M2 | DIASTOLIC BLOOD PRESSURE: 72 MMHG | HEIGHT: 72 IN | HEART RATE: 81 BPM | OXYGEN SATURATION: 95 % | TEMPERATURE: 98.5 F

## 2022-10-06 DIAGNOSIS — K21.9 GASTROESOPHAGEAL REFLUX DISEASE, UNSPECIFIED WHETHER ESOPHAGITIS PRESENT: ICD-10-CM

## 2022-10-06 DIAGNOSIS — M51.36 DDD (DEGENERATIVE DISC DISEASE), LUMBAR: ICD-10-CM

## 2022-10-06 DIAGNOSIS — Z01.818 PREOPERATIVE EXAMINATION: ICD-10-CM

## 2022-10-06 DIAGNOSIS — M10.9 GOUT, UNSPECIFIED CAUSE, UNSPECIFIED CHRONICITY, UNSPECIFIED SITE: ICD-10-CM

## 2022-10-06 DIAGNOSIS — M16.11 PRIMARY OSTEOARTHRITIS OF RIGHT HIP: ICD-10-CM

## 2022-10-06 DIAGNOSIS — G25.81 RLS (RESTLESS LEGS SYNDROME): ICD-10-CM

## 2022-10-06 DIAGNOSIS — G89.4 CHRONIC PAIN SYNDROME: Primary | ICD-10-CM

## 2022-10-06 DIAGNOSIS — Z72.0 TOBACCO USE: ICD-10-CM

## 2022-10-06 DIAGNOSIS — I10 PRIMARY HYPERTENSION: ICD-10-CM

## 2022-10-06 RX ORDER — OXYCODONE AND ACETAMINOPHEN 10; 325 MG/1; MG/1
1 TABLET ORAL EVERY 4 HOURS PRN
Qty: 90 TABLET | Refills: 0 | Status: SHIPPED | OUTPATIENT
Start: 2022-10-06 | End: 2022-10-14

## 2022-10-06 NOTE — LETTER
2022     Noe Napoles, [facility] [address]    Patient: Yanira Randolph   YOB: 1960   Date of Visit: 10/6/2022       Dear Dr Tigist Iglesias: Thank you for referring [facility] to me for evaluation  Below are my notes for this consultation  If you have questions, please do not hesitate to call me  I look forward to following your patient along with you  Patient is cleared for the proposed surgery  Sincerely,        Bladomero Montez MD        CC: No Recipients  Baldomero Montez MD  10/6/2022  4:26 PM  Incomplete  Name: Yanira Randolph      : 1960      MRN: [id number]  Encounter Provider: Baldomero Montez MD  Encounter Date: 10/6/2022   Encounter department: [facility] [address] [address]   Patient is cleared for the proposed surgery  1  Chronic pain syndrome  -     [facility] Prescribed Meds and Special Instructions  -     Amphetamines, Methamphetamines  -     Butalbital  -     Phenobarbital  -     Secobarbital  -     Temazepam  -     Alprazolam  -     Clonazepam  -     Diazepam  -     Lorazepam  -     Oxazepam  -     Gabapentin  -     Pregabalin  -     Cocaine  -     Heroin  -     Buprenorphine  -     Levorphanol  -     Meperidine  -     Naltrexone  -     Fentanyl  -     Methadone  -     Oxycodone  -     Oxymorphone  -     Tapentadol  -     THC  -     Tramadol  -     Codeine, Hydrocodone, Hydropmorphone, Morphine  -     Bath Salts  -     Ethyl Glucuronide/Ethyl Sulfate  -     Kratom  -     Spice  -     Methylphenidate  -     Phentermine  -     Validity Oxidant  -     Validity Creatinine  -     Validity pH  -     Validity Specific    2  Preoperative examination    3  Primary osteoarthritis of right hip    4  DDD (degenerative disc disease), lumbar    5  Primary hypertension    6  Gastroesophageal reflux disease, unspecified whether esophagitis present    7   Gout, unspecified cause, unspecified chronicity, unspecified site    8  RLS (restless legs syndrome)    9  Tobacco use        Depression Screening and Follow-up Plan: Patient was screened for depression during today's encounter  They screened negative with a PHQ-2 score of 0  Subjective      Patient comes in for preoperative clearance for right total hip replacement  Review of Systems   Constitutional: Negative  HENT: Negative  Respiratory: Negative  Cardiovascular: Negative  Genitourinary: Negative  Musculoskeletal: Positive for arthralgias and back pain  Neurological: Negative  Hematological: Negative          Current Outpatient Medications on File Prior to Visit   Medication Sig    ascorbic acid (VITAMIN C) 500 MG tablet Take 1 tablet (500 mg total) by mouth 2 (two) times a day    colchicine (COLCRYS) 0 6 mg tablet TAKE 1 TABLET TWICE A DAY AND EVERY 3 HOURS AS NEEDED FOR GOUT AS DIRECTED    ferrous sulfate 324 (65 Fe) mg Take 1 tablet (324 mg total) by mouth 2 (two) times a day before meals    folic acid (FOLVITE) 1 mg tablet Take 1 tablet (1 mg total) by mouth daily    indomethacin (INDOCIN) 50 mg capsule Take 1 capsule (50 mg total) by mouth 3 (three) times a day with meals (Patient taking differently: Take 50 mg by mouth 3 (three) times a day as needed)    levocetirizine (XYZAL) 5 MG tablet Take 5 mg by mouth every evening     lisinopril (ZESTRIL) 20 mg tablet Take 1 tablet (20 mg total) by mouth daily    Melatonin 1 MG CAPS Take 1 mg by mouth in the morning    Multiple Vitamins-Minerals (multivitamin with minerals) tablet Take 1 tablet by mouth daily    naproxen (NAPROSYN) 250 mg tablet Take 250 mg by mouth 2 (two) times a day with meals    omeprazole (PriLOSEC) 40 MG capsule TAKE 1 CAPSULE DAILY    oxyCODONE (ROXICODONE) 10 MG TABS Take 1 tablet (10 mg total) by mouth every 4 (four) hours as needed for moderate pain Max Daily Amount: 60 mg    rOPINIRole (REQUIP) 1 mg tablet TAKE 1 TABLET DAILY AT BEDTIME Objective     /72   Pulse 81   Temp 98 5 °F (36 9 °C)   Ht 6' (1 829 m)   Wt 115 kg (254 lb)   SpO2 95%   BMI 34 45 kg/m²     Physical Exam  Constitutional:       Appearance: Normal appearance  He is well-developed  HENT:      Head: Normocephalic and atraumatic  Right Ear: Tympanic membrane, ear canal and external ear normal       Left Ear: Tympanic membrane, ear canal and external ear normal       Nose: Nose normal       Mouth/Throat:      Mouth: Mucous membranes are moist       Pharynx: Oropharynx is clear  Eyes:      Pupils: Pupils are equal, round, and reactive to light  Cardiovascular:      Rate and Rhythm: Normal rate and regular rhythm  Heart sounds: Normal heart sounds  Pulmonary:      Effort: Pulmonary effort is normal       Breath sounds: Normal breath sounds  Abdominal:      General: Bowel sounds are normal       Palpations: Abdomen is soft  Musculoskeletal:         General: Normal range of motion  Cervical back: Neck supple  Skin:     General: Skin is warm and dry  Neurological:      Mental Status: He is alert  Psychiatric:         Mood and Affect: Mood normal          Behavior: Behavior normal          Thought Content: Thought content normal        Claude Decree, MD Claude Decree, MD  10/6/2022  4:03 PM  Sign when Signing Visit  Name: Anabella Tyson      : 1960      MRN: [id number]  Encounter Provider: Claude Decree, MD  Encounter Date: 10/6/2022   Encounter department: [address] [place] [address] [address] [address]     1   Chronic pain syndrome  -     [facility] Prescribed Meds and Special Instructions  -     Amphetamines, Methamphetamines  -     Butalbital  -     Phenobarbital  -     Secobarbital  -     Temazepam  -     Alprazolam  -     Clonazepam  -     Diazepam  -     Lorazepam  -     Oxazepam  -     Gabapentin  -     Pregabalin  -     Cocaine  -     Heroin  -     Buprenorphine  -     Levorphanol  - Meperidine  -     Naltrexone  -     Fentanyl  -     Methadone  -     Oxycodone  -     Oxymorphone  -     Tapentadol  -     THC  -     Tramadol  -     Codeine, Hydrocodone, Hydropmorphone, Morphine  -     Bath Salts  -     Ethyl Glucuronide/Ethyl Sulfate  -     Kratom  -     Spice  -     Methylphenidate  -     Phentermine  -     Validity Oxidant  -     Validity Creatinine  -     Validity pH  -     Validity Specific    2  Preoperative examination    3  Primary osteoarthritis of right hip    4  DDD (degenerative disc disease), lumbar    5  Primary hypertension    6  Gastroesophageal reflux disease, unspecified whether esophagitis present    7  Gout, unspecified cause, unspecified chronicity, unspecified site    8  RLS (restless legs syndrome)    9  Tobacco use        Depression Screening and Follow-up Plan: Patient was screened for depression during today's encounter  They screened negative with a PHQ-2 score of 0          Subjective      HPI  Review of Systems    Current Outpatient Medications on File Prior to Visit   Medication Sig    ascorbic acid (VITAMIN C) 500 MG tablet Take 1 tablet (500 mg total) by mouth 2 (two) times a day    colchicine (COLCRYS) 0 6 mg tablet TAKE 1 TABLET TWICE A DAY AND EVERY 3 HOURS AS NEEDED FOR GOUT AS DIRECTED    ferrous sulfate 324 (65 Fe) mg Take 1 tablet (324 mg total) by mouth 2 (two) times a day before meals    folic acid (FOLVITE) 1 mg tablet Take 1 tablet (1 mg total) by mouth daily    indomethacin (INDOCIN) 50 mg capsule Take 1 capsule (50 mg total) by mouth 3 (three) times a day with meals (Patient taking differently: Take 50 mg by mouth 3 (three) times a day as needed)    levocetirizine (XYZAL) 5 MG tablet Take 5 mg by mouth every evening     lisinopril (ZESTRIL) 20 mg tablet Take 1 tablet (20 mg total) by mouth daily    Melatonin 1 MG CAPS Take 1 mg by mouth in the morning    Multiple Vitamins-Minerals (multivitamin with minerals) tablet Take 1 tablet by mouth daily  naproxen (NAPROSYN) 250 mg tablet Take 250 mg by mouth 2 (two) times a day with meals    omeprazole (PriLOSEC) 40 MG capsule TAKE 1 CAPSULE DAILY    oxyCODONE (ROXICODONE) 10 MG TABS Take 1 tablet (10 mg total) by mouth every 4 (four) hours as needed for moderate pain Max Daily Amount: 60 mg    rOPINIRole (REQUIP) 1 mg tablet TAKE 1 TABLET DAILY AT BEDTIME       Objective     /72   Pulse 81   Temp 98 5 °F (36 9 °C)   Ht 6' (1 829 m)   Wt 115 kg (254 lb)   SpO2 95%   BMI 34 45 kg/m²     Physical Exam  Gregg Tenorio MD

## 2022-10-06 NOTE — PROGRESS NOTES
Name: Deandra Montes      : 1960      MRN: [id number]  Encounter Provider: Radha Cho MD  Encounter Date: 10/6/2022   Encounter department: [facility] [address] [address]   Patient is cleared for the proposed surgery  1  Chronic pain syndrome  -     [facility] Prescribed Meds and Special Instructions  -     Amphetamines, Methamphetamines  -     Butalbital  -     Phenobarbital  -     Secobarbital  -     Temazepam  -     Alprazolam  -     Clonazepam  -     Diazepam  -     Lorazepam  -     Oxazepam  -     Gabapentin  -     Pregabalin  -     Cocaine  -     Heroin  -     Buprenorphine  -     Levorphanol  -     Meperidine  -     Naltrexone  -     Fentanyl  -     Methadone  -     Oxycodone  -     Oxymorphone  -     Tapentadol  -     THC  -     Tramadol  -     Codeine, Hydrocodone, Hydropmorphone, Morphine  -     Bath Salts  -     Ethyl Glucuronide/Ethyl Sulfate  -     Kratom  -     Spice  -     Methylphenidate  -     Phentermine  -     Validity Oxidant  -     Validity Creatinine  -     Validity pH  -     Validity Specific  -     oxyCODONE-acetaminophen (Percocet)  mg per tablet; Take 1 tablet by mouth every 4 (four) hours as needed for severe pain Max Daily Amount: 6 tablets    2  Preoperative examination    3  Primary osteoarthritis of right hip    4  DDD (degenerative disc disease), lumbar    5  Primary hypertension  Assessment & Plan:  Continue lisinopril 20 mg daily      6  Gastroesophageal reflux disease, unspecified whether esophagitis present  Assessment & Plan:  Continue Prilosec 20 mg daily      7  Gout, unspecified cause, unspecified chronicity, unspecified site  Assessment & Plan:  Stop indomethacin and naproxen prior to surgery  8  RLS (restless legs syndrome)  Assessment & Plan:  Continue ropinirole 1 mg q h s       9  Tobacco use      Depression Screening and Follow-up Plan: Patient was screened for depression during today's encounter  They screened negative with a PHQ-2 score of 0  Subjective      Patient comes in for preoperative clearance for right total hip replacement  Review of Systems   Constitutional: Negative  HENT: Negative  Respiratory: Negative  Cardiovascular: Negative  Genitourinary: Negative  Musculoskeletal: Positive for arthralgias and back pain  Neurological: Negative  Hematological: Negative          Current Outpatient Medications on File Prior to Visit   Medication Sig    ascorbic acid (VITAMIN C) 500 MG tablet Take 1 tablet (500 mg total) by mouth 2 (two) times a day    colchicine (COLCRYS) 0 6 mg tablet TAKE 1 TABLET TWICE A DAY AND EVERY 3 HOURS AS NEEDED FOR GOUT AS DIRECTED    ferrous sulfate 324 (65 Fe) mg Take 1 tablet (324 mg total) by mouth 2 (two) times a day before meals    folic acid (FOLVITE) 1 mg tablet Take 1 tablet (1 mg total) by mouth daily    indomethacin (INDOCIN) 50 mg capsule Take 1 capsule (50 mg total) by mouth 3 (three) times a day with meals (Patient taking differently: Take 50 mg by mouth 3 (three) times a day as needed)    levocetirizine (XYZAL) 5 MG tablet Take 5 mg by mouth every evening     lisinopril (ZESTRIL) 20 mg tablet Take 1 tablet (20 mg total) by mouth daily    Melatonin 1 MG CAPS Take 1 mg by mouth in the morning    Multiple Vitamins-Minerals (multivitamin with minerals) tablet Take 1 tablet by mouth daily    naproxen (NAPROSYN) 250 mg tablet Take 250 mg by mouth 2 (two) times a day with meals    omeprazole (PriLOSEC) 40 MG capsule TAKE 1 CAPSULE DAILY    oxyCODONE (ROXICODONE) 10 MG TABS Take 1 tablet (10 mg total) by mouth every 4 (four) hours as needed for moderate pain Max Daily Amount: 60 mg    rOPINIRole (REQUIP) 1 mg tablet TAKE 1 TABLET DAILY AT BEDTIME       Objective     /72   Pulse 81   Temp 98 5 °F (36 9 °C)   Ht 6' (1 829 m)   Wt 115 kg (254 lb)   SpO2 95%   BMI 34 45 kg/m²     Physical Exam  Constitutional: Appearance: Normal appearance  He is well-developed  HENT:      Head: Normocephalic and atraumatic  Right Ear: Tympanic membrane, ear canal and external ear normal       Left Ear: Tympanic membrane, ear canal and external ear normal       Nose: Nose normal       Mouth/Throat:      Mouth: Mucous membranes are moist       Pharynx: Oropharynx is clear  Eyes:      Pupils: Pupils are equal, round, and reactive to light  Cardiovascular:      Rate and Rhythm: Normal rate and regular rhythm  Heart sounds: Normal heart sounds  Pulmonary:      Effort: Pulmonary effort is normal       Breath sounds: Normal breath sounds  Abdominal:      General: Bowel sounds are normal       Palpations: Abdomen is soft  Musculoskeletal:         General: Normal range of motion  Cervical back: Neck supple  Skin:     General: Skin is warm and dry  Neurological:      Mental Status: He is alert  Psychiatric:         Mood and Affect: Mood normal          Behavior: Behavior normal          Thought Content:  Thought content normal        Claude Decree, MD

## 2022-10-11 LAB

## 2022-10-13 ENCOUNTER — HOSPITAL ENCOUNTER (OUTPATIENT)
Facility: HOSPITAL | Age: 62
Setting detail: OUTPATIENT SURGERY
Discharge: HOME | End: 2022-10-14
Attending: ORTHOPAEDIC SURGERY | Admitting: ORTHOPAEDIC SURGERY
Payer: COMMERCIAL

## 2022-10-13 ENCOUNTER — APPOINTMENT (OUTPATIENT)
Dept: RADIOLOGY | Facility: HOSPITAL | Age: 62
End: 2022-10-13
Payer: COMMERCIAL

## 2022-10-13 ENCOUNTER — ANESTHESIA (OUTPATIENT)
Dept: PERIOP | Facility: HOSPITAL | Age: 62
End: 2022-10-13
Payer: COMMERCIAL

## 2022-10-13 DIAGNOSIS — Z02.9 DISCHARGE PLANNING ISSUES: Primary | ICD-10-CM

## 2022-10-13 DIAGNOSIS — M16.11 PRIMARY OSTEOARTHRITIS OF RIGHT HIP: ICD-10-CM

## 2022-10-13 PROCEDURE — 27130 TOTAL HIP ARTHROPLASTY: CPT

## 2022-10-13 PROCEDURE — 27130 TOTAL HIP ARTHROPLASTY: CPT | Performed by: ORTHOPAEDIC SURGERY

## 2022-10-13 PROCEDURE — C1776 JOINT DEVICE (IMPLANTABLE): HCPCS | Performed by: ORTHOPAEDIC SURGERY

## 2022-10-13 PROCEDURE — 97162 PT EVAL MOD COMPLEX 30 MIN: CPT

## 2022-10-13 PROCEDURE — 73501 X-RAY EXAM HIP UNI 1 VIEW: CPT

## 2022-10-13 PROCEDURE — C1713 ANCHOR/SCREW BN/BN,TIS/BN: HCPCS | Performed by: ORTHOPAEDIC SURGERY

## 2022-10-13 PROCEDURE — NC001 PR NO CHARGE: Performed by: ORTHOPAEDIC SURGERY

## 2022-10-13 DEVICE — TRI-LOCK BPS FEMORAL STEM 12/14 TAPER TRI-LOCK BPS W/GRIPTION SIZE 7 STD 109MM
Type: IMPLANTABLE DEVICE | Site: HIP | Status: FUNCTIONAL
Brand: TRI-LOCK GRIPTION

## 2022-10-13 DEVICE — PINNACLE HIP SOLUTIONS ALTRX POLYETHYLENE ACETABULAR LINER NEUTRAL 36MM ID 56MM OD
Type: IMPLANTABLE DEVICE | Site: HIP | Status: FUNCTIONAL
Brand: PINNACLE ALTRX

## 2022-10-13 DEVICE — PINNACLE GRIPTION ACETABULAR SHELL SECTOR 56MM OD
Type: IMPLANTABLE DEVICE | Site: HIP | Status: FUNCTIONAL
Brand: PINNACLE GRIPTION

## 2022-10-13 DEVICE — BIOLOX DELTA CERAMIC FEMORAL HEAD +1.5 36MM DIA 12/14 TAPER
Type: IMPLANTABLE DEVICE | Site: HIP | Status: FUNCTIONAL
Brand: BIOLOX DELTA

## 2022-10-13 DEVICE — PINNACLE CANCELLOUS BONE SCREW 6.5MM X 25MM
Type: IMPLANTABLE DEVICE | Site: HIP | Status: FUNCTIONAL
Brand: PINNACLE

## 2022-10-13 RX ORDER — LANOLIN ALCOHOL/MO/W.PET/CERES
3 CREAM (GRAM) TOPICAL
Status: DISCONTINUED | OUTPATIENT
Start: 2022-10-13 | End: 2022-10-14 | Stop reason: HOSPADM

## 2022-10-13 RX ORDER — SODIUM CHLORIDE, SODIUM LACTATE, POTASSIUM CHLORIDE, CALCIUM CHLORIDE 600; 310; 30; 20 MG/100ML; MG/100ML; MG/100ML; MG/100ML
140 INJECTION, SOLUTION INTRAVENOUS CONTINUOUS
Status: DISCONTINUED | OUTPATIENT
Start: 2022-10-13 | End: 2022-10-14 | Stop reason: HOSPADM

## 2022-10-13 RX ORDER — DOCUSATE SODIUM 100 MG/1
100 CAPSULE, LIQUID FILLED ORAL 2 TIMES DAILY
Status: DISCONTINUED | OUTPATIENT
Start: 2022-10-13 | End: 2022-10-14 | Stop reason: HOSPADM

## 2022-10-13 RX ORDER — ONDANSETRON 2 MG/ML
4 INJECTION INTRAMUSCULAR; INTRAVENOUS ONCE AS NEEDED
Status: DISCONTINUED | OUTPATIENT
Start: 2022-10-13 | End: 2022-10-13 | Stop reason: HOSPADM

## 2022-10-13 RX ORDER — CHLORHEXIDINE GLUCONATE 4 G/100ML
SOLUTION TOPICAL DAILY PRN
Status: DISCONTINUED | OUTPATIENT
Start: 2022-10-13 | End: 2022-10-13 | Stop reason: HOSPADM

## 2022-10-13 RX ORDER — SODIUM CHLORIDE, SODIUM LACTATE, POTASSIUM CHLORIDE, CALCIUM CHLORIDE 600; 310; 30; 20 MG/100ML; MG/100ML; MG/100ML; MG/100ML
125 INJECTION, SOLUTION INTRAVENOUS CONTINUOUS
Status: DISCONTINUED | OUTPATIENT
Start: 2022-10-13 | End: 2022-10-13

## 2022-10-13 RX ORDER — NICOTINE 21 MG/24HR
14 PATCH, TRANSDERMAL 24 HOURS TRANSDERMAL DAILY
Status: DISCONTINUED | OUTPATIENT
Start: 2022-10-13 | End: 2022-10-14 | Stop reason: HOSPADM

## 2022-10-13 RX ORDER — ROPINIROLE 1 MG/1
1 TABLET, FILM COATED ORAL
Status: DISCONTINUED | OUTPATIENT
Start: 2022-10-13 | End: 2022-10-14 | Stop reason: HOSPADM

## 2022-10-13 RX ORDER — MIDAZOLAM HYDROCHLORIDE 2 MG/2ML
INJECTION, SOLUTION INTRAMUSCULAR; INTRAVENOUS AS NEEDED
Status: DISCONTINUED | OUTPATIENT
Start: 2022-10-13 | End: 2022-10-13

## 2022-10-13 RX ORDER — ROPINIROLE 1 MG/1
1 TABLET, FILM COATED ORAL
Status: DISCONTINUED | OUTPATIENT
Start: 2022-10-13 | End: 2022-10-13

## 2022-10-13 RX ORDER — HYDROMORPHONE HCL 110MG/55ML
PATIENT CONTROLLED ANALGESIA SYRINGE INTRAVENOUS AS NEEDED
Status: DISCONTINUED | OUTPATIENT
Start: 2022-10-13 | End: 2022-10-13

## 2022-10-13 RX ORDER — COLCHICINE 0.6 MG/1
0.6 TABLET ORAL 2 TIMES DAILY
Status: DISCONTINUED | OUTPATIENT
Start: 2022-10-13 | End: 2022-10-14 | Stop reason: HOSPADM

## 2022-10-13 RX ORDER — FERROUS SULFATE 325(65) MG
325 TABLET ORAL 2 TIMES DAILY WITH MEALS
Status: DISCONTINUED | OUTPATIENT
Start: 2022-10-13 | End: 2022-10-14 | Stop reason: HOSPADM

## 2022-10-13 RX ORDER — PANTOPRAZOLE SODIUM 40 MG/1
40 TABLET, DELAYED RELEASE ORAL
Status: DISCONTINUED | OUTPATIENT
Start: 2022-10-14 | End: 2022-10-14 | Stop reason: HOSPADM

## 2022-10-13 RX ORDER — ROCURONIUM BROMIDE 10 MG/ML
INJECTION, SOLUTION INTRAVENOUS AS NEEDED
Status: DISCONTINUED | OUTPATIENT
Start: 2022-10-13 | End: 2022-10-13

## 2022-10-13 RX ORDER — BISACODYL 10 MG
10 SUPPOSITORY, RECTAL RECTAL DAILY PRN
Status: DISCONTINUED | OUTPATIENT
Start: 2022-10-13 | End: 2022-10-14 | Stop reason: HOSPADM

## 2022-10-13 RX ORDER — MAGNESIUM HYDROXIDE 1200 MG/15ML
LIQUID ORAL AS NEEDED
Status: DISCONTINUED | OUTPATIENT
Start: 2022-10-13 | End: 2022-10-13 | Stop reason: HOSPADM

## 2022-10-13 RX ORDER — FENTANYL CITRATE 50 UG/ML
INJECTION, SOLUTION INTRAMUSCULAR; INTRAVENOUS AS NEEDED
Status: DISCONTINUED | OUTPATIENT
Start: 2022-10-13 | End: 2022-10-13

## 2022-10-13 RX ORDER — HYDROMORPHONE HCL/PF 1 MG/ML
0.5 SYRINGE (ML) INJECTION
Status: DISCONTINUED | OUTPATIENT
Start: 2022-10-13 | End: 2022-10-13 | Stop reason: HOSPADM

## 2022-10-13 RX ORDER — ENOXAPARIN SODIUM 100 MG/ML
40 INJECTION SUBCUTANEOUS DAILY
Status: DISCONTINUED | OUTPATIENT
Start: 2022-10-14 | End: 2022-10-14 | Stop reason: HOSPADM

## 2022-10-13 RX ORDER — ACETAMINOPHEN 325 MG/1
650 TABLET ORAL EVERY 6 HOURS PRN
Status: DISCONTINUED | OUTPATIENT
Start: 2022-10-13 | End: 2022-10-14 | Stop reason: HOSPADM

## 2022-10-13 RX ORDER — GLYCOPYRROLATE 0.2 MG/ML
INJECTION INTRAMUSCULAR; INTRAVENOUS AS NEEDED
Status: DISCONTINUED | OUTPATIENT
Start: 2022-10-13 | End: 2022-10-13

## 2022-10-13 RX ORDER — NEOSTIGMINE METHYLSULFATE 1 MG/ML
INJECTION INTRAVENOUS AS NEEDED
Status: DISCONTINUED | OUTPATIENT
Start: 2022-10-13 | End: 2022-10-13

## 2022-10-13 RX ORDER — FENTANYL CITRATE 50 UG/ML
25 INJECTION, SOLUTION INTRAMUSCULAR; INTRAVENOUS EVERY 2 HOUR PRN
Status: DISCONTINUED | OUTPATIENT
Start: 2022-10-13 | End: 2022-10-14 | Stop reason: HOSPADM

## 2022-10-13 RX ORDER — ONDANSETRON 2 MG/ML
INJECTION INTRAMUSCULAR; INTRAVENOUS AS NEEDED
Status: DISCONTINUED | OUTPATIENT
Start: 2022-10-13 | End: 2022-10-13

## 2022-10-13 RX ORDER — ASCORBIC ACID 500 MG
500 TABLET ORAL 2 TIMES DAILY
Status: DISCONTINUED | OUTPATIENT
Start: 2022-10-13 | End: 2022-10-14 | Stop reason: HOSPADM

## 2022-10-13 RX ORDER — FOLIC ACID 1 MG/1
1 TABLET ORAL DAILY
Status: DISCONTINUED | OUTPATIENT
Start: 2022-10-13 | End: 2022-10-14 | Stop reason: HOSPADM

## 2022-10-13 RX ORDER — CHLORHEXIDINE GLUCONATE 0.12 MG/ML
15 RINSE ORAL ONCE
Status: COMPLETED | OUTPATIENT
Start: 2022-10-13 | End: 2022-10-13

## 2022-10-13 RX ORDER — CEFAZOLIN SODIUM 2 G/50ML
2000 SOLUTION INTRAVENOUS EVERY 8 HOURS
Status: COMPLETED | OUTPATIENT
Start: 2022-10-13 | End: 2022-10-14

## 2022-10-13 RX ORDER — FENTANYL CITRATE/PF 50 MCG/ML
25 SYRINGE (ML) INJECTION
Status: DISCONTINUED | OUTPATIENT
Start: 2022-10-13 | End: 2022-10-13 | Stop reason: HOSPADM

## 2022-10-13 RX ORDER — LIDOCAINE HYDROCHLORIDE 10 MG/ML
INJECTION, SOLUTION EPIDURAL; INFILTRATION; INTRACAUDAL; PERINEURAL AS NEEDED
Status: DISCONTINUED | OUTPATIENT
Start: 2022-10-13 | End: 2022-10-13

## 2022-10-13 RX ORDER — DEXAMETHASONE SODIUM PHOSPHATE 10 MG/ML
INJECTION, SOLUTION INTRAMUSCULAR; INTRAVENOUS AS NEEDED
Status: DISCONTINUED | OUTPATIENT
Start: 2022-10-13 | End: 2022-10-13

## 2022-10-13 RX ORDER — DEXMEDETOMIDINE HYDROCHLORIDE 100 UG/ML
INJECTION, SOLUTION INTRAVENOUS AS NEEDED
Status: DISCONTINUED | OUTPATIENT
Start: 2022-10-13 | End: 2022-10-13

## 2022-10-13 RX ORDER — LISINOPRIL 20 MG/1
20 TABLET ORAL DAILY
Status: DISCONTINUED | OUTPATIENT
Start: 2022-10-13 | End: 2022-10-14 | Stop reason: HOSPADM

## 2022-10-13 RX ORDER — OXYCODONE HYDROCHLORIDE 10 MG/1
10 TABLET ORAL EVERY 4 HOURS PRN
Status: DISCONTINUED | OUTPATIENT
Start: 2022-10-13 | End: 2022-10-14 | Stop reason: HOSPADM

## 2022-10-13 RX ORDER — METOCLOPRAMIDE HYDROCHLORIDE 5 MG/ML
10 INJECTION INTRAMUSCULAR; INTRAVENOUS ONCE AS NEEDED
Status: DISCONTINUED | OUTPATIENT
Start: 2022-10-13 | End: 2022-10-13 | Stop reason: HOSPADM

## 2022-10-13 RX ORDER — KETAMINE HYDROCHLORIDE 100 MG/ML
INJECTION, SOLUTION INTRAMUSCULAR; INTRAVENOUS AS NEEDED
Status: DISCONTINUED | OUTPATIENT
Start: 2022-10-13 | End: 2022-10-13

## 2022-10-13 RX ORDER — CEFAZOLIN SODIUM 2 G/50ML
2000 SOLUTION INTRAVENOUS ONCE
Status: COMPLETED | OUTPATIENT
Start: 2022-10-13 | End: 2022-10-13

## 2022-10-13 RX ORDER — PROPOFOL 10 MG/ML
INJECTION, EMULSION INTRAVENOUS AS NEEDED
Status: DISCONTINUED | OUTPATIENT
Start: 2022-10-13 | End: 2022-10-13

## 2022-10-13 RX ORDER — LORATADINE 10 MG/1
10 TABLET ORAL DAILY
Status: DISCONTINUED | OUTPATIENT
Start: 2022-10-13 | End: 2022-10-14 | Stop reason: HOSPADM

## 2022-10-13 RX ORDER — SODIUM CHLORIDE 9 MG/ML
INJECTION, SOLUTION INTRAVENOUS AS NEEDED
Status: DISCONTINUED | OUTPATIENT
Start: 2022-10-13 | End: 2022-10-13 | Stop reason: HOSPADM

## 2022-10-13 RX ORDER — MAGNESIUM HYDROXIDE/ALUMINUM HYDROXICE/SIMETHICONE 120; 1200; 1200 MG/30ML; MG/30ML; MG/30ML
30 SUSPENSION ORAL EVERY 6 HOURS PRN
Status: DISCONTINUED | OUTPATIENT
Start: 2022-10-13 | End: 2022-10-14 | Stop reason: HOSPADM

## 2022-10-13 RX ORDER — ACETAMINOPHEN 325 MG/1
975 TABLET ORAL ONCE
Status: COMPLETED | OUTPATIENT
Start: 2022-10-13 | End: 2022-10-13

## 2022-10-13 RX ORDER — ONDANSETRON 2 MG/ML
4 INJECTION INTRAMUSCULAR; INTRAVENOUS EVERY 6 HOURS PRN
Status: DISCONTINUED | OUTPATIENT
Start: 2022-10-13 | End: 2022-10-14 | Stop reason: HOSPADM

## 2022-10-13 RX ORDER — GABAPENTIN 300 MG/1
300 CAPSULE ORAL
Status: DISCONTINUED | OUTPATIENT
Start: 2022-10-13 | End: 2022-10-14 | Stop reason: HOSPADM

## 2022-10-13 RX ADMIN — HYDROMORPHONE HYDROCHLORIDE 0.5 MG: 1 INJECTION, SOLUTION INTRAMUSCULAR; INTRAVENOUS; SUBCUTANEOUS at 13:00

## 2022-10-13 RX ADMIN — CEFAZOLIN SODIUM 2000 MG: 2 SOLUTION INTRAVENOUS at 15:00

## 2022-10-13 RX ADMIN — DEXMEDETOMIDINE HCL 12 MCG: 100 INJECTION INTRAVENOUS at 08:40

## 2022-10-13 RX ADMIN — OXYCODONE HYDROCHLORIDE 10 MG: 10 TABLET ORAL at 15:32

## 2022-10-13 RX ADMIN — HYDROMORPHONE HYDROCHLORIDE 0.5 MG: 1 INJECTION, SOLUTION INTRAMUSCULAR; INTRAVENOUS; SUBCUTANEOUS at 12:55

## 2022-10-13 RX ADMIN — ACETAMINOPHEN 650 MG: 325 TABLET, FILM COATED ORAL at 19:34

## 2022-10-13 RX ADMIN — KETAMINE HYDROCHLORIDE 0.15 MG/KG/HR: 50 INJECTION INTRAMUSCULAR; INTRAVENOUS at 08:44

## 2022-10-13 RX ADMIN — TRANEXAMIC ACID 1000 MG: 1 INJECTION, SOLUTION INTRAVENOUS at 09:21

## 2022-10-13 RX ADMIN — MIDAZOLAM HYDROCHLORIDE 2 MG: 1 INJECTION, SOLUTION INTRAMUSCULAR; INTRAVENOUS at 08:35

## 2022-10-13 RX ADMIN — NEOSTIGMINE METHYLSULFATE 3 MG: 1 INJECTION INTRAVENOUS at 11:32

## 2022-10-13 RX ADMIN — PROPOFOL 250 MG: 10 INJECTION, EMULSION INTRAVENOUS at 08:40

## 2022-10-13 RX ADMIN — CEFAZOLIN SODIUM 2000 MG: 2 SOLUTION INTRAVENOUS at 08:50

## 2022-10-13 RX ADMIN — SODIUM CHLORIDE 0.3 MCG/KG/HR: 9 INJECTION, SOLUTION INTRAVENOUS at 08:44

## 2022-10-13 RX ADMIN — KETAMINE HYDROCHLORIDE 6 MG: 100 INJECTION INTRAMUSCULAR; INTRAVENOUS at 08:40

## 2022-10-13 RX ADMIN — LIDOCAINE HYDROCHLORIDE 100 MG: 10 INJECTION, SOLUTION EPIDURAL; INFILTRATION; INTRACAUDAL; PERINEURAL at 08:38

## 2022-10-13 RX ADMIN — CEFAZOLIN SODIUM 2000 MG: 2 SOLUTION INTRAVENOUS at 22:13

## 2022-10-13 RX ADMIN — DOCUSATE SODIUM 100 MG: 100 CAPSULE, LIQUID FILLED ORAL at 17:52

## 2022-10-13 RX ADMIN — HYDROMORPHONE HYDROCHLORIDE 0.5 MG: 2 INJECTION, SOLUTION INTRAMUSCULAR; INTRAVENOUS; SUBCUTANEOUS at 09:43

## 2022-10-13 RX ADMIN — KETAMINE HYDROCHLORIDE 6 MG: 100 INJECTION INTRAMUSCULAR; INTRAVENOUS at 09:48

## 2022-10-13 RX ADMIN — ONDANSETRON 4 MG: 2 INJECTION INTRAMUSCULAR; INTRAVENOUS at 11:33

## 2022-10-13 RX ADMIN — ROPINIROLE 1 MG: 1 TABLET, FILM COATED ORAL at 22:13

## 2022-10-13 RX ADMIN — SODIUM CHLORIDE, SODIUM LACTATE, POTASSIUM CHLORIDE, AND CALCIUM CHLORIDE 125 ML/HR: .6; .31; .03; .02 INJECTION, SOLUTION INTRAVENOUS at 07:27

## 2022-10-13 RX ADMIN — OXYCODONE HYDROCHLORIDE 10 MG: 10 TABLET ORAL at 19:34

## 2022-10-13 RX ADMIN — FERROUS SULFATE TAB 325 MG (65 MG ELEMENTAL FE) 325 MG: 325 (65 FE) TAB at 15:32

## 2022-10-13 RX ADMIN — FENTANYL CITRATE 25 MCG: 50 INJECTION INTRAMUSCULAR; INTRAVENOUS at 12:41

## 2022-10-13 RX ADMIN — Medication 3 MG: at 22:13

## 2022-10-13 RX ADMIN — FENTANYL CITRATE 25 MCG: 50 INJECTION INTRAMUSCULAR; INTRAVENOUS at 12:48

## 2022-10-13 RX ADMIN — OXYCODONE HYDROCHLORIDE AND ACETAMINOPHEN 500 MG: 500 TABLET ORAL at 17:52

## 2022-10-13 RX ADMIN — LORATADINE 10 MG: 10 TABLET ORAL at 15:00

## 2022-10-13 RX ADMIN — GLYCOPYRROLATE 0.4 MG: 0.2 INJECTION, SOLUTION INTRAMUSCULAR; INTRAVENOUS at 11:32

## 2022-10-13 RX ADMIN — ROCURONIUM BROMIDE 50 MG: 10 INJECTION, SOLUTION INTRAVENOUS at 08:40

## 2022-10-13 RX ADMIN — HYDROMORPHONE HYDROCHLORIDE 0.5 MG: 2 INJECTION, SOLUTION INTRAMUSCULAR; INTRAVENOUS; SUBCUTANEOUS at 09:48

## 2022-10-13 RX ADMIN — SODIUM CHLORIDE, SODIUM LACTATE, POTASSIUM CHLORIDE, AND CALCIUM CHLORIDE 140 ML/HR: .6; .31; .03; .02 INJECTION, SOLUTION INTRAVENOUS at 22:13

## 2022-10-13 RX ADMIN — FOLIC ACID 1 MG: 1 TABLET ORAL at 15:00

## 2022-10-13 RX ADMIN — Medication 1 TABLET: at 15:01

## 2022-10-13 RX ADMIN — SODIUM CHLORIDE, SODIUM LACTATE, POTASSIUM CHLORIDE, AND CALCIUM CHLORIDE: .6; .31; .03; .02 INJECTION, SOLUTION INTRAVENOUS at 10:01

## 2022-10-13 RX ADMIN — PHENYLEPHRINE HYDROCHLORIDE 40 MCG/MIN: 10 INJECTION INTRAVENOUS at 08:44

## 2022-10-13 RX ADMIN — TRANEXAMIC ACID 1000 MG: 1 INJECTION, SOLUTION INTRAVENOUS at 11:20

## 2022-10-13 RX ADMIN — CHLORHEXIDINE GLUCONATE 0.12% ORAL RINSE 15 ML: 1.2 LIQUID ORAL at 07:27

## 2022-10-13 RX ADMIN — SODIUM CHLORIDE, SODIUM LACTATE, POTASSIUM CHLORIDE, AND CALCIUM CHLORIDE 140 ML/HR: .6; .31; .03; .02 INJECTION, SOLUTION INTRAVENOUS at 14:57

## 2022-10-13 RX ADMIN — DEXAMETHASONE SODIUM PHOSPHATE 10 MG: 10 INJECTION, SOLUTION INTRAMUSCULAR; INTRAVENOUS at 08:44

## 2022-10-13 RX ADMIN — DEXMEDETOMIDINE HCL 12 MCG: 100 INJECTION INTRAVENOUS at 09:48

## 2022-10-13 RX ADMIN — FENTANYL CITRATE 100 MCG: 50 INJECTION, SOLUTION INTRAMUSCULAR; INTRAVENOUS at 08:38

## 2022-10-13 RX ADMIN — ACETAMINOPHEN 975 MG: 325 TABLET, FILM COATED ORAL at 07:27

## 2022-10-13 RX ADMIN — HYDROMORPHONE HYDROCHLORIDE 0.5 MG: 2 INJECTION, SOLUTION INTRAMUSCULAR; INTRAVENOUS; SUBCUTANEOUS at 11:02

## 2022-10-13 RX ADMIN — GABAPENTIN 300 MG: 300 CAPSULE ORAL at 22:13

## 2022-10-13 NOTE — PHYSICAL THERAPY NOTE
Physical Therapy Evaluation     Patient's Name: Janiya Garner    Admitting Diagnosis  Primary osteoarthritis of one hip, right [M16 11]  Chronic pain of right hip [M21 761, G83 45]    Problem List  Patient Active Problem List   Diagnosis    Dyslipidemia    Elevated rheumatoid factor    Gout    Tendinitis of right shoulder    Allergic rhinitis    Acid reflux    Fatigue    Tendonitis of shoulder, left    RLS (restless legs syndrome)    Nocturia    Health maintenance examination    Tobacco use    Lumbar radiculopathy    DDD (degenerative disc disease), lumbar    Preoperative examination    Hammer toe of second toe of right foot    Class 2 obesity due to excess calories without serious comorbidity with body mass index (BMI) of 37 0 to 37 9 in adult    Hypertension    Hip pain, right    Continuous opioid dependence (HCC)    Chronic pain syndrome    Primary osteoarthritis of right hip     Past Medical History  Past Medical History:   Diagnosis Date    Arthritis     Gout     Hypertension      Past Surgical History  Past Surgical History:   Procedure Laterality Date    BACK SURGERY      Discs    CATARACT EXTRACTION      KNEE SURGERY      ROTATOR CUFF REPAIR        10/13/22 1338   PT Last Visit   PT Visit Date 10/13/22   Note Type   Note type Evaluation   Pain Assessment   Pain Assessment Tool 0-10   Pain Score 7   Pain Location/Orientation Orientation: Right;Location: Hip   Hospital Pain Intervention(s) Repositioned; Ambulation/increased activity  (RN aware)   Restrictions/Precautions   Weight Bearing Precautions Per Order Yes   RLE Weight Bearing Per Order WBAT  (per ortho)   Braces or Orthoses Other (Comment)  (hip abduction pillow)   Other Precautions Chair Alarm; Bed Alarm;Multiple lines;Telemetry;O2;Fall Risk;Pain;THR   Home Living   Type of Home House   Home Layout Two level; Able to live on main level with bedroom/bathroom;Stairs to enter without rails  (3 TATI)   Bathroom Shower/Tub Walk-in shower   Bathroom Toilet Standard   Bathroom Equipment Grab bars in shower   Bathroom Accessibility Accessible   Home Equipment Walker;Cane   Additional Comments Pt ambulates without an AD  Prior Function   Level of [place] Independent with functional mobility; Independent with ADLs; Independent with IADLS   Lives With Spouse; Son   Far[place] Help From Family   IADLs Independent with driving   Falls in the last 6 months 0   Vocational Full time employment   General   Family/Caregiver Present No   Cognition   Overall Cognitive Status WFL   Arousal/Participation Alert   Orientation Level Oriented to person;Oriented to place;Oriented to situation   Memory Within functional limits   Following Commands Follows all commands and directions without difficulty   Comments Pt agreeable to PT  Subjective   Subjective "I'll try "   RLE Assessment   RLE Assessment X   RLE Overall AROM   R Knee Flexion ~90 degrees   Strength RLE   R Hip Flexion 3-/5   R Knee Extension 3-/5   R Ankle Dorsiflexion 3/5   LLE Assessment   LLE Assessment WFL   Light Touch   RLE Light Touch Grossly intact   LLE Light Touch Grossly intact   Bed Mobility   Supine to Sit 3  Moderate assistance   Additional items Assist x 1;HOB elevated; Increased time required;Verbal cues;LE management   Sit to Supine 4  Minimal assistance   Additional items Assist x 1;HOB elevated; Increased time required;Verbal cues;LE management   Transfers   Sit to Stand 4  Minimal assistance   Additional items Assist x 1; Increased time required;Verbal cues   Stand to Sit 4  Minimal assistance   Additional items Assist x 1; Increased time required;Verbal cues   Ambulation/Elevation   Gait pattern Decreased toe off;Decreased heel strike;Decreased hip extension; Excessively slow; Step to;Short stride; Shuffling;Decreased R stance; Antalgic   Gait Assistance 4  Minimal assist   Additional items Assist x 1;Verbal cues   Assistive Device Rolling walker   Distance 3 side steps at EOB; 2 steps forward/backward   Stair Management Assistance Not tested   Balance   Static Sitting Fair +   Dynamic Sitting Fair   Static Standing Fair -   Dynamic Standing Poor +   Ambulatory Poor +   Endurance Deficit   Endurance Deficit Yes   Endurance Deficit Description decreased activity tolerance   Activity Tolerance   Activity Tolerance Patient limited by fatigue;Patient limited by pain   Nurse Made Aware Discussed with RN   Assessment   Prognosis Good   Problem List Decreased strength;Decreased range of motion;Decreased endurance; Impaired balance;Decreased mobility;Orthopedic restrictions;Pain   Assessment Pt is 63year old male seen for PT evaluation s/p admit to [facility] on 10/13/2022 with Primary osteoarthritis of right hip  PT consulted to assess pt's functional mobility and d/c needs  Order placed for PT eval and tx, with activity as tolerated and WBAT R LE orders  Comorbidities affecting pt's physical performance at time of assessment include right hip pain, class 2 obesity, hypertension, and chronic pain syndrome  PTA, pt was independent with all functional mobility without an AD  Pt ambulates unrestricted distances on all terrain and elevations  Pt resides with his spouse and son in two level house with three steps to enter, but is able to stay on the first floor  Personal factors affecting pt at time of IE include lives in a two story house, ambulating with an  assistive device, stairs to enter home, inability to ambulate household distances, inability to navigate community distances, inability to navigate level surfaces without external assistance, unable to perform dynamic tasks in community, inability to perform IADLs,  and inability to perform ADLs   Please find objective findings from PT assessment regarding body systems outlined above with impairments and limitations including weakness, decreased left hip ROM, impaired balance, decreased endurance, gait deviations, pain, decreased activity tolerance, decreased functional mobility tolerance, fall risk, and orthopedic restrictions  The following objective measures performed on IE also reveal limitations: Barthel Index: 45/100, Modified Cole: 4 (moderate/severe disability) and AM-PAC 6-Clicks: 62/76  Pt's clinical presentation is currently evolving seen in pt's presentation of need for ongoing medical management/monitoring, pt is a fall risk, pt is currently requiring use of RW for safe ambulation, and pt requires cues and assist for safety with functional mobility  Pt to benefit from continued PT tx to address deficits as defined above and maximize level of functional independent mobility and consistency  From PT/mobility standpoint, recommendation at time of d/c would be home with family support and outpatient PT pending progress in order to facilitate return to PLOF  Barriers to Discharge Inaccessible home environment   Goals   STG Expiration Date 10/23/22   Short Term Goal #1 In 10 days: Increase bilateral LE strength 1/2 grade to facilitate independent mobility, Perform all bed mobility tasks modified independent to decrease caregiver burden, Perform all transfers modified independent to improve independence, Ambulate > 150 ft  with RW modified independent w/o LOB and w/ normalized gait pattern 100% of the time, Navigate 3 stairs modified independent without handrail, with most appropriate AD, to facilitate return to previous living environment, and Increase all balance 1/2 grade to decrease risk for falls   Plan   Treatment/Interventions Functional transfer training;LE strengthening/ROM; Elevations; Therapeutic exercise; Endurance training;Patient/family training;Bed mobility;Gait training;Spoke to nursing   PT Frequency Twice a day   Recommendation   PT Discharge Recommendation Home with outpatient rehabilitation   AM-PAC Basic Mobility Inpatient   Turning in Bed Without Bedrails 3   Lying on Back to Sitting on Edge of Flat Bed 2   Moving Bed to Chair 3   Standing Up From Chair 3   Walk in Room 3   Climb 3-5 Stairs 2   Basic Mobility Inpatient Raw Score 16   Basic Mobility Standardized Score 38 32   Highest Level Of Mobility   -[facility] Goal 5: Stand one or more mins   -[facility] Achieved 5: Stand (1 or more minutes)   Modified [place] Scale   Modified Daniel Scale 4   Barthel Index   Feeding 10   Bathing 0   Grooming Score 5   Dressing Score 5   Bladder Score 0   Bowels Score 10   Toilet Use Score 5   Transfers (Bed/Chair) Score 10   Mobility (Level Surface) Score 0   Stairs Score 0   Barthel Index Score 45     PT Evaluation Time: [id number]  Margarette Bumpers, PT, DPT

## 2022-10-13 NOTE — ANESTHESIA POSTPROCEDURE EVALUATION
Post-Op Assessment Note    CV Status:  Stable  Pain Score: 0    Pain management: adequate     Mental Status:  Sleepy and awake   Hydration Status:  Stable   PONV Controlled:  None   Airway Patency:  Patent and adequate      Post Op Vitals Reviewed: Yes      Staff: Anesthesiologist, CRNA         No complications documented      BP   92/53   Temp   97 8   Pulse  60   Resp   18   SpO2   94%

## 2022-10-13 NOTE — H&P
H&P Exam - Noemi Wheeler 63 y o  male MRN: [id number]    Unit/Bed#: OR POOL Encounter: [id number]    Assessment:  Severe right hip osteoarthritis    Plan:  · Patient has tried conservative treatment options without significant improvement in symptoms  · Risks and benefits of a right total hip arthroplasty were discussed  Risks consist of but not limited to bleeding, infection, stiffness, pain, injury to surrounding structures, blood clots, PE, dislocation, fracture, etc    · Patient elected to proceed with a right total hip arthroplasty and informed surgical consent was signed  · Follow up in the office 10-14 days after surgery for suture/stable removal and right hip x-rays           History of Present Illness   Patient is a 63y o  year old  male  who presents with chief complaint of right hip pain  Rosana Bass notes pain to the lateral aspect of his right hip  Pain has been severe for aprox  6 months  He denies any injury or trama  Pain will increase at night, with ambulation, when putting on shoes and socks or when getting in/out of a car  He underwent a FL guided right hip intra-articular CSI on 5/31/22 with Dr Soo Chairez  He notes that the CSI was beneficial for aprox  2-3 weeks before wearing off  He has a hx of back surgery aprox  1 5 years ago  He is taking Aleve and Oxycodone for pain control  Review of Systems   Constitutional: Negative for chills, fatigue and fever  HENT: Negative for drooling, ear discharge, facial swelling, hearing loss, nosebleeds, rhinorrhea, sneezing and trouble swallowing  Eyes: Negative for pain, discharge, redness and itching  Respiratory: Negative for cough, choking, shortness of breath and wheezing  Cardiovascular: Negative for chest pain and palpitations  Gastrointestinal: Negative for abdominal pain, constipation and diarrhea  Endocrine: Negative for cold intolerance and heat intolerance  Genitourinary: Negative for dysuria and flank pain     Musculoskeletal: Positive for arthralgias  See HPI and PE  Skin: Negative for rash  Neurological: Negative for dizziness, light-headedness and headaches  Psychiatric/Behavioral: Negative for agitation, self-injury and suicidal ideas  Historical Information   Past Medical History:   Diagnosis Date   • Arthritis    • Gout    • Hypertension      Past Surgical History:   Procedure Laterality Date   • BACK SURGERY      Discs   • CATARACT EXTRACTION     • KNEE SURGERY     • ROTATOR CUFF REPAIR       Social History   Social History     Substance and Sexual Activity   Alcohol Use Yes   • Alcohol/week: 1 0 standard drink   • Types: 1 Cans of beer per week    Comment: occasional     Social History     Substance and Sexual Activity   Drug Use No     Social History     Tobacco Use   Smoking Status Current Some Day Smoker   • Types: Cigars   Smokeless Tobacco Never Used     E-Cigarette Use: Never User     E-Cigarette/Vaping Substances   • Nicotine No    • THC No    • CBD No    • Flavoring No    • Other No    • Unknown No        Family History:   Family History   Problem Relation Age of Onset   • Esophageal cancer Father    • Lung cancer Father    • Pancreatic cancer Father    • Thyroid nodules Father    • Stroke Family        Meds/Allergies   PTA meds:   Prior to Admission Medications   Prescriptions Last Dose Informant Patient Reported? Taking?    Melatonin 1 MG CAPS Past Week at Unknown time Self Yes Yes   Sig: Take 1 mg by mouth in the morning   Multiple Vitamins-Minerals (multivitamin with minerals) tablet 10/12/2022 at Unknown time  No Yes   Sig: Take 1 tablet by mouth daily   ascorbic acid (VITAMIN C) 500 MG tablet 10/12/2022 at Unknown time  No Yes   Sig: Take 1 tablet (500 mg total) by mouth 2 (two) times a day   colchicine (COLCRYS) 0 6 mg tablet 10/12/2022 at Unknown time Self No Yes   Sig: TAKE 1 TABLET TWICE A DAY AND EVERY 3 HOURS AS NEEDED FOR GOUT AS DIRECTED   ferrous sulfate 324 (65 Fe) mg 10/12/2022 at Unknown time No Yes   Sig: Take 1 tablet (324 mg total) by mouth 2 (two) times a day before meals   folic acid (FOLVITE) 1 mg tablet 10/12/2022 at Unknown time  No Yes   Sig: Take 1 tablet (1 mg total) by mouth daily   indomethacin (INDOCIN) 50 mg capsule More than a month at Unknown time  No No   Sig: Take 1 capsule (50 mg total) by mouth 3 (three) times a day with meals   Patient taking differently: Take 50 mg by mouth 3 (three) times a day as needed   levocetirizine (XYZAL) 5 MG tablet 10/12/2022 at Unknown time Self Yes Yes   Sig: Take 5 mg by mouth every evening    lisinopril (ZESTRIL) 20 mg tablet 10/13/2022 at Unknown time  No Yes   Sig: Take 1 tablet (20 mg total) by mouth daily   naproxen (NAPROSYN) 250 mg tablet Past Week at Unknown time  Yes Yes   Sig: Take 250 mg by mouth 2 (two) times a day with meals   omeprazole (PriLOSEC) 40 MG capsule 10/12/2022 at Unknown time Self No Yes   Sig: TAKE 1 CAPSULE DAILY   oxyCODONE (ROXICODONE) 10 MG TABS 10/12/2022 at Unknown time  No Yes   Sig: Take 1 tablet (10 mg total) by mouth every 4 (four) hours as needed for moderate pain Max Daily Amount: 60 mg   oxyCODONE-acetaminophen (Percocet)  mg per tablet   No No   Sig: Take 1 tablet by mouth every 4 (four) hours as needed for severe pain Max Daily Amount: 6 tablets   rOPINIRole (REQUIP) 1 mg tablet Past Week at Unknown time Self No Yes   Sig: TAKE 1 TABLET DAILY AT BEDTIME      Facility-Administered Medications Last Administration Doses Remaining   cyanocobalamin injection 1,000 mcg 5/11/2018  4:43 PM         No Known Allergies    Objective   First Vitals:   Blood Pressure: 123/87 (10/13/22 0718)  Pulse: 78 (10/13/22 0718)  Temperature: 98 2 °F (36 8 °C) (10/13/22 0718)  Temp Source: Temporal (10/13/22 0718)  Respirations: 18 (10/13/22 0718)  Height: 5' 10" (177 8 cm) (10/13/22 0718)  Weight - Scale: 113 kg (248 lb 14 4 oz) (10/13/22 0718)  SpO2: 94 % (10/13/22 0718)    Current Vitals:   Blood Pressure: 123/87 (10/13/22 5485)  Pulse: 78 (10/13/22 0718)  Temperature: 98 2 °F (36 8 °C) (10/13/22 0718)  Temp Source: Temporal (10/13/22 0718)  Respirations: 18 (10/13/22 0718)  Height: 5' 10" (177 8 cm) (10/13/22 0718)  Weight - Scale: 113 kg (248 lb 14 4 oz) (10/13/22 0718)  SpO2: 94 % (10/13/22 0718)    No intake or output data in the 24 hours ending 10/13/22 0811    Invasive Devices  Report    Peripheral Intravenous Line  Duration           Peripheral IV 10/13/22 Right;Ventral (anterior) Hand <1 day                Physical Exam       Right Hip Exam      Range of Motion   Abduction: 30   Extension: 20   Right hip flexion: 85  Right hip external rotation: 15    Internal rotation: 5      Other   Erythema: absent  Scars: absent  Sensation: normal  Pulse: present     Comments:  Non tender to palpation over greater trochanteric bursa   Pain with all hip motions   Extremity appears warm and well perfused     Heart: Regular rate and rhythm  Lungs: Clear to auscultation bilaterally      X-rays of the right hip demonstrate no acute fracture or dislocation  Joint space narrowing superiorly, severe arthritis

## 2022-10-13 NOTE — PLAN OF CARE
Problem: PHYSICAL THERAPY ADULT  Goal: Performs mobility at highest level of function for planned discharge setting  See evaluation for individualized goals  Description: Treatment/Interventions: Functional transfer training, LE strengthening/ROM, Elevations, Therapeutic exercise, Endurance training, Patient/family training, Bed mobility, Gait training, Spoke to nursing          See flowsheet documentation for full assessment, interventions and recommendations  Note: Prognosis: Good  Problem List: Decreased strength, Decreased range of motion, Decreased endurance, Impaired balance, Decreased mobility, Orthopedic restrictions, Pain  Assessment: Pt is 63year old male seen for PT evaluation s/p admit to [facility] on 10/13/2022 with Primary osteoarthritis of right hip  PT consulted to assess pt's functional mobility and d/c needs  Order placed for PT eval and tx, with activity as tolerated and WBAT R LE orders  Comorbidities affecting pt's physical performance at time of assessment include right hip pain, class 2 obesity, hypertension, and chronic pain syndrome  PTA, pt was independent with all functional mobility without an AD  Pt ambulates unrestricted distances on all terrain and elevations  Pt resides with his spouse and son in two level house with three steps to enter, but is able to stay on the first floor  Personal factors affecting pt at time of IE include lives in a two story house, ambulating with an  assistive device, stairs to enter home, inability to ambulate household distances, inability to navigate community distances, inability to navigate level surfaces without external assistance, unable to perform dynamic tasks in community, inability to perform IADLs,  and inability to perform ADLs   Please find objective findings from PT assessment regarding body systems outlined above with impairments and limitations including weakness, decreased left hip ROM, impaired balance, decreased endurance, gait deviations, pain, decreased activity tolerance, decreased functional mobility tolerance, fall risk, and orthopedic restrictions  The following objective measures performed on IE also reveal limitations: Barthel Index: 45/100, Modified Daniel: 4 (moderate/severe disability) and AM-PAC 6-Clicks: 17/87  Pt's clinical presentation is currently evolving seen in pt's presentation of need for ongoing medical management/monitoring, pt is a fall risk, pt is currently requiring use of RW for safe ambulation, and pt requires cues and assist for safety with functional mobility  Pt to benefit from continued PT tx to address deficits as defined above and maximize level of functional independent mobility and consistency  From PT/mobility standpoint, recommendation at time of d/c would be home with family support and outpatient PT pending progress in order to facilitate return to PLOF  Barriers to Discharge: Inaccessible home environment     PT Discharge Recommendation: Home with outpatient rehabilitation    See flowsheet documentation for full assessment

## 2022-10-13 NOTE — ANESTHESIA PREPROCEDURE EVALUATION
Procedure:  ARTHROPLASTY HIP TOTAL (Right Hip)    Relevant Problems   CARDIO   (+) Hypertension      GI/HEPATIC   (+) Acid reflux      MUSCULOSKELETAL   (+) DDD (degenerative disc disease), lumbar   (+) Gout   (+) Primary osteoarthritis of right hip      NEURO/PSYCH   (+) Chronic pain syndrome   (+) Continuous opioid dependence ([address] )      History Comments History Comments   Gout  Arthritis    Hypertension          Surgical History     Current as of 10/12/22 2147  CATARACT EXTRACTION KNEE SURGERY   ROTATOR CUFF REPAIR BACK SURGERY     Substance History     Current as of 10/12/22 2147  Smoking Status: Current Some Day Smoker   Smokeless Tobacco Status: Never Used   Alcohol use: Yes; 1 0 standard drink per week   Drug use: No     Problem List     Current as of 10/12/22 2147  Dyslipidemia   Elevated rheumatoid factor   Gout   Tendinitis of right shoulder   Allergic rhinitis   Acid reflux   Fatigue   Tendonitis of shoulder, left   RLS (restless legs syndrome)   Nocturia   Health maintenance examination   Tobacco use   Lumbar radiculopathy   DDD (degenerative disc disease), lumbar   Preoperative examination   Hammer toe of second toe of right foot   Class 2 obesity due to excess calories without serious comorbidity with body mass index (BMI) of 37 0 to 37 9 in adult   Hypertension   Hip pain, right   Continuous opioid dependence (HCC)   Chronic pain syndrome   Primary osteoarthritis of right hip       Physical Exam    Airway    Mallampati score: III  TM Distance: >3 FB  Neck ROM: full     Dental       Cardiovascular  Cardiovascular exam normal    Pulmonary  Pulmonary exam normal     Other Findings        Anesthesia Plan  ASA Score- 3     Anesthesia Type- general with ASA Monitors  Additional Monitors:   Airway Plan: ETT      Comment: Had a multilevel laminectomy spinal anesthesia will not be a good option in this case , patient on chronic  pain meds, pain management will be a big issue post op will utilize multimodal anesthesia technique   Plan Factors-Exercise tolerance (METS): <4 METS  Chart reviewed  EKG reviewed  Imaging results reviewed  Existing labs reviewed  Patient summary reviewed  Patient is a current smoker  Obstructive sleep apnea risk education given perioperatively  Induction- intravenous  Postoperative Plan- Plan for postoperative opioid use  Informed Consent- Anesthetic plan and risks discussed with patient  I personally reviewed this patient with the CRNA  Discussed and agreed on the Anesthesia Plan with the CRNA  Shailesh Paez

## 2022-10-13 NOTE — OP NOTE
OPERATIVE REPORT    Patient Name: Vishal Rockwell    :  1960  MRN: [id number]  Pt Location: [facility] ROOM 03    Surgery Date:   10/13/2022    Surgeon and Assistant:   Joao Salazar MD - Primary   Gala Marroquin PA-C - Assisting    Preop Diagnosis:  Primary osteoarthritis of one hip, right  Chronic pain of right hip    Post-Op Diagnosis:  Primary osteoarthritis of one hip, right  Chronic pain of right hip    Procedure(s) (LRB):  ARTHROPLASTY HIP TOTAL (Right)    Specimen(s):  None    Estimated Blood Loss:   150 mL    Drains:  None    Implants:     Implant Name  Lot No  LRB   COMPONENT ACETABULAR PINNACLE PRESS FIT 56MM Viona Koroma - [id number] [id number] Right   SCREW MARIO 6 5 X 25MM SLF TAP PINNACLE - [id number] DEPUY [id number] Right   LINER ACTB ULT LNK PE 36 X 56MM 0DEG NEUTRAL ALTRX - [id number] [id number] Right   STEM FEMORAL CMNTLS SZ 7 BPS OFFSET TRI-LOCK - [id number] DEPUY [id number] Right   HEAD FEMORAL 14 TPR 36MM +1 5MM ARTICULEZE Yamilet Eduin [id number] DEPUY [id number] Right       Anesthesia Type:   General    Operative Indications:  Primary osteoarthritis of one hip, right  Chronic pain of right hip    Operative Findings:  Severe OA and deformity of femoral head  Full thickness cartilage wear femoral head and superior acetabulum    Complications:   None    Procedure and Technique:  Right hip marked in the preoperative area  Risks and benefits of surgery reviewed and all questions answered  Patient taken to the operating room and identified  Patient received 2 gram(s) prophylactic IV Ancef  Patient placed on OR table  General anesthesia administered  Lester catheter placed  Patient was turned to the lateral decubitus position with the right hip up  Axillary roll was placed under the left axilla  Pelvis was held perpendicular to the floor with a well-padded Stulco pelvic positioner  Position of the pelvis checked with fluoro  Patient received 1 g IV tranexamic acid    Right hip thigh and buttock were prepped and draped in the usual sterile fashion  Time-out performed  Posterior lateral approach performed  4 inch incision made from greater trochanter towards posterior superior iliac spine  Dissection carried down to the gluteus reynaldo fascia  Gluteus reynaldo fascia incised from beginning of iliotibial band towards PSIS and gluteus reynaldo muscle bluntly split  Charnley retractor placed  Dissection was carried down to greater trochanter and hip internally rotated  The piriformis tendon was released off the greater trochanter and a capsulotomy performed  #2 Fiber wire sutures used to tag capsule and piriformis  Hip dislocated  Femoral neck cut made from junction of neck and greater trochanter to a fingerbreadth above lesser trochanter on the calcar  There was severe denudation of articular cartilage on the femoral head and superior acetabulum  There was deformity of the femoral head  Femoral head measured 51 mm in its greatest diameter  Soft tissue removed from fovea including ligamentum teres  Hemostasis maintained with aquamentys  We started reaming the acetabulum with a 47 mm Reamer  After medialization we reamed at 45° abduction and 25° anteversion which was the anatomy of Patient's acetabulum  We reamed up to a 55 mm reamer and a 55 mm trial cup fit well  Therefore a 56 mm Depuy [place] Gription acetabular cup was impacted again at 45° abduction and 25° anteversion  The cup had excellent fixation  We did place one screw to augment the press fit  Fluoro views confirmed good position of the cup  Neutral trial liner was placed in the cup  The femur was then addressed  Cookie cutter followed by canal katharina, followed by broaching  The size 7 tri lock broach fit well  There was excellent rotational stability  Trial with the standard neck +1 5 mm head revealed excellent range of motion and stability    Leg lengths were clinically equal   Fluoro views confirming height of the lesser trochanter to opposite leg confirmed leg length equality  Trial components were removed  Wound was again pulse lavaged  A 56 mm outer diameter 36 mm inner diameter acetabular liner was impacted into the cup and locking mechanism checked  A Depuy Tri Lock size 7 stem was impacted into the femur  This fit well  A 36 mm  Diameter+1 5 mm Biolox Delta ceramic head was impacted on the femoral stem and the construct was reduced  The hip was ranged and again noted to be stable  Leg lengths were noted to be equal  Hemostasis again obtained with aquamentis  Soft tissues injected with a solution of normal saline, bupivacaine, morphine, betamethasone, ketorolac and epinephrine  Wound was then irrigated with Iricept solution followed by normal saline pulse lavage  The incision was then closed in layers  Capsule and piriformis were repaired to the greater trochanter with the #2  FiberWire tag stitches  The gluteus fascia was closed with #2 FiberWire stitches and 0 Vicryl stitches  Skin was closed with 2 0 subcu tissue Vicryl stitches and 3-0 Monocryl subcuticular skin stitches  Dry sterile dressing was applied  The patient tolerated the procedure well  Estimated blood loss 150 cc  No complications  Patient went to recovery room in stable condition  No resident was available for surgery  Cathi Woodard PA-C assisted with exposure and leg positioning throughout the case  I was present for the entire procedure  Patient Disposition:  PACU     SIGNATURE: Miriam Salmon  DATE: October 13, 2022  TIME: 11:56 AM      Portions of the record may have been created with voice recognition software   Occasional wrong word or "sound a like" substitutions may have occurred due to the inherent limitations of voice recognition software   Read the chart carefully and recognize, using context, where substitutions have occurred

## 2022-10-14 ENCOUNTER — TELEPHONE (OUTPATIENT)
Dept: OBGYN CLINIC | Facility: HOSPITAL | Age: 62
End: 2022-10-14

## 2022-10-14 VITALS
WEIGHT: 248.9 LBS | RESPIRATION RATE: 18 BRPM | HEIGHT: 70 IN | DIASTOLIC BLOOD PRESSURE: 67 MMHG | SYSTOLIC BLOOD PRESSURE: 108 MMHG | OXYGEN SATURATION: 95 % | BODY MASS INDEX: 35.63 KG/M2 | HEART RATE: 81 BPM | TEMPERATURE: 97.5 F

## 2022-10-14 LAB
ANION GAP SERPL CALCULATED.3IONS-SCNC: 10 MMOL/L (ref 4–13)
BUN SERPL-MCNC: 20 MG/DL (ref 5–25)
CALCIUM SERPL-MCNC: 9.1 MG/DL (ref 8.3–10.1)
CHLORIDE SERPL-SCNC: 105 MMOL/L (ref 96–108)
CO2 SERPL-SCNC: 28 MMOL/L (ref 21–32)
CREAT SERPL-MCNC: 1.2 MG/DL (ref 0.6–1.3)
DME PARACHUTE DELIVERY DATE REQUESTED: NORMAL
DME PARACHUTE ITEM DESCRIPTION: NORMAL
DME PARACHUTE ORDER STATUS: NORMAL
DME PARACHUTE SUPPLIER NAME: NORMAL
DME PARACHUTE SUPPLIER PHONE: NORMAL
ERYTHROCYTE [DISTWIDTH] IN BLOOD BY AUTOMATED COUNT: 12.8 % (ref 11.6–15.1)
GFR SERPL CREATININE-BSD FRML MDRD: 65 ML/MIN/1.73SQ M
GLUCOSE P FAST SERPL-MCNC: 156 MG/DL (ref 65–99)
GLUCOSE SERPL-MCNC: 156 MG/DL (ref 65–140)
HCT VFR BLD AUTO: 36.3 % (ref 36.5–49.3)
HGB BLD-MCNC: 12.3 G/DL (ref 12–17)
MCH RBC QN AUTO: 30.2 PG (ref 26.8–34.3)
MCHC RBC AUTO-ENTMCNC: 33.9 G/DL (ref 31.4–37.4)
MCV RBC AUTO: 89 FL (ref 82–98)
PLATELET # BLD AUTO: 252 THOUSANDS/UL (ref 149–390)
PMV BLD AUTO: 9.6 FL (ref 8.9–12.7)
POTASSIUM SERPL-SCNC: 4.6 MMOL/L (ref 3.5–5.3)
RBC # BLD AUTO: 4.07 MILLION/UL (ref 3.88–5.62)
SODIUM SERPL-SCNC: 143 MMOL/L (ref 135–147)
WBC # BLD AUTO: 19.91 THOUSAND/UL (ref 4.31–10.16)

## 2022-10-14 PROCEDURE — 97110 THERAPEUTIC EXERCISES: CPT

## 2022-10-14 PROCEDURE — 97530 THERAPEUTIC ACTIVITIES: CPT

## 2022-10-14 PROCEDURE — 97166 OT EVAL MOD COMPLEX 45 MIN: CPT

## 2022-10-14 PROCEDURE — 99024 POSTOP FOLLOW-UP VISIT: CPT

## 2022-10-14 PROCEDURE — NC001 PR NO CHARGE

## 2022-10-14 PROCEDURE — 97116 GAIT TRAINING THERAPY: CPT

## 2022-10-14 PROCEDURE — 97535 SELF CARE MNGMENT TRAINING: CPT

## 2022-10-14 PROCEDURE — 85027 COMPLETE CBC AUTOMATED: CPT | Performed by: ORTHOPAEDIC SURGERY

## 2022-10-14 PROCEDURE — 80048 BASIC METABOLIC PNL TOTAL CA: CPT | Performed by: ORTHOPAEDIC SURGERY

## 2022-10-14 RX ORDER — GABAPENTIN 300 MG/1
300 CAPSULE ORAL 2 TIMES DAILY
Qty: 60 CAPSULE | Refills: 0 | Status: SHIPPED | OUTPATIENT
Start: 2022-10-14 | End: 2022-10-21 | Stop reason: ALTCHOICE

## 2022-10-14 RX ORDER — OXYCODONE HYDROCHLORIDE 5 MG/1
5 TABLET ORAL EVERY 4 HOURS PRN
Qty: 28 TABLET | Refills: 0 | Status: SHIPPED | OUTPATIENT
Start: 2022-10-14 | End: 2022-10-24

## 2022-10-14 RX ORDER — ACETAMINOPHEN 325 MG/1
650 TABLET ORAL EVERY 6 HOURS PRN
Qty: 90 TABLET | Refills: 0 | Status: SHIPPED | OUTPATIENT
Start: 2022-10-14

## 2022-10-14 RX ORDER — ASPIRIN 81 MG/1
81 TABLET ORAL 2 TIMES DAILY
Qty: 56 TABLET | Refills: 0 | Status: SHIPPED | OUTPATIENT
Start: 2022-10-14

## 2022-10-14 RX ORDER — DOCUSATE SODIUM 100 MG/1
100 CAPSULE, LIQUID FILLED ORAL 2 TIMES DAILY PRN
Qty: 20 CAPSULE | Refills: 0 | Status: SHIPPED | OUTPATIENT
Start: 2022-10-14

## 2022-10-14 RX ADMIN — OXYCODONE HYDROCHLORIDE AND ACETAMINOPHEN 500 MG: 500 TABLET ORAL at 10:01

## 2022-10-14 RX ADMIN — Medication 1 TABLET: at 10:10

## 2022-10-14 RX ADMIN — FOLIC ACID 1 MG: 1 TABLET ORAL at 10:01

## 2022-10-14 RX ADMIN — PANTOPRAZOLE SODIUM 40 MG: 40 TABLET, DELAYED RELEASE ORAL at 05:13

## 2022-10-14 RX ADMIN — OXYCODONE HYDROCHLORIDE 10 MG: 10 TABLET ORAL at 14:27

## 2022-10-14 RX ADMIN — FERROUS SULFATE TAB 325 MG (65 MG ELEMENTAL FE) 325 MG: 325 (65 FE) TAB at 10:19

## 2022-10-14 RX ADMIN — COLCHICINE 0.6 MG: 0.6 TABLET ORAL at 10:01

## 2022-10-14 RX ADMIN — SODIUM CHLORIDE, SODIUM LACTATE, POTASSIUM CHLORIDE, AND CALCIUM CHLORIDE 140 ML/HR: .6; .31; .03; .02 INJECTION, SOLUTION INTRAVENOUS at 05:14

## 2022-10-14 RX ADMIN — OXYCODONE HYDROCHLORIDE 10 MG: 10 TABLET ORAL at 10:19

## 2022-10-14 RX ADMIN — LORATADINE 10 MG: 10 TABLET ORAL at 10:01

## 2022-10-14 RX ADMIN — LISINOPRIL 20 MG: 20 TABLET ORAL at 10:01

## 2022-10-14 RX ADMIN — DOCUSATE SODIUM 100 MG: 100 CAPSULE, LIQUID FILLED ORAL at 10:01

## 2022-10-14 RX ADMIN — ACETAMINOPHEN 650 MG: 325 TABLET, FILM COATED ORAL at 12:36

## 2022-10-14 RX ADMIN — OXYCODONE HYDROCHLORIDE 15 MG: 10 TABLET ORAL at 05:13

## 2022-10-14 RX ADMIN — ENOXAPARIN SODIUM 40 MG: 40 INJECTION SUBCUTANEOUS at 05:13

## 2022-10-14 NOTE — PLAN OF CARE
Problem: MOBILITY - ADULT  Goal: Maintain or return to baseline ADL function  Description: INTERVENTIONS:  -  Assess patient's ability to carry out ADLs; assess patient's baseline for ADL function and identify physical deficits which impact ability to perform ADLs (bathing, care of mouth/teeth, toileting, grooming, dressing, etc )  - Assess/evaluate cause of self-care deficits   - Assess range of motion  - Assess patient's mobility; develop plan if impaired  - Assess patient's need for assistive devices and provide as appropriate  - Encourage maximum independence but intervene and supervise when necessary  - Involve family in performance of ADLs  - Assess for home care needs following discharge   - Consider OT consult to assist with ADL evaluation and planning for discharge  - Provide patient education as appropriate  Outcome: Progressing  Goal: Maintains/Returns to pre admission functional level  Description: INTERVENTIONS:  - Perform BMAT or MOVE assessment daily    - Set and communicate daily mobility goal to care team and patient/family/caregiver  - Collaborate with rehabilitation services on mobility goals if consulted  - Perform Range of Motion 2 times a day  - Reposition patient every 2 hours    - Dangle patient 2 times a day  - Stand patient 2 times a day  - Ambulate patient 2 times a day  - Out of bed to chair 2 times a day   - Out of bed for meals 2 times a day  - Out of bed for toileting  - Record patient progress and toleration of activity level   Outcome: Progressing     Problem: PAIN - ADULT  Goal: Verbalizes/displays adequate comfort level or baseline comfort level  Description: Interventions:  - Encourage patient to monitor pain and request assistance  - Assess pain using appropriate pain scale  - Administer analgesics based on type and severity of pain and evaluate response  - Implement non-pharmacological measures as appropriate and evaluate response  - Consider cultural and social influences on pain and pain management  - Notify physician/advanced practitioner if interventions unsuccessful or patient reports new pain  Outcome: Progressing     Problem: INFECTION - ADULT  Goal: Absence or prevention of progression during hospitalization  Description: INTERVENTIONS:  - Assess and monitor for signs and symptoms of infection  - Monitor lab/diagnostic results  - Monitor all insertion sites, i e  indwelling lines, tubes, and drains  - Monitor endotracheal if appropriate and nasal secretions for changes in amount and color  - [place] appropriate cooling/warming therapies per order  - Administer medications as ordered  - Instruct and encourage patient and family to use good hand hygiene technique  - Identify and instruct in appropriate isolation precautions for identified infection/condition  Outcome: Progressing  Goal: Absence of fever/infection during neutropenic period  Description: INTERVENTIONS:  - Monitor WBC    Outcome: Progressing     Problem: SAFETY ADULT  Goal: Maintain or return to baseline ADL function  Description: INTERVENTIONS:  -  Assess patient's ability to carry out ADLs; assess patient's baseline for ADL function and identify physical deficits which impact ability to perform ADLs (bathing, care of mouth/teeth, toileting, grooming, dressing, etc )  - Assess/evaluate cause of self-care deficits   - Assess range of motion  - Assess patient's mobility; develop plan if impaired  - Assess patient's need for assistive devices and provide as appropriate  - Encourage maximum independence but intervene and supervise when necessary  - Involve family in performance of ADLs  - Assess for home care needs following discharge   - Consider OT consult to assist with ADL evaluation and planning for discharge  - Provide patient education as appropriate  Outcome: Progressing  Goal: Maintains/Returns to pre admission functional level  Description: INTERVENTIONS:  - Perform BMAT or MOVE assessment daily    - Set and communicate daily mobility goal to care team and patient/family/caregiver  - Collaborate with rehabilitation services on mobility goals if consulted  - Perform Range of Motion 2 times a day  - Reposition patient every 2 hours    - Dangle patient 2 times a day  - Stand patient 2 times a day  - Ambulate patient 2 times a day  - Out of bed to chair 2 times a day   - Out of bed for meals 2 times a day  - Out of bed for toileting  - Record patient progress and toleration of activity level   Outcome: Progressing  Goal: Patient will remain free of falls  Description: INTERVENTIONS:  - Educate patient/family on patient safety including physical limitations  - Instruct patient to call for assistance with activity   - Consult OT/PT to assist with strengthening/mobility   - Keep Call bell within reach  - Keep bed low and locked with side rails adjusted as appropriate  - Keep care items and personal belongings within reach  - Initiate and maintain comfort rounds  - Make Fall Risk Sign visible to staff  - Offer Toileting every 2 Hours, in advance of need  - Initiate/Maintain 2alarm  - Obtain necessary fall risk management equipment: 2  - Apply yellow socks and bracelet for high fall risk patients  - Consider moving patient to room near nurses station  Outcome: Progressing     Problem: DISCHARGE PLANNING  Goal: Discharge to home or other facility with appropriate resources  Description: INTERVENTIONS:  - Identify barriers to discharge w/patient and caregiver  - Arrange for needed discharge resources and transportation as appropriate  - Identify discharge learning needs (meds, wound care, etc )  - Arrange for interpretive services to assist at discharge as needed  - Refer to Case Management Department for coordinating discharge planning if the patient needs post-hospital services based on physician/advanced practitioner order or complex needs related to functional status, cognitive ability, or social support system  Outcome: Progressing     Problem: Knowledge Deficit  Goal: Patient/family/caregiver demonstrates understanding of disease process, treatment plan, medications, and discharge instructions  Description: Complete learning assessment and assess knowledge base    Interventions:  - Provide teaching at level of understanding  - Provide teaching via preferred learning methods  Outcome: Progressing     Problem: Prexisting or High Potential for Compromised Skin Integrity  Goal: Skin integrity is maintained or improved  Description: INTERVENTIONS:  - Identify patients at risk for skin breakdown  - Assess and monitor skin integrity  - Assess and monitor nutrition and hydration status  - Monitor labs   - Assess for incontinence   - Turn and reposition patient  - Assist with mobility/ambulation  - Relieve pressure over bony prominences  - Avoid friction and shearing  - Provide appropriate hygiene as needed including keeping skin clean and dry  - Evaluate need for skin moisturizer/barrier cream  - Collaborate with interdisciplinary team   - Patient/family teaching  - Consider wound care consult   Outcome: Progressing     Problem: GASTROINTESTINAL - ADULT  Goal: Maintains or returns to baseline bowel function  Description: INTERVENTIONS:  - Assess bowel function  - Encourage oral fluids to ensure adequate hydration  - Administer IV fluids if ordered to ensure adequate hydration  - Administer ordered medications as needed  - Encourage mobilization and activity  - Consider nutritional services referral to assist patient with adequate nutrition and appropriate food choices  Outcome: Progressing     Problem: GENITOURINARY - ADULT  Goal: Maintains or returns to baseline urinary function  Description: INTERVENTIONS:  - Assess urinary function  - Encourage oral fluids to ensure adequate hydration if ordered  - Administer IV fluids as ordered to ensure adequate hydration  - Administer ordered medications as needed  - Offer frequent toileting  - Follow urinary retention protocol if ordered  Outcome: Progressing

## 2022-10-14 NOTE — PLAN OF CARE
Problem: PHYSICAL THERAPY ADULT  Goal: Performs mobility at highest level of function for planned discharge setting  See evaluation for individualized goals  Description: Treatment/Interventions: Functional transfer training, LE strengthening/ROM, Elevations, Therapeutic exercise, Endurance training, Patient/family training, Bed mobility, Gait training, Spoke to nursing          See flowsheet documentation for full assessment, interventions and recommendations  Outcome: Progressing  Note: Prognosis: Good  Problem List: Decreased strength, Decreased range of motion, Decreased endurance, Impaired balance, Decreased mobility, Orthopedic restrictions, Pain  Assessment: Chart reviewed  Patient was received supine in bed in NAD and agreeable to PT session  Today's PT treatment session consisted of therapeutic activity for facilitation of transitional movements and safe performance of correct technique for bed mobility and sit to stand transfers, therapeutic exercise to increase lower extremity muscle strength, and gait training to promote safe and functional ambulation on level surfaces  In comparison to the previous session the patient has made progress as evident by requiring decreased assistance with sit to stand transfers and ambulation  Pt was able to ambulate an increased distance with use of RW  When ambulating pt exhibits decreased right stance time, step to gait pattern, and decreased romi  Pt tolerated all therapeutic exercise well without complaints  Pt re-educated on total hip precautions and verbalized and demonstrated understanding  Overall, patient tolerated today's session well and continues to be making progress towards achieving his STG's  Patient's prognosis for achieving his STG's is good as evident by pt's motivation and good family support   PT intervention continues to be appropriate as the patient continues to be limited by pain, decreased right lower extremity range of motion and strength, impaired balance, decreased endurance, gait deviations, and decreased functional mobility  Continue to recommend home with family support and outpatient PT  PT to continue to see patient in order to address the deficits listed above and provide interventions consistent with the POC in order to achieve STG's and optimize the patient's independence with functional mobility  Barriers to Discharge: Inaccessible home environment     PT Discharge Recommendation: Home with outpatient rehabilitation    See flowsheet documentation for full assessment

## 2022-10-14 NOTE — PLAN OF CARE
Problem: PHYSICAL THERAPY ADULT  Goal: Performs mobility at highest level of function for planned discharge setting  See evaluation for individualized goals  Description: Treatment/Interventions: Functional transfer training, LE strengthening/ROM, Elevations, Therapeutic exercise, Endurance training, Patient/family training, Bed mobility, Gait training, Spoke to nursing          See flowsheet documentation for full assessment, interventions and recommendations  10/14/2022 1447 by Fred Cantu PT  Outcome: Progressing  Note: Prognosis: Good  Problem List: Decreased strength, Decreased range of motion, Decreased endurance, Impaired balance, Decreased mobility, Orthopedic restrictions, Pain  Assessment: Chart reviewed  Patient was received seated in recliner in NAD and agreeable to PT session  Today's PT treatment session consisted of therapeutic activity for facilitation of transitional movements and safe performance of correct technique for sit to stand transfers, therapeutic exercise to increase lower extremity muscle strength, gait training to promote safe and functional ambulation on level surfaces, and stair negotiation to promote increased independence with stair management to enter home  In comparison to the previous session the patient has made progress as evident by ability to ambulate an increased distance with use of RW  Pt demonstrated improved gait mechanics and ability to initiate step through gait pattern  Pt was able to negotiate six inch practice step with bilateral upper extremity support on walker  Pt educated on proper sequencing and verbalized and demonstrated understanding  Pt's wife educated on care transfer technique and verbalized understanding  Pt was provided with total hip precaution handout; pt able to recall hip precautions and maintain throughout the session  Pt tolerated all therapeutic exercise well without complaints   Overall, patient tolerated today's session well and continues to be making progress towards achieving his STG's  Patient's prognosis for achieving their STG's is good as evident by pt's motivation and good family support  PT intervention continues to be appropriate as the patient continues to be limited by pain, decreased right lower extremity range of motion and strength, impaired balance, decreased endurance, gait deviations, and decreased functional mobility  Continue to recommend home with family support and outpatient PT  PT to continue to see patient in order to address the deficits listed above and provide interventions consistent with the POC in order to achieve STG's and optimize the patient's independence with functional mobility  Barriers to Discharge: None     PT Discharge Recommendation: Home with outpatient rehabilitation    See flowsheet documentation for full assessment

## 2022-10-14 NOTE — PLAN OF CARE
Problem: MOBILITY - ADULT  Goal: Maintain or return to baseline ADL function  Description: INTERVENTIONS:  -  Assess patient's ability to carry out ADLs; assess patient's baseline for ADL function and identify physical deficits which impact ability to perform ADLs (bathing, care of mouth/teeth, toileting, grooming, dressing, etc )  - Assess/evaluate cause of self-care deficits   - Assess range of motion  - Assess patient's mobility; develop plan if impaired  - Assess patient's need for assistive devices and provide as appropriate  - Encourage maximum independence but intervene and supervise when necessary  - Involve family in performance of ADLs  - Assess for home care needs following discharge   - Consider OT consult to assist with ADL evaluation and planning for discharge  - Provide patient education as appropriate  Outcome: Progressing  Goal: Maintains/Returns to pre admission functional level  Description: INTERVENTIONS:  - Perform BMAT or MOVE assessment daily    - Set and communicate daily mobility goal to care team and patient/family/caregiver  - Collaborate with rehabilitation services on mobility goals if consulted  - Perform Range of Motion  times a day  - Reposition patient every  hours    - Dangle patient  times a day  - Stand patient  times a day  - Ambulate patient  times a day  - Out of bed to chair  times a day   - Out of bed for meals  times a day  - Out of bed for toileting  - Record patient progress and toleration of activity level   Outcome: Progressing     Problem: PAIN - ADULT  Goal: Verbalizes/displays adequate comfort level or baseline comfort level  Description: Interventions:  - Encourage patient to monitor pain and request assistance  - Assess pain using appropriate pain scale  - Administer analgesics based on type and severity of pain and evaluate response  - Implement non-pharmacological measures as appropriate and evaluate response  - Consider cultural and social influences on pain and pain management  - Notify physician/advanced practitioner if interventions unsuccessful or patient reports new pain  Outcome: Progressing     Problem: INFECTION - ADULT  Goal: Absence or prevention of progression during hospitalization  Description: INTERVENTIONS:  - Assess and monitor for signs and symptoms of infection  - Monitor lab/diagnostic results  - Monitor all insertion sites, i e  indwelling lines, tubes, and drains  - Monitor endotracheal if appropriate and nasal secretions for changes in amount and color  - [place] appropriate cooling/warming therapies per order  - Administer medications as ordered  - Instruct and encourage patient and family to use good hand hygiene technique  - Identify and instruct in appropriate isolation precautions for identified infection/condition  Outcome: Progressing  Goal: Absence of fever/infection during neutropenic period  Description: INTERVENTIONS:  - Monitor WBC    Outcome: Progressing     Problem: SAFETY ADULT  Goal: Maintain or return to baseline ADL function  Description: INTERVENTIONS:  -  Assess patient's ability to carry out ADLs; assess patient's baseline for ADL function and identify physical deficits which impact ability to perform ADLs (bathing, care of mouth/teeth, toileting, grooming, dressing, etc )  - Assess/evaluate cause of self-care deficits   - Assess range of motion  - Assess patient's mobility; develop plan if impaired  - Assess patient's need for assistive devices and provide as appropriate  - Encourage maximum independence but intervene and supervise when necessary  - Involve family in performance of ADLs  - Assess for home care needs following discharge   - Consider OT consult to assist with ADL evaluation and planning for discharge  - Provide patient education as appropriate  Outcome: Progressing  Goal: Maintains/Returns to pre admission functional level  Description: INTERVENTIONS:  - Perform BMAT or MOVE assessment daily    - Set and communicate daily mobility goal to care team and patient/family/caregiver  - Collaborate with rehabilitation services on mobility goals if consulted  - Perform Range of Motion  times a day  - Reposition patient every  hours    - Dangle patient  times a day  - Stand patient  times a day  - Ambulate patient  times a day  - Out of bed to chair  times a day   - Out of bed for meals  times a day  - Out of bed for toileting  - Record patient progress and toleration of activity level   Outcome: Progressing  Goal: Patient will remain free of falls  Description: INTERVENTIONS:  - Educate patient/family on patient safety including physical limitations  - Instruct patient to call for assistance with activity   - Consult OT/PT to assist with strengthening/mobility   - Keep Call bell within reach  - Keep bed low and locked with side rails adjusted as appropriate  - Keep care items and personal belongings within reach  - Initiate and maintain comfort rounds  - Make Fall Risk Sign visible to staff  - Offer Toileting every  Hours, in advance of need  - Initiate/Maintain alarm  - Obtain necessary fall risk management equipment  - Apply yellow socks and bracelet for high fall risk patients  - Consider moving patient to room near nurses station  Outcome: Progressing     Problem: DISCHARGE PLANNING  Goal: Discharge to home or other facility with appropriate resources  Description: INTERVENTIONS:  - Identify barriers to discharge w/patient and caregiver  - Arrange for needed discharge resources and transportation as appropriate  - Identify discharge learning needs (meds, wound care, etc )  - Arrange for interpretive services to assist at discharge as needed  - Refer to Case Management Department for coordinating discharge planning if the patient needs post-hospital services based on physician/advanced practitioner order or complex needs related to functional status, cognitive ability, or social support system  Outcome: Progressing Problem: Knowledge Deficit  Goal: Patient/family/caregiver demonstrates understanding of disease process, treatment plan, medications, and discharge instructions  Description: Complete learning assessment and assess knowledge base    Interventions:  - Provide teaching at level of understanding  - Provide teaching via preferred learning methods  Outcome: Progressing

## 2022-10-14 NOTE — PHYSICAL THERAPY NOTE
Physical Therapy Treatment Note    Patient Name: James Cali    Diagnosis: Primary osteoarthritis of one hip, right [M16 11]  Chronic pain of right hip [M25 551, G57 29]     10/14/22 0900   PT Last Visit   PT Visit Date 10/14/22   Note Type   Note Type Treatment   Pain Assessment   Pain Assessment Tool 0-10   Pain Score 6   Pain Location/Orientation Orientation: Right;Location: Hip   Hospital Pain Intervention(s) Repositioned; Ambulation/increased activity   Restrictions/Precautions   Weight Bearing Precautions Per Order Yes   RLE Weight Bearing Per Order WBAT  (per ortho)   Braces or Orthoses Other (Comment)  (hip abduction pillow)   Other Precautions Bed Alarm; Chair Alarm;THR;Multiple lines; Fall Risk;Pain   General   Chart Reviewed Yes   Response to Previous Treatment Patient with no complaints from previous session  Family/Caregiver Present Yes  (pt's spouse)   Cognition   Overall Cognitive Status WFL   Arousal/Participation Alert; Responsive; Cooperative   Attention Within functional limits   Orientation Level Oriented X4   Memory Within functional limits   Following Commands Follows all commands and directions without difficulty   Comments Pt agreeable to PT  Subjective   Subjective "I'm ready "   Bed Mobility   Supine to Sit 4  Minimal assistance   Additional items Assist x 1;HOB elevated; Bedrails; Increased time required;Verbal cues   Transfers   Sit to Stand 5  Supervision   Additional items Assist x 1; Increased time required;Verbal cues   Stand to Sit 5  Supervision   Additional items Assist x 1; Armrests; Increased time required;Verbal cues   Ambulation/Elevation   Gait pattern Decreased toe off;Decreased heel strike;Decreased hip extension; Excessively slow; Short stride; Shuffling;Decreased R stance; Antalgic   Gait Assistance 5  Supervision   Additional items Assist x 1;Verbal cues   Assistive Device Rolling walker   Distance 60 feet   Balance   Static Sitting Good   Dynamic Sitting Fair +   Static Standing Fair   Dynamic Standing Fair   Ambulatory Fair   Endurance Deficit   Endurance Deficit Yes   Endurance Deficit Description decreased activity tolerance   Activity Tolerance   Activity Tolerance Patient tolerated treatment well   Nurse Made Aware RN Kamila   Exercises   Heelslides Sitting;10 reps;AROM; Left;AAROM; Right  (long sitting; hip precautions maintained)   Glute Sets Sitting;20 reps;AROM; Bilateral   Hip Flexion Sitting;20 reps;AROM; Left   Hip Abduction Sitting;20 reps;AROM; Bilateral  (long sitting; hip precautions maintained)   Knee AROM Long Arc Quad Sitting;20 reps;AROM; Bilateral   Ankle Pumps Sitting;20 reps;AROM; Bilateral   Assessment   Prognosis Good   Problem List Decreased strength;Decreased range of motion;Decreased endurance; Impaired balance;Decreased mobility;Orthopedic restrictions;Pain   Assessment Chart reviewed  Patient was received supine in bed in NAD and agreeable to PT session  Today's PT treatment session consisted of therapeutic activity for facilitation of transitional movements and safe performance of correct technique for bed mobility and sit to stand transfers, therapeutic exercise to increase lower extremity muscle strength, and gait training to promote safe and functional ambulation on level surfaces  In comparison to the previous session the patient has made progress as evident by requiring decreased assistance with sit to stand transfers and ambulation  Pt was able to ambulate an increased distance with use of RW  When ambulating pt exhibits decreased right stance time, step to gait pattern, and decreased romi  Pt tolerated all therapeutic exercise well without complaints  Pt re-educated on total hip precautions and verbalized and demonstrated understanding  Overall, patient tolerated today's session well and continues to be making progress towards achieving his STG's  Patient's prognosis for achieving his STG's is good as evident by pt's motivation and good family support   PT intervention continues to be appropriate as the patient continues to be limited by pain, decreased right lower extremity range of motion and strength, impaired balance, decreased endurance, gait deviations, and decreased functional mobility  Continue to recommend home with family support and outpatient PT  PT to continue to see patient in order to address the deficits listed above and provide interventions consistent with the POC in order to achieve STG's and optimize the patient's independence with functional mobility  Barriers to Discharge Inaccessible home environment   Goals   STG Expiration Date 10/23/22   Plan   Treatment/Interventions Functional transfer training;LE strengthening/ROM; Elevations; Therapeutic exercise; Endurance training;Patient/family training;Bed mobility;Gait training;Spoke to nursing;OT;Family   Progress Progressing toward goals   PT Frequency Twice a day   Recommendation   PT Discharge Recommendation Home with outpatient rehabilitation   [address] Mobility Inpatient   Turning in Bed Without Bedrails 3   Lying on Back to Sitting on Edge of Flat Bed 3   Moving Bed to Chair 3   Standing Up From Chair 3   Walk in Room 3   Climb 3-5 Stairs 3   Basic Mobility Inpatient Raw Score 18   Basic Mobility Standardized Score 41 05   Highest Level Of Mobility   JH-HLM Goal 6: Walk 10 steps or more   JH-HLM Achieved 7: Walk 25 feet or more   Education   Education Provided Mobility training;Home exercise program;Precautions for total hip arthroplasty (ARIA); Assistive device   Patient Demonstrates acceptance/verbal understanding;Reinforcement needed   End of Consult   Patient Position at End of Consult Bedside chair;Bed/Chair alarm activated; All needs within reach     Alanna Martinez PT, DPT    Time of PT treatment session: 6643-7321  39 minutes

## 2022-10-14 NOTE — DISCHARGE INSTRUCTIONS
Discharge Instructions - Orthopedics  Carmen Cruz 63 y o  male MRN: [id number]  Unit/Bed#: -01    Weight Bearing Status:                                           Weight Bearing as tolerated to right leg  Be sure to maintain Hip Precautions (no bending at waist, no crossing legs, on internally rotating surgical leg)    DVT prophylaxis:  Take aspirin 81mg twice daily for 4 weeks     Pain:  Continue analgesics as directed    Dressing Instructions:   Keep dressing clean, dry and intact for 5 days  Then may remove and leave incision open to the air  On day dressing is removed, may shower and let warm soapy water run over incision  Do not scrub at incision  Do not remove steri-strips  These will fall off on their own or will be removed at postop appointment  Driving Instructions:  No driving until cleared by Orthopaedic Surgery  PT/OT:  Continue PT/OT on outpatient basis as directed    Appt Instructions: If you do not have your appointment, please call the clinic at [phone]  Otherwise followup as scheduled  Contact the office sooner if you experience any increased numbness/tingling in the extremities  Miscellaneous:  Maintain use of hip abduction pillow while in bed and sleeping  Apply ice as needed to the right hip

## 2022-10-14 NOTE — CASE MANAGEMENT
Case Management Discharge Planning Note    Patient name Toñito Mccallum  Location /-01 MRN [id number]  : 1960 Date 10/14/2022       Current Admission Date: 10/13/2022  Current Admission Diagnosis:Primary osteoarthritis of right hip   Patient Active Problem List    Diagnosis Date Noted   • Primary osteoarthritis of right hip 10/06/2022   • Continuous opioid dependence ([address] ) 2022   • Chronic pain syndrome 2022   • Hypertension 2022   • Hip pain, right 2022   • Class 2 obesity due to excess calories without serious comorbidity with body mass index (BMI) of 37 0 to 37 9 in adult 2022   • Hammer toe of second toe of right foot 2021   • Preoperative examination 2020   • DDD (degenerative disc disease), lumbar 09/15/2020   • Lumbar radiculopathy 2020   • Tobacco use 07/10/2020   • Health maintenance examination 09/10/2019   • RLS (restless legs syndrome) 04/15/2019   • Nocturia 04/15/2019   • Tendonitis of shoulder, left 2018   • Fatigue 2018   • Elevated rheumatoid factor 2017   • Allergic rhinitis 2017   • Tendinitis of right shoulder 2016   • Dyslipidemia 2015   • Gout 2015   • Acid reflux 2015      LOS (days): 0  Geometric Mean LOS (GMLOS) (days):   Days to GMLOS:     OBJECTIVE:      Current admission status: Outpatient Surgery   Preferred Pharmacy:   [facility] [address] [address]  Phone: [phone] Fax: [phone] [address] [id number]  Phone: [phone] Fax: [phone]    Primary Care Provider: Baldo Jefferson MD    Primary Insurance: BLUE CROSS  Secondary Insurance:     DISCHARGE DETAILS:  CM consult regarding d/c needs  CM reviewed patient needs and d/c dispo with Ortho, rehab, nurse, patient, & spouse    Patient already scheduled for OP therapy per rehab rec  Patient requesting commode for safety in the home  CM completed order via [place] and [facility] patient with [facility] [employer]  Delivery ticket signed by patient/spouse and placed in consignment bin  No other needs reported by patient or treating team   Patient is for d/c home today

## 2022-10-14 NOTE — TELEPHONE ENCOUNTER
Caller: Ciera Ornelas     Doctor: Angélica Riley    Reason for call: Calling to advise oxycodone was filled 7 days ago with another provider  Would like a call for clarification       Call back#: [phone]

## 2022-10-14 NOTE — PLAN OF CARE
Problem: OCCUPATIONAL THERAPY ADULT  Goal: Performs self-care activities at highest level of function for planned discharge setting  See evaluation for individualized goals  Description: Treatment Interventions: ADL retraining, Functional transfer training, Endurance training, Compensatory technique education, Continued evaluation  Equipment Recommended: Bedside commode       See flowsheet documentation for full assessment, interventions and recommendations  Note: Limitation: Decreased ADL status, Decreased endurance, Decreased self-care trans, Decreased high-level ADLs  Prognosis: Good  Assessment: Patient is a 63 y o  male seen for OT evaluation s/p admit to [id number] [place] [address] on 10/13/2022 w/Primary osteoarthritis of right hip  Commorbidities affecting patient's functional performance at time of assessment include:right hip pain, class 2 obesity, hypertension, and chronic pain syndrome, POD #1  right total hip arthroplasty  Orders placed for OT evaluation and treatment  Performed at least two patient identifiers during session including name and wristband  Prior to admission, Patient was independnet with ADLs/ IADLs, PTA  patient was ambulating with no AD, driving and working full time  Patient lives with spouse and family in a 2 story house, [facility]  Personal factors affecting patient at time of initial evaluation include: steps to enter, difficulty performing ADLs and difficulty performing IADLs  Upon evaluation, patient requires supervision and set up assist for UB ADLs, maximal assist for LB ADLs  Occupational performance is affected by the following deficits: degenerative arthritic joint changes, dynamic sit/ stand balance deficit with poor standing tolerance time for self care and functional mobility, decreased activity tolerance, increased pain and orthopedic restrictions    Patient to benefit from continued Occupational Therapy treatment while in the hospital to address deficits as defined above and maximize level of functional independence with ADLs and functional mobility  Occupational Performance areas to address include: bathing/ shower, dressing, toilet hygiene, transfer to all surfaces and functional mobility  From OT standpoint, recommendation at time of d/c would be Home with family support/ OP therapy per Orthopedic Protocol       OT Discharge Recommendation: Home with outpatient rehabilitation

## 2022-10-14 NOTE — PHYSICAL THERAPY NOTE
Physical Therapy Treatment Note    Patient Name: Sanaz Cali    Diagnosis: Primary osteoarthritis of one hip, right [M16 11]  Chronic pain of right hip [M25 551, G17 29]     10/14/22 1233   PT Last Visit   PT Visit Date 10/14/22   Note Type   Note Type Treatment   Pain Assessment   Pain Assessment Tool 0-10   Pain Score 6  (6/10 at rest; 7/10 post mobility)   Pain Location/Orientation Orientation: Right;Location: Hip   Hospital Pain Intervention(s) Repositioned; Ambulation/increased activity   Restrictions/Precautions   Weight Bearing Precautions Per Order Yes   RLE Weight Bearing Per Order WBAT  (per ortho)   Braces or Orthoses Other (Comment)  (hip abduction pillow)   Other Precautions Chair Alarm; Bed Alarm; Fall Risk;Pain;THR   General   Chart Reviewed Yes   Response to Previous Treatment Patient with no complaints from previous session  Family/Caregiver Present Yes  (pt's spouse)   Cognition   Overall Cognitive Status WFL   Arousal/Participation Alert; Responsive; Cooperative   Attention Within functional limits   Orientation Level Oriented X4   Memory Within functional limits   Following Commands Follows all commands and directions without difficulty   Comments Pt agreeable to PT  Subjective   Subjective "I'm ready to go home "   Bed Mobility   Additional Comments Pt was received seated in recliner in NAD  Transfers   Sit to Stand 5  Supervision   Additional items Assist x 1; Armrests; Increased time required;Verbal cues   Stand to Sit 5  Supervision   Additional items Assist x 1; Armrests; Increased time required;Verbal cues   Ambulation/Elevation   Gait pattern Decreased toe off;Decreased heel strike;Decreased hip extension; Short stride;Decreased R stance; Antalgic   Gait Assistance 5  Supervision   Additional items Assist x 1;Verbal cues   Assistive Device Rolling walker   Distance 100 feet   Stair Management Assistance 5  Supervision   Additional items Assist x 1;Verbal cues   Stair Management Technique Step to pattern; Foreward; With walker  (b/l UE support on walker)   Number of Stairs 4  (6 inch practice step)   Balance   Static Sitting Good   Dynamic Sitting Fair +   Static Standing Fair +   Dynamic Standing Fair   Ambulatory Fair   Endurance Deficit   Endurance Deficit Yes   Endurance Deficit Description decreased activity tolerance   Activity Tolerance   Activity Tolerance Patient tolerated treatment well   Nurse Made Aware RN Kamila   Exercises   Heelslides Sitting;10 reps;AAROM; Left;AROM; Right  (long sitting; hip precautions maintained)   Hip Flexion Sitting;10 reps;AROM; Left   Hip Abduction Sitting;20 reps;AROM; Bilateral  (long sitting; hip precautions maintained)   Knee AROM Long Arc Quad Sitting;20 reps;AROM; Bilateral   Ankle Pumps Sitting;20 reps;AROM; Bilateral   Assessment   Prognosis Good   Problem List Decreased strength;Decreased range of motion;Decreased endurance; Impaired balance;Decreased mobility;Orthopedic restrictions;Pain   Assessment Chart reviewed  Patient was received seated in recliner in NAD and agreeable to PT session  Today's PT treatment session consisted of therapeutic activity for facilitation of transitional movements and safe performance of correct technique for sit to stand transfers, therapeutic exercise to increase lower extremity muscle strength, gait training to promote safe and functional ambulation on level surfaces, and stair negotiation to promote increased independence with stair management to enter home  In comparison to the previous session the patient has made progress as evident by ability to ambulate an increased distance with use of RW  Pt demonstrated improved gait mechanics and ability to initiate step through gait pattern  Pt was able to negotiate six inch practice step with bilateral upper extremity support on walker  Pt educated on proper sequencing and verbalized and demonstrated understanding   Pt's wife educated on care transfer technique and verbalized understanding  Pt was provided with total hip precaution handout; pt able to recall hip precautions and maintain throughout the session  Pt tolerated all therapeutic exercise well without complaints  Overall, patient tolerated today's session well and continues to be making progress towards achieving his STG's  Patient's prognosis for achieving their STG's is good as evident by pt's motivation and good family support  PT intervention continues to be appropriate as the patient continues to be limited by pain, decreased right lower extremity range of motion and strength, impaired balance, decreased endurance, gait deviations, and decreased functional mobility  Continue to recommend home with family support and outpatient PT  PT to continue to see patient in order to address the deficits listed above and provide interventions consistent with the POC in order to achieve STG's and optimize the patient's independence with functional mobility  Barriers to Discharge None   Goals   STG Expiration Date 10/23/22   Plan   Treatment/Interventions Functional transfer training;LE strengthening/ROM; Therapeutic exercise;Elevations; Endurance training;Patient/family training;Bed mobility;Gait training;Spoke to nursing;Spoke to advanced practitioner;OT;Family   Progress Progressing toward goals   PT Frequency Twice a day   Recommendation   PT Discharge Recommendation Home with outpatient rehabilitation   AM-PAC Basic Mobility Inpatient   Turning in Bed Without Bedrails 3   Lying on Back to Sitting on Edge of Flat Bed 3   Moving Bed to Chair 3   Standing Up From Chair 3   Walk in Room 3   Climb 3-5 Stairs 3   Basic Mobility Inpatient Raw Score 18   Basic Mobility Standardized Score 41 05   Highest Level Of Mobility   JH-HLM Goal 6: Walk 10 steps or more   JH-HLM Achieved 7: Walk 25 feet or more   Education   Education Provided Mobility training;Home exercise program;Precautions for total hip arthroplasty (ARIA); Assistive device Patient Demonstrates acceptance/verbal understanding;Reinforcement needed   End of Consult   Patient Position at End of Consult Bedside chair;Bed/Chair alarm activated; All needs within reach     Lorrie Crowe, PT, DPT    Time of PT treatment session: [id number]  25 minutes

## 2022-10-14 NOTE — TELEPHONE ENCOUNTER
Called spoke with Ciera relayed message to cancel the order she understood and said she just hung up with some one from our office and was in the process of canceling the order

## 2022-10-14 NOTE — OCCUPATIONAL THERAPY NOTE
Occupational Therapy Evaluation        Patient Name: Chun HUGO Date: 10/14/2022         10/14/22 0901   OT Last Visit   OT Visit Date 10/14/22   Note Type   Note type Evaluation   Pain Assessment   Pain Assessment Tool 0-10   Pain Score 6   Pain Location/Orientation Orientation: Right;Location: Hip   Hospital Pain Intervention(s) Repositioned; Ambulation/increased activity   Restrictions/Precautions   Weight Bearing Precautions Per Order Yes   RLE Weight Bearing Per Order WBAT  (per ortho)   Braces or Orthoses Other (Comment)  (hip abduction pillow)   Other Precautions Chair Alarm; Bed Alarm   Home Living   Type of [address] [place] [address] Two level; Able to live on main level with bedroom/bathroom;Stairs to enter without rails  (3 TATI)   Bathroom Shower/Tub Walk-in shower   Bathroom Toilet Standard   Bathroom Equipment Grab bars in [facility] [address] Walker;Cane   Additional Comments ambulatory with no AD   Prior Function   Level of [place] Independent with functional mobility; Independent with ADLs; Independent with IADLS   Lives With Spouse; Son   [place] Help From Family   IADLs Independent with driving   Falls in the last 6 months 0   Vocational Full time employment   Lifestyle   Autonomy Patient was independnet with ADLs/ IADLs, PTA  patient was ambulating with no AD, driving and working full time  Patient lives with spouse and family in a 2 story house, 3 TATI     Reciprocal Relationships Supportive family   Service to Others Full time employment at [address] 7  Independent   Grooming Assistance 5  [address] [facility] 5  [address] [facility] 2  Maximal [address] 5  Supervision/Setup    [facility] 2  Maximal [address]  3  Moderate [address] 3  Moderate Assistance   Bed Mobility   Supine to Sit 4 Minimal assistance   Additional items Assist x 1;HOB elevated; Bedrails; Increased time required;Verbal cues;LE management   Transfers   Sit to Stand 5  Supervision   Additional items Assist x 1; Increased time required;Verbal cues;Armrests   Stand to Sit 5  Supervision   Additional items Assist x 1; Increased time required;Verbal cues   Balance   Static Sitting Good   Dynamic Sitting Fair +   Static Standing Fair   Dynamic Standing Fair   Activity Tolerance   Activity Tolerance Patient limited by fatigue;Patient limited by pain   RUE Assessment   RUE Assessment WFL   LUE Assessment   LUE Assessment WFL   Hand Function   Gross Motor Coordination Functional   Fine Motor Coordination Functional   Sensation   Light Touch No apparent deficits  (BUEs)   Psychosocial   Psychosocial (WDL) WDL   Cognition   Overall Cognitive Status WFL   Arousal/Participation Alert; Responsive; Cooperative   Attention Within functional limits   Orientation Level Oriented to person;Oriented to place;Oriented to situation   Memory Within functional limits   Following Commands Follows all commands and directions without difficulty   Assessment   Limitation Decreased ADL status; Decreased endurance;Decreased self-care trans;Decreased high-level ADLs   Prognosis Good   Assessment Patient is a 63 y o  male seen for OT evaluation s/p admit to [address] [facility] on 10/13/2022 w/Primary osteoarthritis of right hip  Commorbidities affecting patient's functional performance at time of assessment include:right hip pain, class 2 obesity, hypertension, and chronic pain syndrome, POD #1  right total hip arthroplasty  Orders placed for OT evaluation and treatment  Performed at least two patient identifiers during session including name and wristband  Prior to admission, Patient was independnet with ADLs/ IADLs, PTA  patient was ambulating with no AD, driving and working full time  Patient lives with spouse and family in a 2 story house, 3 [facility] factors affecting patient at time of initial evaluation include: steps to enter, difficulty performing ADLs and difficulty performing IADLs  Upon evaluation, patient requires supervision and set up assist for UB ADLs, maximal assist for LB ADLs  Occupational performance is affected by the following deficits: degenerative arthritic joint changes, dynamic sit/ stand balance deficit with poor standing tolerance time for self care and functional mobility, decreased activity tolerance, increased pain and orthopedic restrictions  Patient to benefit from continued Occupational Therapy treatment while in the hospital to address deficits as defined above and maximize level of functional independence with ADLs and functional mobility  Occupational Performance areas to address include: bathing/ shower, dressing, toilet hygiene, transfer to all surfaces and functional mobility  From OT standpoint, recommendation at time of d/c would be Home with family support/ OP therapy per Orthopedic Protocol  Goals   Patient Goals "to go home today"   Plan   Treatment Interventions ADL retraining;Functional transfer training; Endurance training; Compensatory technique education;Continued evaluation   Goal Expiration Date 10/14/22   OT Frequency 1-2x/wk   Recommendation   OT Discharge Recommendation Home with outpatient rehabilitation   Equipment Recommended Bedside commode   AM-PAC Daily Activity Inpatient   Lower Body Dressing 2   Bathing 2   Toileting 3   Upper Body Dressing 4   Grooming 4   Eating 4   Daily Activity Raw Score 19   Daily Activity Standardized Score (Calc for Raw Score >=11) 40 22   AM-PAC Applied Cognition Inpatient   Following a Speech/Presentation 4   Understanding Ordinary Conversation 4   Taking Medications 4   Remembering Where Things Are Placed or Put Away 4   Remembering List of 4-5 Errands 4   Taking Care of Complicated Tasks 4   Applied Cognition Raw Score 24   Applied Cognition Standardized Score 62 21 Barthel Index   Feeding 10   Bathing 0   Grooming Score 5   Dressing Score 5   Bladder Score 0   Bowels Score 10   Toilet Use Score 5   Transfers (Bed/Chair) Score 10   Mobility (Level Surface) Score 0   Stairs Score 0   Barthel Index Score 45           Occupational therapy Goals: In 7-10 days    Patient will verbalize/ demonstrate joint safety precautions during functional activity  with No verbal cues    Patient will demonstrate correct of  use of long handle equipment to complete LB ADLs @ Mod I  level  Patient will increase standing tolerance time to 5 minutes with  Unilateral UE support to complete sink level ADLs @  Mod I  level    Patient will restore  independence in bed mobility using Log Rolling technique to transfer OOB at Mod I  level  Patient will verbalize 3  safety awareness/ body mechanics principles to  prevent falls in the home setting  Patient will complete UB/LB ADLs @ Mod I level  Patient will complete light meal prep @ Mod I level  Patient will complete transfers to all surfaces @ Mod I level with AD as indicated

## 2022-10-14 NOTE — DISCHARGE SUMMARY
ORTHOPEDICS DISCHARGE SUMMARY   Anita Cali 63 y o  male MRN: [id number]  Unit/Bed#: -01      Attending Physician: Dr Alva Nichole     Admitting diagnosis: Primary osteoarthritis of one hip, right [M16 11]  Chronic pain of right hip [M25 551, G89 29]    Discharge diagnosis: Primary osteoarthritis of one hip, right [M16 11]  Chronic pain of right hip [M25 551, G89 29]    Date of admission: 10/13/2022    Date of discharge: 10/14/22    Procedure: Right total hip arthroplasty     HPI  This is a 63y o  year old male that presented to the office with signs and symptoms of right hip osteoarthritis  They tried and failed conservative treatment measures and wished to proceed with surgical intervention  The risks, benefits, and complications of the procedure were discussed with the patient and informed consent was obtained  Hospital Course: The patient was admitted to the hospital on 10/13/2022 and underwent an uncomplicated right total hip arthroplasty  They were transferred to the floor after a brief stay in the post-anesthesia care unit  Their pain was well managed with IV and oral pain medications  They began therapy on post operative day #1  Lovenox 40mg was also started for DVT prophylaxis 12 hours post op  On discharge date pt was cleared by PT and the medicine team and determined to be safe for discharge  Daily discussion was had with the patient, nursing staff, orthopaedic team, and family members if present  All questions were answered to the patients satisifaction  0   Lab Value Date/Time    HGB 12 3 10/14/2022 0502    HGB 15 1 09/15/2022 0737    HGB 14 8 03/22/2022 0709    HGB 15 6 06/14/2021 0808    HGB 15 3 09/18/2019 0715     Greater than 2 gram drop which qualifies for diagnosis of acute blood loss anemia  Vital signs remained stable and pt was resuscitated with IVF as needed   Body mass index is 35 71 kg/m²  mildly obese  Recommend behavior modifications  Discharge Instructions:   The patient was discharged weight bearing as tolerated to the right lower extremity  Aspirin 81mg twice daily will be continued for 28 days  Continue PT/OT  Take pain medications as instructed  Maintain posterior hip precautions at all times and use of hip abduction pillow while sleeping until told to discontinue by orthopedics  Discharge Medications: For the complete list of discharge medications, please refer to the patient's medication reconciliation

## 2022-10-14 NOTE — PROGRESS NOTES
Orthopedics   Michelle Cantrell 63 y o  male MRN: [id number]  Unit/Bed#: -01      Subjective:  63 y o male post operative day# 1 s/p right total hip arthroplasty  Patient is sitting comfortably in hospital chair in no acute distress  Patient reports some pain at incision site  Patient denies any groin pain  Patient denies any radiation of pain down right lower extremity  Patient denies any numbness or tingling in the right lower extremity  Patient is complaint with Lovenox injection  Patient utilized hip abduction pillow last night  Patient reports he worked with physical therapy this morning and is ready to go home       Labs:  0   Lab Value Date/Time    HCT 36 3 (L) 10/14/2022 0502    HCT 44 5 09/15/2022 0737    HCT 43 6 03/22/2022 0709    HGB 12 3 10/14/2022 0502    HGB 15 1 09/15/2022 0737    HGB 14 8 03/22/2022 0709    WBC 19 91 (H) 10/14/2022 0502    WBC 8 08 09/15/2022 0737    WBC 6 70 03/22/2022 0709       Meds:    Current Facility-Administered Medications:   •  acetaminophen (TYLENOL) tablet 650 mg, 650 mg, Oral, Q6H PRN, Naheed Novoa MD, 650 mg at 10/14/22 1236  •  aluminum-magnesium hydroxide-simethicone (MYLANTA) oral suspension 30 mL, 30 mL, Oral, Q6H PRN, Naheed Novoa MD  •  ascorbic acid (VITAMIN C) tablet 500 mg, 500 mg, Oral, BID, Naheed Novoa MD, 500 mg at 10/14/22 1001  •  bisacodyl (DULCOLAX) rectal suppository 10 mg, 10 mg, Rectal, Daily PRN, Naheed Novoa MD  •  colchicine (COLCRYS) tablet 0 6 mg, 0 6 mg, Oral, BID, Naheed Novoa MD, 0 6 mg at 10/14/22 1001  •  docusate sodium (COLACE) capsule 100 mg, 100 mg, Oral, BID, Naheed Novoa MD, 100 mg at 10/14/22 1001  •  enoxaparin (LOVENOX) subcutaneous injection 40 mg, 40 mg, Subcutaneous, Daily, Julio Salmon MD, 40 mg at 10/14/22 9059  •  fentanyl citrate (PF) 100 MCG/2ML 25 mcg, 25 mcg, Intravenous, Q2H PRN, Naheed Novoa MD  •  ferrous sulfate tablet 325 mg, 325 mg, Oral, BID With Meals, Naheed Novoa MD, 325 mg at 36/93/77 3853  •  folic acid (FOLVITE) tablet 1 mg, 1 mg, Oral, Daily, Aidan Enrique MD, 1 mg at 10/14/22 1001  •  gabapentin (NEURONTIN) capsule 300 mg, 300 mg, Oral, HS, Aidan Enrique MD, 300 mg at 10/13/22 2213  •  lactated ringers bolus 1,000 mL, 1,000 mL, Intravenous, Once PRN **AND** lactated ringers bolus 1,000 mL, 1,000 mL, Intravenous, Once PRN, Aidan Enrique MD  •  lactated ringers infusion, 140 mL/hr, Intravenous, Continuous, Aidan Enrique MD, Last Rate: 140 mL/hr at 10/14/22 0700, 140 mL/hr at 10/14/22 0700  •  lisinopril (ZESTRIL) tablet 20 mg, 20 mg, Oral, Daily, Aidan Enrique MD, 20 mg at 10/14/22 1001  •  loratadine (CLARITIN) tablet 10 mg, 10 mg, Oral, Daily, Aidan Enrique MD, 10 mg at 10/14/22 1001  •  melatonin tablet 3 mg, 3 mg, Oral, HS, Aidan Enrique MD, 3 mg at 10/13/22 2213  •  multivitamin-minerals (CENTRUM) tablet 1 tablet, 1 tablet, Oral, Daily, Aidan Enrique MD, 1 tablet at 10/14/22 1010  •  nicotine (NICODERM CQ) 14 mg/24hr TD 24 hr patch 14 mg, 14 mg, Transdermal, Daily, Aidan Enrique MD  •  ondansetron (ZOFRAN) injection 4 mg, 4 mg, Intravenous, Q6H PRN, Aidan Enrique MD  •  oxyCODONE (ROXICODONE) immediate release tablet 10 mg, 10 mg, Oral, Q4H PRN, Aidan Enrique MD, 10 mg at 10/14/22 1019  •  oxyCODONE (ROXICODONE) IR tablet 15 mg, 15 mg, Oral, Q4H PRN, Aidan Enrique MD, 15 mg at 10/14/22 0513  •  pantoprazole (PROTONIX) EC tablet 40 mg, 40 mg, Oral, Early Morning, Aidan Enrique MD, 40 mg at 10/14/22 4367  •  rOPINIRole (REQUIP) tablet 1 mg, 1 mg, Oral, HS, Aidan Enrique MD, 1 mg at 10/13/22 2822  •  sodium chloride 0 9 % bolus 1,000 mL, 1,000 mL, Intravenous, Once PRN **AND** sodium chloride 0 9 % bolus 1,000 mL, 1,000 mL, Intravenous, Once PRN, Aidan Enrique MD    Blood Culture:   No results found for: BLOODCX    Wound Culture:   No results found for: WOUNDCULT    Ins and Outs:  I/O last 24 hours: In: 5 [P O :820;  I V :3800; IV [id number]:288]  Out: 3050 [Urine:2900; Blood:150]          Physical Exam:  Vitals:    10/14/22 1100   BP: 113/67   Pulse: 78   Resp:    Temp:    SpO2: 93%     right lower extremity:  · Patient is a 31-year-old male sitting comfortably in hospital chair in no acute distress  · Mepilex dressing clean dry and intact without soilage or strike through present  · Mild TTP over incision  No other bony or soft tissue tenderness to palpation appreciated  · Sensation intact L2-S1  · Motor intact L2-S1  · 5/5 motor strength with ankle dorsi/plantar flexion and EHL/FHL  · 2+ dorsalis pedis  · Calf supple and nonteder     _*_*_*_*_*_*_*_*_*_*_*_*_*_*_*_*_*_*_*_*_*_*_*_*_*_*_*_*_*_*_*_*_*_*_*_*_*_*_*_*_*    Assessment: 62 y o male post operative day #1 s/p right total hip arthroplasty  Doing well    Plan:  · Weight Bearing as tolerated right lower extremity   · Up and out of bed  · Posterior total hip precautions  · Abduction pillow while in bed and sleeping  · DVT prophylaxis: Lovenox 40mg SC  · Analgesics  · PT/OT  · Patient noted to have acute blood loss anemia due to a drop in Hbg of > 2 0g from preop levels, will monitor vital signs and resuscitate with IV fluids as needed   · Dispo: Patient is doing well s/p surgical intervention  Maintain use of hip abduction while sleeping and posterior hip precautions at all times  Discharge planning pending PT/OT clearance      Tania Acuna PA-C

## 2022-10-14 NOTE — OCCUPATIONAL THERAPY NOTE
Occupational Therapy treatment Note        Patient Name: Chun Belle  LRBWA'V Date: 10/14/2022           10/14/22 1102   OT Last Visit   OT Visit Date 10/14/22   Note Type   Note Type Treatment   Pain Assessment   Pain Assessment Tool 0-10   Pain Score 5   Pain Location/Orientation Orientation: Right;Location: Hip   Hospital Pain Intervention(s) Repositioned   Restrictions/Precautions   RLE Weight Bearing Per Order WBAT   Other Precautions Chair Alarm; Bed Alarm; Fall Risk;Pain   ADL   Where Assessed Chair   LB Dressing Assistance 4  Minimal Assistance   LB Dressing Deficit Requires assistive device for steadying;Use of adaptive equipment   Cognition   Overall Cognitive Status [facility]   Arousal/Participation Alert; Responsive; Cooperative   Attention Within functional limits   Orientation Level Oriented X4   Memory Within functional limits   Following Commands Follows all commands and directions without difficulty   Additional Activities   Additional Activities Other (Comment)  (Long handle equipment)   Additional Activities Comments Patient and wife were educated in the use of long handle equipment to achieve independnece in LB ADLs while maintaining posterior hip precautions  Both patient and wife verbalized understanding, patient provided return demonstrattion correctly utilizing sock aid, and dressing stick  Long handle equipment issued to patient for d/c, bedside commode requested from Care management  Activity Tolerance   Activity Tolerance Patient limited by pain   Assessment   Assessment Patient participated in Skilled OT session this date with interventions consisting of ADL re training with the use of correct body mechnaics, safety awareness and fall prevention techniques,  long handle equipment, maintaining Total Hip precautions and maintaining weight bearing restrictions    Patient agreeable to OT treatment session, upon arrival patient was found seated OOB to Recliner, alert, responsive  and in no apparent distress  Patient and wife were educated in the use of long handle equipment to achieve independnece in LB ADLs while maintaining posterior hip precautions  Both patient and wife verbalized understanding, patient provided return demonstrattion correctly utilizing sock aid, and dressing stick  Long handle equipment issued to patient for d/c, bedside commode requested from Care management  From OT standpoint, recommendation at time of d/c would be Home with family support and OP therapy per orthopedic protocol  Patient to benefit from continued Occupational Therapy treatment while in the hospital to address deficits as defined above and maximize level of functional independence with ADLs and functional mobility  Plan   Treatment Interventions ADL retraining;Functional transfer training; Endurance training;Patient/family training;Equipment evaluation/education; Compensatory technique education;Continued evaluation   Goal Expiration Date 10/14/22   OT Frequency 1-2x/wk   Recommendation   OT Discharge Recommendation Home with outpatient rehabilitation   Equipment Recommended Bedside commode   AM-PAC Daily Activity Inpatient   Lower Body Dressing 3   Bathing 3   Toileting 3   Upper Body Dressing 4   Grooming 4   Eating 4   Daily Activity Raw Score 21   Daily Activity Standardized Score (Calc for Raw Score >=11) 44 27   AM-PAC Applied Cognition Inpatient   Following a Speech/Presentation 4   Understanding Ordinary Conversation 4   Taking Medications 4   Remembering Where Things Are Placed or Put Away 4   Remembering List of 4-5 Errands 4   Taking Care of Complicated Tasks 4   Applied Cognition Raw Score 24   Applied Cognition Standardized Score 62 21

## 2022-10-17 ENCOUNTER — TELEPHONE (OUTPATIENT)
Dept: OBGYN CLINIC | Facility: HOSPITAL | Age: 62
End: 2022-10-17

## 2022-10-17 ENCOUNTER — OFFICE VISIT (OUTPATIENT)
Dept: PHYSICAL THERAPY | Facility: CLINIC | Age: 62
End: 2022-10-17
Payer: COMMERCIAL

## 2022-10-17 DIAGNOSIS — M10.9 GOUT, UNSPECIFIED CAUSE, UNSPECIFIED CHRONICITY, UNSPECIFIED SITE: ICD-10-CM

## 2022-10-17 DIAGNOSIS — G89.29 CHRONIC PAIN OF RIGHT HIP: ICD-10-CM

## 2022-10-17 DIAGNOSIS — M25.551 CHRONIC PAIN OF RIGHT HIP: ICD-10-CM

## 2022-10-17 DIAGNOSIS — Z96.641 S/P TOTAL RIGHT HIP ARTHROPLASTY: Primary | ICD-10-CM

## 2022-10-17 DIAGNOSIS — M16.11 PRIMARY OSTEOARTHRITIS OF ONE HIP, RIGHT: ICD-10-CM

## 2022-10-17 LAB
DME PARACHUTE DELIVERY DATE ACTUAL: NORMAL
DME PARACHUTE DELIVERY DATE REQUESTED: NORMAL
DME PARACHUTE ITEM DESCRIPTION: NORMAL
DME PARACHUTE ORDER STATUS: NORMAL
DME PARACHUTE SUPPLIER NAME: NORMAL
DME PARACHUTE SUPPLIER PHONE: NORMAL

## 2022-10-17 PROCEDURE — 97140 MANUAL THERAPY 1/> REGIONS: CPT

## 2022-10-17 PROCEDURE — 97164 PT RE-EVAL EST PLAN CARE: CPT

## 2022-10-17 PROCEDURE — 97110 THERAPEUTIC EXERCISES: CPT

## 2022-10-17 RX ORDER — COLCHICINE 0.6 MG/1
TABLET ORAL
Qty: 200 TABLET | Refills: 3 | Status: SHIPPED | OUTPATIENT
Start: 2022-10-17

## 2022-10-17 NOTE — PROGRESS NOTES
PT Re-Evaluation     Today's date: 10/17/2022  Patient name: Toñito Mccallum  : 1960  MRN: [id number]  Referring provider: Keiry North MD  Dx:   Encounter Diagnosis     ICD-10-CM    1  S/P total right hip arthroplasty  Z96 641    2  Primary osteoarthritis of one hip, right  M16 11    3  Chronic pain of right hip  M25 551     G89 29                   Assessment  Assessment details: Toñito Mccallum is a 63 y o  male presenting to physical therapy for a initial evaluation as he underwent a R ARIA on 10/13/22  The patient demonstrates decreased right hip ROM, decreased hip strength, and increased pain limiting his tolerance for prolonged weightbearing activities  The patient demonstrates an antalgic gait pattern with use of a rolling walker  These deficits have limited the patient's ability to complete ADLs, vocational responsibilities, and recreational activities  This patient would benefit from OP PT services to address their impairments and functional limitations to maximize functional mobility  The patient and his wife were educated on bed mobility mechanics, hip precautions for his bed and on the couch, and reviewed/demonstrated understanding current HEP  All questions were answered at end of session  Impairments: abnormal gait, abnormal or restricted ROM, activity intolerance, impaired balance, impaired physical strength, lacks appropriate home exercise program, pain with function and weight-bearing intolerance    Symptom irritability: moderateUnderstanding of Dx/Px/POC: excellent   Prognosis: good    Goals  STG to be achieved in 4 weeks: ALL NOT MET- PROGRESSING  The patient will report a decrease in right hip "at worst" subjective pain rating score to at least 5/10 to allow for improved tolerance for weightbearing activities  The patient will increase right hip flexion AROM to at least 60 degrees to allow for improved functional mobility     The patient will increase right hip flexion MMT score to at least 4/5 to allow for improved functional mobility  LTG to be achieved by DC: ALL NOT MET- PROGRESSING  The patient will be independent in comprehensive HEP  The patient will report no pain with usual activities  The patient will improve right hip AROM to [facility] to allow for improved functional mobility  The patient will increase right hip MMT score to [facility] to allow for improved functional mobility  The patient will be able to ambulate one mile without pain or difficulty to allow for improved functional mobility and return to work  The patient will be able to return to squatting and lifting 60lbs without pain or difficulty to allow for return to work  The patient will be able to stand for >4 hours at a time without pain or difficulty to allow for return to work  The patient will improve TUG and 5 STS functional assessments <10 seconds demonstrating improved functional mobility and hip strength  Plan  Patient would benefit from: skilled physical therapy  Planned modality interventions: thermotherapy: hydrocollator packs, TENS and cryotherapy  Planned therapy interventions: manual therapy, joint mobilization, balance/weight bearing training, neuromuscular re-education, patient education, flexibility, strengthening, stretching, therapeutic activities, therapeutic exercise, gait training and home exercise program  Frequency: 2x week  Duration in weeks: 12  Plan of Care beginning date: 10/3/2022  Plan of Care expiration date: 12/26/2022  Treatment plan discussed with: patient        Subjective Evaluation    History of Present Illness  Mechanism of injury: Amrit Brown presents to therapy post operatively as he underwent R ARIA on 10/13/22  Patient reports that post operatively things are going fairly well  He reports that he has been walking with the rolling walker without issue or feelings instability  He reports that the morning after he was DC home from the hospital he was able to get into his shower   He reports that he has a handicap shower with grab bars and he was able to shower with assistance with his wife  He reports that he had to modify the elevated toilet seat to adjust to his height, however he states that it works well for him now that him and his wife were able to fix it  He reports that he has been able to get in and out of his spare bedroom without issue, however he has questions about getting out of bed on his side  He reports that pain has largely improved from prior to surgery with 6/10 pain located on incision area  He reports that he has been keeping up his pain medications as instructed and prescribed as well as utilizing an ice pack  He reports that he took his gout medication last night as he was having some knee pain and this medication helped to alleviate these symptoms  He states that he has been completing his HEP 2x per day  First post operative follow up with surgeon is 10/21/22  Pain  Current pain ratin  At best pain ratin  At worst pain ratin  Location: R posterior hip/ incision area   Quality: sharp and dull ache  Relieving factors: change in position, ice and relaxation  Aggravating factors: standing, walking, stair climbing and lifting  Progression: improved    Social Support  Steps to enter house: yes  Stairs in house: yes   Lives in: multiple-level home  Lives with: spouse    Employment status: not working (Out of work at this time- [employer] work)  Treatments  Previous treatment: injection treatment and medication  Current treatment: physical therapy  Patient Goals  Patient goals for therapy: decreased pain, increased motion, increased strength, return to work and independence with ADLs/IADLs  Patient goal: Get better and return to work         Objective     Observations     Additional Observation Details  Incision at right posterior lateral hip covered with post operative dressing   No redness evident, minor swelling and bruising present     Palpation     Right Tenderness of the distal biceps femoris, distal semimembranosus, distal semitendinosus, lateral gastrocnemius and medial gastrocnemius  Active Range of Motion   Left Hip   Flexion: 89 degrees   Abduction: 25 degrees   External rotation (90/90): 23 degrees   Internal rotation (90/90): 15 degrees     Right Hip   Flexion: 52 degrees with pain  Abduction: 17 degrees with pain  External rotation (90/90): 8 degrees with pain  Internal rotation (90/90): 10 degrees with pain    Passive Range of Motion     Right Hip   Flexion: 65 degrees with pain  Abduction: 21 degrees with pain  External rotation (90/90): 10 degrees with pain  Internal rotation (90/90): 10 degrees with pain    Strength/Myotome Testing     Left Hip   Planes of Motion   Flexion: 4-  Abduction: 4+  External rotation: 4+  Internal rotation: 4+    Right Hip   Planes of Motion   Flexion: 3  Abduction: 3+  External rotation: 3-  Internal rotation: 3-    Ambulation     Ambulation: Level Surfaces   Ambulation with assistive device: independent    Observational Gait     Additional Observational Gait Details  Antalgic with step through gait pattern, use of RW    Functional Assessment        Comments  5 STS- 27 31 seconds, B/L UE assistance needed for push off and eccentric control to sit with R LE kicked out front, rolling walker in front of patient  TUG- 31 14 seconds, B/L UE assistance needed for push off and eccentric control to sit, with R LE kicked out front, antalgic step through gait pattern with rolling walker                Precautions: HTN, gout   R ARIA 10/13/22 Posterior Hip Precautions  Access Code: [email or web address]       Manuals 10/3 10/17           R hip PROM- Post hip precautions  BE           R hip flexor, adductor, hamstring stretch                                       Neuro Re-Ed             Glute sets HEP reviewed           Glute set with bridge             Supine hip abd with TB                                                                 Ther Ex Nustep- hip flex <90*             Supine heel slides HEP reviewed           Supine hip abd slides  HEP reviewed           Supine hip add isometric             Standing hip 3 ways             Supine SLR             Standing hamstring stretch at step- hip flex <90*  reviewed                        Pt education PT POC, HEP, post op statusand expectations Post op status, precautions, HEP                         Ther Activity             TG squats             Fwd step ups             Lateral step ups              Gait Training                                       Modalities

## 2022-10-19 ENCOUNTER — TELEPHONE (OUTPATIENT)
Dept: OBGYN CLINIC | Facility: HOSPITAL | Age: 62
End: 2022-10-19

## 2022-10-20 ENCOUNTER — OFFICE VISIT (OUTPATIENT)
Dept: PHYSICAL THERAPY | Facility: CLINIC | Age: 62
End: 2022-10-20
Payer: COMMERCIAL

## 2022-10-20 DIAGNOSIS — G89.29 CHRONIC PAIN OF RIGHT HIP: ICD-10-CM

## 2022-10-20 DIAGNOSIS — M25.551 CHRONIC PAIN OF RIGHT HIP: ICD-10-CM

## 2022-10-20 DIAGNOSIS — M16.11 PRIMARY OSTEOARTHRITIS OF ONE HIP, RIGHT: ICD-10-CM

## 2022-10-20 DIAGNOSIS — Z96.641 S/P TOTAL RIGHT HIP ARTHROPLASTY: Primary | ICD-10-CM

## 2022-10-20 PROCEDURE — 97110 THERAPEUTIC EXERCISES: CPT

## 2022-10-20 PROCEDURE — 97112 NEUROMUSCULAR REEDUCATION: CPT

## 2022-10-20 PROCEDURE — 97140 MANUAL THERAPY 1/> REGIONS: CPT

## 2022-10-20 NOTE — PROGRESS NOTES
Daily Note     Today's date: 10/20/2022  Patient name: Gisell Augustine  : 1960  MRN: [id number]  Referring provider: Noemí Guaman MD  Dx:   Encounter Diagnosis     ICD-10-CM    1  S/P total right hip arthroplasty  Z96 641    2  Primary osteoarthritis of one hip, right  M16 11    3  Chronic pain of right hip  M25 551     G89 29                   Subjective: Patient reports that his hip has been feeling fairly good  Notes some soreness in his knee which he thinks might be from all of the walking his did outside on his sidewalk yesterday  Objective: See treatment diary below      Assessment: Initiated treatment session as outlined below; tolerated treatment well  Patient able to complete all exercises without increased pain, however noted muscular fatigue with bridges and supine hip abduction in hooklying  Educated on DOMS, verbalized understanding  Patient demonstrated fatigue post treatment, exhibited good technique with therapeutic exercises and would benefit from continued PT      Plan: Continue per plan of care  Progress treatment as tolerated  Precautions: HTN, gout   R ARIA 10/13/22 Posterior Hip Precautions  Access Code: [email or web address]       Manuals 10/3 10/17 10/20          R hip PROM- Post hip precautions  BE BE          R hip flexor, adductor, hamstring stretch   BE                                    Neuro Re-Ed             Glute sets HEP reviewed           Glute set with bridge   5" 2x10          Supine hip abd with TB in hooklying   GTB 5" 2x10                                                              Ther Ex             Nustep- hip flex <90*   Seat 13   L3 10'          Supine heel slides HEP reviewed           Supine hip abd slides  HEP reviewed x20           Supine hip add isometric             Standing hip 3 ways   x10 ea dir           Supine SLR             Standing hamstring stretch at step- hip flex <90*  reviewed 3x30"                       Pt education PT POC, HEP, post op statusand expectations Post op status, precautions, HEP  DOMS                       Ther Activity             TG squats             Fwd step ups             Lateral step ups              Gait Training                                       Modalities

## 2022-10-21 ENCOUNTER — APPOINTMENT (OUTPATIENT)
Dept: RADIOLOGY | Facility: CLINIC | Age: 62
End: 2022-10-21
Payer: COMMERCIAL

## 2022-10-21 ENCOUNTER — OFFICE VISIT (OUTPATIENT)
Dept: OBGYN CLINIC | Facility: CLINIC | Age: 62
End: 2022-10-21

## 2022-10-21 VITALS
OXYGEN SATURATION: 98 % | SYSTOLIC BLOOD PRESSURE: 112 MMHG | BODY MASS INDEX: 36.08 KG/M2 | RESPIRATION RATE: 18 BRPM | DIASTOLIC BLOOD PRESSURE: 74 MMHG | HEIGHT: 70 IN | WEIGHT: 252 LBS | HEART RATE: 103 BPM

## 2022-10-21 DIAGNOSIS — Z96.642 STATUS POST TOTAL HIP REPLACEMENT, LEFT: ICD-10-CM

## 2022-10-21 DIAGNOSIS — Z48.89 AFTERCARE FOLLOWING SURGERY: Primary | ICD-10-CM

## 2022-10-21 DIAGNOSIS — Z48.89 AFTERCARE FOLLOWING SURGERY: ICD-10-CM

## 2022-10-21 PROCEDURE — 73502 X-RAY EXAM HIP UNI 2-3 VIEWS: CPT

## 2022-10-21 PROCEDURE — 99024 POSTOP FOLLOW-UP VISIT: CPT | Performed by: ORTHOPAEDIC SURGERY

## 2022-10-21 NOTE — PATIENT INSTRUCTIONS
- weight-bearing as tolerated right lower extremity  - continue posterior precautions as directed    - continue physical therapy as directed  - continue aspirin for anticoagulation as directed  - Over the counter analgesics as needed / directed   - Ice / heat as directed   - Follow up 1 week for suture removal

## 2022-10-21 NOTE — PROGRESS NOTES
HPI:  Patient is a 63y o  year old POD#8 right total hip arthroplasty  DOS 10/13/22  Patient states that hip is doing well overall  Patient states he has minimal pain in the hip currently  Patient states that his main complaint is over the incision site  Patient states he has been compliant anticoagulation with aspirin  Patient denies any shortness of breath chest tightness chest pain  Patient denies any calf pain calf tenderness  Patient denies any fevers any chills  Patient states he has been participating in physical therapy  Patient offers no other complaints at this time        ROS:   General: No fever, no chills, no weight loss, no weight gain  HEENT:  No loss of hearing, no nose bleeds, no sore throat  Eyes:  No eye pain, no red eyes, no visual disturbance  Respiratory:  No cough, no shortness of breath, no wheezing  Cardiovascular:  No chest pain, no palpitations, no edema  GI: No abdominal pain, no nausea, no vomiting  Endocrine: No frequent urination, no excessive thirst  Urinary:  No dysuria, no hematuria, no incontinence  Musculoskeletal: see HPI and PE  Skin:  No rash, no wounds  Neurological:  No dizziness, no headache, no numbness  Psychiatric:  No difficulty concentrating, no depression, no suicide thoughts, no anxiety  Review of all other systems is negative    PMH:  Past Medical History:   Diagnosis Date   • Arthritis    • Gout    • Hypertension        PSH:  Past Surgical History:   Procedure Laterality Date   • BACK SURGERY      Discs   • CATARACT EXTRACTION     • KNEE SURGERY     • NM TOTAL HIP ARTHROPLASTY Right 10/13/2022    Procedure: ARTHROPLASTY HIP TOTAL;  Surgeon: Jacob Barros MD;  Location: [facility];  Service: Orthopedics   • ROTATOR CUFF REPAIR         Medications:  Current Outpatient Medications   Medication Sig Dispense Refill   • acetaminophen (TYLENOL) 325 mg tablet Take 2 tablets (650 mg total) by mouth every 6 (six) hours as needed for mild pain 90 tablet 0   • ascorbic acid (VITAMIN C) 500 MG tablet Take 1 tablet (500 mg total) by mouth 2 (two) times a day 60 tablet 1   • aspirin (ECOTRIN LOW STRENGTH) 81 mg EC tablet Take 1 tablet (81 mg total) by mouth 2 (two) times a day 56 tablet 0   • colchicine (COLCRYS) 0 6 mg tablet TAKE 1 TABLET TWICE A DAY AND EVERY 3 HOURS AS NEEDED FOR GOUT AS DIRECTED 200 tablet 3   • docusate sodium (COLACE) 100 mg capsule Take 1 capsule (100 mg total) by mouth 2 (two) times a day as needed for constipation 20 capsule 0   • ferrous sulfate 324 (65 Fe) mg Take 1 tablet (324 mg total) by mouth 2 (two) times a day before meals 60 tablet 1   • folic acid (FOLVITE) 1 mg tablet Take 1 tablet (1 mg total) by mouth daily 30 tablet 1   • indomethacin (INDOCIN) 50 mg capsule Take 1 capsule (50 mg total) by mouth 3 (three) times a day with meals (Patient taking differently: Take 50 mg by mouth 3 (three) times a day as needed) 30 capsule 0   • levocetirizine (XYZAL) 5 MG tablet Take 5 mg by mouth every evening      • lisinopril (ZESTRIL) 20 mg tablet Take 1 tablet (20 mg total) by mouth daily 90 tablet 5   • Melatonin 1 MG CAPS Take 1 mg by mouth in the morning     • Multiple Vitamins-Minerals (multivitamin with minerals) tablet Take 1 tablet by mouth daily 30 tablet 1   • omeprazole (PriLOSEC) 40 MG capsule TAKE 1 CAPSULE DAILY 90 capsule 3   • oxyCODONE (Roxicodone) 5 immediate release tablet Take 1 tablet (5 mg total) by mouth every 4 (four) hours as needed for moderate pain for up to 10 days Max Daily Amount: 30 mg 28 tablet 0   • rOPINIRole (REQUIP) 1 mg tablet TAKE 1 TABLET DAILY AT BEDTIME 90 tablet 3     Current Facility-Administered Medications   Medication Dose Route Frequency Provider Last Rate Last Admin   • cyanocobalamin injection 1,000 mcg  1,000 mcg Intramuscular Q30 Days Abiodun Sevilla MD   1,000 mcg at 05/11/18 1643       Allergies:  No Known Allergies    Family History:  Family History   Problem Relation Age of Onset   • Esophageal cancer Father    • Lung cancer Father    • Pancreatic cancer Father    • Thyroid nodules Father    • Stroke Family        Social History:  Social History     Occupational History     Comment: employed   Tobacco Use   • Smoking status: Current Some Day Smoker     Types: Cigars   • Smokeless tobacco: Never Used   Vaping Use   • Vaping Use: Never used   Substance and Sexual Activity   • Alcohol use: Yes     Alcohol/week: 1 0 standard drink     Types: 1 Cans of beer per week     Comment: occasional   • Drug use: No   • Sexual activity: Yes     Partners: Female       Physical Exam:  General :  Alert, cooperative, no distress, appears stated age  Blood pressure 112/74, pulse 103, resp  rate 18, height 5' 10" (1 778 m), weight 114 kg (252 lb), SpO2 98 %  Head:  Normocephalic, without obvious abnormality, atraumatic   Eyes:  Conjunctiva/corneas clear, EOM's intact,   Ears: Both ears normal appearance, no hearing deficits  Nose: Nares normal, septum midline, no drainage    Neck: Supple,  trachea midline, no adenopathy, no tenderness, no mass   Back:   Symmetric, no curvature, ROM normal, no tenderness   Lungs:   Respirations unlabored   Chest Wall:  No tenderness or deformity   Extremities: Extremities normal, atraumatic, no cyanosis or edema      Pulses: 2+ and symmetric   Skin: Skin color, texture, turgor normal, no rashes or lesions      Neurologic: Normal           Ortho Exam  Right Hip examination:  - Patient sitting comfortably in the office in no acute distress   - incision site healing with no acute visible abnormalities present  Steri-Strips intact with no active drainage present  - mild tenderness palpation noted over incision site  No other bony or soft tissue tenderness to palpation noted at this time   - NV intact        Imaging Studies:   X-ray right hip two views:  Status post right total hip arthroplasty in good alignment    Assessment  Encounter Diagnoses   Name Primary?    • Aftercare following surgery Yes   • Status post total hip replacement, left          Plan:  - weight-bearing as tolerated right lower extremity  - continue posterior precautions as directed    - continue physical therapy as directed  - continue aspirin for anticoagulation as directed  - Over the counter analgesics as needed / directed   - Ice / heat as directed   - Follow up 1 week for suture removal

## 2022-10-24 ENCOUNTER — OFFICE VISIT (OUTPATIENT)
Dept: PHYSICAL THERAPY | Facility: CLINIC | Age: 62
End: 2022-10-24
Payer: COMMERCIAL

## 2022-10-24 DIAGNOSIS — Z96.641 S/P TOTAL RIGHT HIP ARTHROPLASTY: Primary | ICD-10-CM

## 2022-10-24 DIAGNOSIS — M25.551 CHRONIC PAIN OF RIGHT HIP: ICD-10-CM

## 2022-10-24 DIAGNOSIS — M16.11 PRIMARY OSTEOARTHRITIS OF ONE HIP, RIGHT: ICD-10-CM

## 2022-10-24 DIAGNOSIS — G89.29 CHRONIC PAIN OF RIGHT HIP: ICD-10-CM

## 2022-10-24 PROCEDURE — 97112 NEUROMUSCULAR REEDUCATION: CPT

## 2022-10-24 PROCEDURE — 97110 THERAPEUTIC EXERCISES: CPT

## 2022-10-24 PROCEDURE — 97140 MANUAL THERAPY 1/> REGIONS: CPT

## 2022-10-24 NOTE — PROGRESS NOTES
Daily Note     Today's date: 10/24/2022  Patient name: Peg Estimnawaf  : 1960  MRN: [id number]  Referring provider: Cora Johnson MD  Dx:   Encounter Diagnosis     ICD-10-CM    1  S/P total right hip arthroplasty  Z96 641    2  Primary osteoarthritis of one hip, right  M16 11    3  Chronic pain of right hip  M25 551     G89 29                   Subjective: Patient reports that his hip has been feeling good  Notes that he has been taking a few steps around his house without the RW without issue  Denied increased pain after last session, noted soreness  Objective: See treatment diary below      Assessment: Tolerated treatment well  Progressed patient with additional reps for standing hip 3 ways as well as step ups to facilitate improved strength in the right lower extremity functionally  Patient able to complete with minimal discomfort in the hip  Patient demonstrated fatigue post treatment, exhibited good technique with therapeutic exercises and would benefit from continued PT      Plan: Progress treatment as tolerated  Precautions: HTN, gout   R ARIA 10/13/22 Posterior Hip Precautions  Access Code: [email or web address]       Manuals 10/3 10/17 10/20 10/24         R hip PROM- Post hip precautions  BE BE BE         R hip flexor, adductor, hamstring stretch   BE BE                                   Neuro Re-Ed             Glute sets HEP reviewed           Glute set with bridge   5" 2x10 5" 2x10         Supine hip abd with TB in hooklying   GTB 5" 2x10 GTB 5" 2x10                                                             Ther Ex             Nustep- hip flex <90*   Seat 13   L3 10' Seat 13   L5 10'         Supine heel slides HEP reviewed           Supine hip abd slides  HEP reviewed x20  x20         Supine hip add isometric    5" 2x10         Standing hip 3 ways   x10 ea dir  2x10 ea dir          Supine SLR             Standing hamstring stretch at step- hip flex <90*  reviewed 3x30" 3x30" Pt education PT POC, HEP, post op statusand expectations Post op status, precautions, HEP  DOMS                       Ther Activity             TG squats             Fwd step ups    6" 2x10         Lateral step ups     6" 2x10         Gait Training                                       Modalities no

## 2022-10-26 ENCOUNTER — OFFICE VISIT (OUTPATIENT)
Dept: PHYSICAL THERAPY | Facility: CLINIC | Age: 62
End: 2022-10-26
Payer: COMMERCIAL

## 2022-10-26 DIAGNOSIS — Z96.641 S/P TOTAL RIGHT HIP ARTHROPLASTY: Primary | ICD-10-CM

## 2022-10-26 DIAGNOSIS — M25.551 CHRONIC PAIN OF RIGHT HIP: ICD-10-CM

## 2022-10-26 DIAGNOSIS — G89.29 CHRONIC PAIN OF RIGHT HIP: ICD-10-CM

## 2022-10-26 DIAGNOSIS — M16.11 PRIMARY OSTEOARTHRITIS OF ONE HIP, RIGHT: ICD-10-CM

## 2022-10-26 PROCEDURE — 97140 MANUAL THERAPY 1/> REGIONS: CPT

## 2022-10-26 PROCEDURE — 97112 NEUROMUSCULAR REEDUCATION: CPT

## 2022-10-26 PROCEDURE — 97110 THERAPEUTIC EXERCISES: CPT

## 2022-10-26 NOTE — PROGRESS NOTES
Daily Note     Today's date: 10/26/2022  Patient name: Fany Hatch  : 1960  MRN: [id number]  Referring provider: Alison Weber MD  Dx:   Encounter Diagnosis     ICD-10-CM    1  S/P total right hip arthroplasty  Z96 641    2  Primary osteoarthritis of one hip, right  M16 11    3  Chronic pain of right hip  M25 551     G89 29                   Subjective: Pt reports feeling great today  Objective: See treatment diary below      Assessment: Tolerated treatment well  Good technique demonstrated after cueing for amb with SPC  Pt can d/c use of his RW at this point  He tolerates progressions well this visit without complaints  Sidelying SLR was more difficult for pt so only 10 reps were performed, will possibly assist pt NV with these  Pt was educated on [facility] use and side sleeping on L side with use of 1-2 pillows between knees  Patient demonstrated fatigue post treatment, exhibited good technique with therapeutic exercises and would benefit from continued PT      Plan: Continue per plan of care  Precautions: HTN, gout   R ARIA 10/13/22 Posterior Hip Precautions  Access Code: [email or web address]       Manuals 10/3 10/17 10/20 10/24 10/26        R hip PROM- Post hip precautions  BE BE BE KT        R hip flexor, adductor, hamstring stretch   BE BE KT                                  Neuro Re-Ed             Glute sets HEP reviewed           Glute set with bridge   5" 2x10 5" 2x10 5" 2x10        Supine hip abd with TB in hooklying   GTB 5" 2x10 GTB 5" 2x10 GTB 5" 2x10        Sidelying SLR     10x pillow bw knees assist?                                              Ther Ex             Nustep- hip flex <90*   Seat 13   L3 10' Seat 13   L5 10' Seat 13   L5 10'        Supine heel slides HEP reviewed           Supine hip abd slides  HEP reviewed x20  x20         Supine hip add isometric    5" 2x10 5" 2x10        Standing hip 3 ways   x10 ea dir  2x10 ea dir  RTB 2x10 ea        Supine SLR             Standing hamstring stretch at step- hip flex <90*  reviewed 3x30" 3x30" 3x30"                     Pt education PT POC, HEP, post op statusand expectations Post op status, precautions, HEP  DOMS  SPC use, side sleeping w pillow between knees                     Ther Activity             TG squats             Fwd step ups    6" 2x10 6" 2x10        Lateral step ups     6" 2x10 6" 2x10        Gait Training             With [facility]                          Modalities

## 2022-10-31 ENCOUNTER — OFFICE VISIT (OUTPATIENT)
Dept: PHYSICAL THERAPY | Facility: CLINIC | Age: 62
End: 2022-10-31

## 2022-10-31 DIAGNOSIS — M16.11 PRIMARY OSTEOARTHRITIS OF ONE HIP, RIGHT: ICD-10-CM

## 2022-10-31 DIAGNOSIS — M25.551 CHRONIC PAIN OF RIGHT HIP: ICD-10-CM

## 2022-10-31 DIAGNOSIS — Z96.641 S/P TOTAL RIGHT HIP ARTHROPLASTY: Primary | ICD-10-CM

## 2022-10-31 DIAGNOSIS — G89.29 CHRONIC PAIN OF RIGHT HIP: ICD-10-CM

## 2022-10-31 NOTE — PROGRESS NOTES
Daily Note     Today's date: 10/31/2022  Patient name: Dony Garcia  : 1960  MRN: [id number]  Referring provider: Roberto Benites MD  Dx:   Encounter Diagnosis     ICD-10-CM    1  S/P total right hip arthroplasty  Z96 641    2  Primary osteoarthritis of one hip, right  M16 11    3  Chronic pain of right hip  M25 551     G89 29                   Subjective: Patient reports that he feels good walking and has been trying to get between 2-3,000 steps a day  He denies pain or significant fatigue following the walking  He states that he has been walking without the cane without issue  Objective: See treatment diary below      Assessment: Tolerated treatment well  Progressed patient with increased reps and exercises as outlined below to facilitate improved left lower extremity strength  Patient challenged with sidelying SLRs secondary to muscular fatigue and required both verbal and tactile cues to remain in abduction plane  Patient and his wife was educated that cane usage is still advised for outdoor or prolonged periods of walking for fatigue and balance/stability  He was advised that he could wean away from the cane when walking within his home if he still feels stable with this  He was also provided an updated HEP and demonstrated/verbalized understanding it's completion  Patient demonstrated fatigue post treatment, exhibited good technique with therapeutic exercises and would benefit from continued PT      Plan: Continue per plan of care  Precautions: HTN, gout   R ARIA 10/13/22 Posterior Hip Precautions  Access Code: [email or web address]       Manuals 10/3 10/17 10/20 10/24 10/26 10/31       R hip PROM- Post hip precautions  BE BE BE KT BE       R hip flexor, adductor, hamstring stretch   BE BE KT BE                                 Neuro Re-Ed             Glute sets HEP reviewed    DC       Glute set with bridge   5" 2x10 5" 2x10 5" 2x10 5" 3x10       Supine hip abd with TB in hooklying   GTB 5" 2x10 GTB 5" 2x10 GTB 5" 2x10        Sidelying SLR     10x pillow bw knees 2x10 V/T cues        Clamshells                                        Ther Ex             Nustep- hip flex <90*   Seat 13   L3 10' Seat 13   L5 10' Seat 13   L5 10' Seat 13   L5 10'       Supine heel slides HEP reviewed    DC       Supine hip abd slides  HEP reviewed x20  x20  DC       Supine hip add isometric    5" 2x10 5" 2x10 DC       Standing hip 3 ways   x10 ea dir  2x10 ea dir  RTB 2x10 ea RTB 2x10 ea       Supine SLR      2x10       Standing hamstring stretch at step- hip flex <90*  reviewed 3x30" 3x30" 3x30" 3x30"                    Pt education PT POC, HEP, post op statusand expectations Post op status, precautions, HEP  DOMS  SPC use, side sleeping w pillow between knees SPC usage, updated HEP                      Ther Activity             TG squats      L20 2x10   Not past 90* hip flex       Fwd step ups    6" 2x10 6" 2x10 6" 3x10       Lateral step ups     6" 2x10 6" 2x10 6" 3x10       Gait Training             With [facility]                          Modalities

## 2022-11-03 ENCOUNTER — OFFICE VISIT (OUTPATIENT)
Dept: PHYSICAL THERAPY | Facility: CLINIC | Age: 62
End: 2022-11-03

## 2022-11-03 DIAGNOSIS — G89.29 CHRONIC PAIN OF RIGHT HIP: ICD-10-CM

## 2022-11-03 DIAGNOSIS — M16.11 PRIMARY OSTEOARTHRITIS OF ONE HIP, RIGHT: ICD-10-CM

## 2022-11-03 DIAGNOSIS — Z96.641 S/P TOTAL RIGHT HIP ARTHROPLASTY: Primary | ICD-10-CM

## 2022-11-03 DIAGNOSIS — M25.551 CHRONIC PAIN OF RIGHT HIP: ICD-10-CM

## 2022-11-03 NOTE — PROGRESS NOTES
Daily Note     Today's date: 11/3/2022  Patient name: Lindy Friend  : 1960  MRN: [id number]  Referring provider: Dorothy Sanchez MD  Dx:   Encounter Diagnosis     ICD-10-CM    1  S/P total right hip arthroplasty  Z96 641    2  Primary osteoarthritis of one hip, right  M16 11    3  Chronic pain of right hip  M25 551     G89 29        Start Time: 1100  Stop Time: 1138  Total time in clinic (min): 38 minutes    Subjective: Pt noted that he has been feeling good and his R hip having no pain upon arrival and noted SPC only used while ambulating long distances  Pt noted that he has a appointment with [facility] tomorrow   Objective: See treatment diary below      Assessment: Continued with treatment session, Pt overall making improvements and able to demonstrate proper compliance with HEP  Tolerated treatment well with minimal verbal cues for proper technique or VC's  Pt did required verbal cues for S/L abd for proper form to avoid compensation  Patient demonstrated fatigue post treatment and exhibited good technique with therapeutic exercises  S/p treatment session patient noted no changes  Plan: Continue per plan of care  Precautions: HTN, gout   R ARIA 10/13/22 Posterior Hip Precautions  Access Code: [email or web address]       Manuals 10/3 10/17 10/20 10/24 10/26 10/31 11/3      R hip PROM- Post hip precautions  BE BE BE KT BE SC      R hip flexor, adductor, hamstring stretch   BE BE KT BE SC                                Neuro Re-Ed             Glute sets HEP reviewed    DC       Glute set with bridge   5" 2x10 5" 2x10 5" 2x10 5" 3x10 5" 30x       Supine hip abd with TB in hooklying   GTB 5" 2x10 GTB 5" 2x10 GTB 5" 2x10        Sidelying SLR     10x pillow bw knees 2x10 V/T cues  2x10 V/T cues      Clamshells                                        Ther Ex             Nustep- hip flex <90*   Seat 13   L3 10' Seat 13   L5 10' Seat 13   L5 10' Seat 13   L5 10' Seat 13 L5 10min       Supine heel slides HEP reviewed    DC       Supine hip abd slides  HEP reviewed x20  x20  DC       Supine hip add isometric    5" 2x10 5" 2x10 DC       Standing hip 3 ways   x10 ea dir  2x10 ea dir  RTB 2x10 ea RTB 2x10 ea RTB 2x 10       Supine SLR      2x10 2x 10       Standing hamstring stretch at step- hip flex <90*  reviewed 3x30" 3x30" 3x30" 3x30"                    Pt education PT POC, HEP, post op statusand expectations Post op status, precautions, HEP  DOMS  SPC use, side sleeping w pillow between knees SPC usage, updated HEP                      Ther Activity             TG squats      L20 2x10   Not past 90* hip flex L22 2x 10 not past 90 degrees of hip flexion       Fwd step ups    6" 2x10 6" 2x10 6" 3x10 6" 3x 10       Lateral step ups     6" 2x10 6" 2x10 6" 3x10 6" 3x 10       Gait Training             With [facility]                          Modalities

## 2022-11-04 ENCOUNTER — OFFICE VISIT (OUTPATIENT)
Dept: OBGYN CLINIC | Facility: CLINIC | Age: 62
End: 2022-11-04

## 2022-11-04 VITALS
HEART RATE: 90 BPM | OXYGEN SATURATION: 98 % | DIASTOLIC BLOOD PRESSURE: 82 MMHG | BODY MASS INDEX: 36.08 KG/M2 | HEIGHT: 70 IN | WEIGHT: 252 LBS | SYSTOLIC BLOOD PRESSURE: 108 MMHG | RESPIRATION RATE: 18 BRPM

## 2022-11-04 DIAGNOSIS — Z48.89 AFTERCARE FOLLOWING SURGERY: Primary | ICD-10-CM

## 2022-11-04 DIAGNOSIS — Z96.641 STATUS POST RIGHT HIP REPLACEMENT: ICD-10-CM

## 2022-11-04 NOTE — PROGRESS NOTES
HPI:  Patient is a 63y o  year old  male  who presents 3 weeks s/p right total hip arthroplasty  He is doing well status post surgical intervention  He has completed physical therapy and is making progression in both range of motion strength  He is using no assistive devices to ambulate  He is not taking anything for pain in regards to medication  He is compliant with all postoperative protocols at this time      ROS:   General: No fever, no chills, no weight loss, no weight gain  HEENT:  No loss of hearing, no nose bleeds, no sore throat  Eyes:  No eye pain, no red eyes, no visual disturbance  Respiratory:  No cough, no shortness of breath, no wheezing  Cardiovascular:  No chest pain, no palpitations, no edema  GI: No abdominal pain, no nausea, no vomiting  Endocrine: No frequent urination, no excessive thirst  Urinary:  No dysuria, no hematuria, no incontinence  Musculoskeletal: see HPI and PE  Skin:  No rash, no wounds  Neurological:  No dizziness, no headache, no numbness  Psychiatric:  No difficulty concentrating, no depression, no suicide thoughts, no anxiety  Review of all other systems is negative    PMH:  Past Medical History:   Diagnosis Date   • Arthritis    • Gout    • Hypertension        PSH:  Past Surgical History:   Procedure Laterality Date   • BACK SURGERY      Discs   • CATARACT EXTRACTION     • KNEE SURGERY     • WI TOTAL HIP ARTHROPLASTY Right 10/13/2022    Procedure: ARTHROPLASTY HIP TOTAL;  Surgeon: Radha Putnam MD;  Location: [facility];  Service: Orthopedics   • ROTATOR CUFF REPAIR         Medications:  Current Outpatient Medications   Medication Sig Dispense Refill   • acetaminophen (TYLENOL) 325 mg tablet Take 2 tablets (650 mg total) by mouth every 6 (six) hours as needed for mild pain 90 tablet 0   • colchicine (COLCRYS) 0 6 mg tablet TAKE 1 TABLET TWICE A DAY AND EVERY 3 HOURS AS NEEDED FOR GOUT AS DIRECTED 396 tablet 3   • folic acid (FOLVITE) 1 mg tablet Take 1 tablet (1 mg total) by mouth daily (Patient not taking: Reported on 12/7/2022) 30 tablet 1   • levocetirizine (XYZAL) 5 MG tablet Take 5 mg by mouth every evening      • lisinopril (ZESTRIL) 20 mg tablet Take 1 tablet (20 mg total) by mouth daily 90 tablet 5   • Melatonin 1 MG CAPS Take 1 mg by mouth in the morning     • omeprazole (PriLOSEC) 40 MG capsule TAKE 1 CAPSULE DAILY 90 capsule 3   • rOPINIRole (REQUIP) 1 mg tablet TAKE 1 TABLET DAILY AT BEDTIME 90 tablet 3   • indomethacin (INDOCIN) 50 mg capsule Take 1 capsule (50 mg total) by mouth 3 (three) times a day with meals 30 capsule 0   • Multiple Vitamins-Minerals (multivitamin with minerals) tablet Take 1 tablet by mouth daily 30 tablet 0   • oxyCODONE-acetaminophen (Percocet)  mg per tablet Take 1 tablet by mouth every 4 (four) hours as needed for severe pain Max Daily Amount: 6 tablets 90 tablet 0     Current Facility-Administered Medications   Medication Dose Route Frequency Provider Last Rate Last Admin   • cyanocobalamin injection 1,000 mcg  1,000 mcg Intramuscular Q30 Days Rufino Colon MD   1,000 mcg at 05/11/18 1643       Allergies:  No Known Allergies    Family History:  Family History   Problem Relation Age of Onset   • Esophageal cancer Father    • Lung cancer Father    • Pancreatic cancer Father    • Thyroid nodules Father    • Stroke Family        Social History:  Social History     Occupational History     Comment: employed   Tobacco Use   • Smoking status: Former     Types: Cigars   • Smokeless tobacco: Never   Vaping Use   • Vaping Use: Never used   Substance and Sexual Activity   • Alcohol use: Yes     Alcohol/week: 1 0 standard drink     Types: 1 Cans of beer per week     Comment: occasional   • Drug use: No   • Sexual activity: Yes     Partners: Female       Physical Exam:  General :  Alert, cooperative, no distress, appears stated age  Blood pressure 108/82, pulse 90, resp  rate 18, height 5' 10" (1 778 m), weight 114 kg (252 lb), SpO2 98 %  Head:  Normocephalic, without obvious abnormality, atraumatic   Eyes:  Conjunctiva/corneas clear, EOM's intact,   Ears: Both ears normal appearance, no hearing deficits  Nose: Nares normal, septum midline, no drainage    Neck: Supple,  trachea midline, no adenopathy, no tenderness, no mass   Back:   Symmetric, no curvature, ROM normal, no tenderness   Lungs:   Respirations unlabored   Chest Wall:  No tenderness or deformity   Extremities: Extremities normal, atraumatic, no cyanosis or edema      Pulses: 2+ and symmetric   Skin: Skin color, texture, turgor normal, no rashes or lesions      Neurologic: Normal           Right Hip Exam     Tenderness   The patient is experiencing no tenderness  Range of Motion   The patient has normal right hip ROM  Muscle Strength   The patient has normal right hip strength  Abduction: 5/5   Adduction: 5/5   Flexion: 5/5     Tests   YESENIA: negative    Other   Erythema: absent  Scars: present  Sensation: normal  Pulse: present    Comments:  Incision healing well - no signs of infection             Imaging Studies: The following imaging studies were reviewed in office today  My findings are noted  No new imaging taken at today's visit  Assessment  Encounter Diagnoses   Name Primary? • Aftercare following surgery Yes   • Status post right hip replacement          Plan: Gabino Pearce is a 63 y o   male who presents approximately 3 weeks status post right total hip arthroplasty   DOS: 10/13/22   · Sutures removed in the office today  · Continue with all post-op protocols for the next 3 weeks   · Continue with physical therapy to make improvements with range of motion and strength  · Follow up in  4 weeks for repeat evaluation of the right hip    Scribe Attestation    I,:  Bess Freitas am acting as a scribe while in the presence of the attending physician :       I,:  Danelle Lagunas MD personally performed the services described in this documentation    as scribed in my presence :

## 2022-11-05 DIAGNOSIS — M25.551 CHRONIC PAIN OF RIGHT HIP: ICD-10-CM

## 2022-11-05 DIAGNOSIS — M25.50 ARTHRALGIA, UNSPECIFIED JOINT: ICD-10-CM

## 2022-11-05 DIAGNOSIS — M16.11 PRIMARY OSTEOARTHRITIS OF ONE HIP, RIGHT: ICD-10-CM

## 2022-11-05 DIAGNOSIS — G89.29 CHRONIC PAIN OF RIGHT HIP: ICD-10-CM

## 2022-11-07 ENCOUNTER — OFFICE VISIT (OUTPATIENT)
Dept: PHYSICAL THERAPY | Facility: CLINIC | Age: 62
End: 2022-11-07

## 2022-11-07 ENCOUNTER — TELEPHONE (OUTPATIENT)
Dept: FAMILY MEDICINE CLINIC | Facility: CLINIC | Age: 62
End: 2022-11-07

## 2022-11-07 DIAGNOSIS — M16.11 PRIMARY OSTEOARTHRITIS OF ONE HIP, RIGHT: ICD-10-CM

## 2022-11-07 DIAGNOSIS — M25.551 CHRONIC PAIN OF RIGHT HIP: ICD-10-CM

## 2022-11-07 DIAGNOSIS — G89.29 CHRONIC PAIN OF RIGHT HIP: ICD-10-CM

## 2022-11-07 DIAGNOSIS — Z96.641 S/P TOTAL RIGHT HIP ARTHROPLASTY: Primary | ICD-10-CM

## 2022-11-07 DIAGNOSIS — G89.4 CHRONIC PAIN SYNDROME: Primary | ICD-10-CM

## 2022-11-07 RX ORDER — OXYCODONE AND ACETAMINOPHEN 10; 325 MG/1; MG/1
1 TABLET ORAL EVERY 4 HOURS PRN
Qty: 90 TABLET | Refills: 0 | Status: SHIPPED | OUTPATIENT
Start: 2022-11-07

## 2022-11-07 RX ORDER — INDOMETHACIN 50 MG/1
50 CAPSULE ORAL
Qty: 30 CAPSULE | Refills: 0 | Status: SHIPPED | OUTPATIENT
Start: 2022-11-07

## 2022-11-07 NOTE — TELEPHONE ENCOUNTER
Dr Cecily Sun, pt is requesting his  Oxycodone prescription 5 mg every four hours as needed for pain   No longer on current med list      Jef Sol/ever  11/07/22  3:00 PM

## 2022-11-07 NOTE — PROGRESS NOTES
Daily Note     Today's date: 2022  Patient name: Delta Pizarro  : 1960  MRN: [id number]  Referring provider: Chuck Albert MD  Dx:   Encounter Diagnosis     ICD-10-CM    1  S/P total right hip arthroplasty  Z96 641    2  Primary osteoarthritis of one hip, right  M16 11    3  Chronic pain of right hip  M25 551     G89 29                   Subjective: Patient reports that he followed up with his surgeon on 22 and they were pleased with his progress thus far  He was advised that he doesn't have to use his [facility] any longer if he feels comfortable walking without it as well as to continue with post op precautions for the next 3 weeks  They removed his sutures at this visit as well and cleared him for driving  Objective: See treatment diary below      Assessment: Tolerated treatment well  Patient cued to complete step ups and hip 3 ways without reliance on UEs and to complete with finger touch support  Minor instabilities noted with decreased UE support  Progressed patient with additional of clamshells and lateral walks with TB to facilitate improved hip stability  Required cues for correct body mechanics with clamshells to avoid lumbar rotation  Patient demonstrated fatigue post treatment, exhibited good technique with therapeutic exercises and would benefit from continued PT      Plan: Progress treatment as tolerated  Precautions: HTN, gout   R ARIA 10/13/22 Posterior Hip Precautions    Per office visit on 22: "follow all post-op protocols for the next 3 weeks"    Access Code: [facility] ARMEN       Manuals 10/3 10/17 10/20 10/24 10/26 10/31 11/3 11/7     R hip PROM- Post hip precautions  BE BE BE KT BE SC BE     R hip flexor, adductor, hamstring stretch   BE BE KT BE SC BE                               Neuro Re-Ed             Glute set with bridge   5" 2x10 5" 2x10 5" 2x10 5" 3x10 5" 30x  5" 3x10     Supine hip abd with TB in hooklying   GTB 5" 2x10 GTB 5" 2x10 GTB 5" 2x10   DC     Sidelying SLR     10x pillow bw knees 2x10 V/T cues  2x10 V/T cues 2x10 V/T cues     Clamshells         2x10  VCs     Lateral walks with TB        RTB x2 laps      Monster walks with TB                          Ther Ex             Nustep- hip flex <90*   Seat 13   L3 10' Seat 13   L5 10' Seat 13   L5 10' Seat 13   L5 10' Seat 13 L5 10min  Seat 13 L6 10'     Standing hip 3 ways   x10 ea dir  2x10 ea dir  RTB 2x10 ea RTB 2x10 ea RTB 2x 10  RTB 3x10 ea      Supine SLR      2x10 2x 10  2x10     Standing hamstring stretch at step- hip flex <90*  reviewed 3x30" 3x30" 3x30" 3x30"                    Pt education PT POC, HEP, post op statusand expectations Post op status, precautions, HEP  DOMS  SPC use, side sleeping w pillow between knees SPC usage, updated HEP                      Ther Activity             TG squats      L20 2x10   Not past 90* hip flex L22 2x 10 not past 90 degrees of hip flexion  L22 3x 10 not past 90 degrees of hip flexion      Fwd step ups    6" 2x10 6" 2x10 6" 3x10 6" 3x 10  6" 3x 10  8" NV    Lateral step ups     6" 2x10 6" 2x10 6" 3x10 6" 3x 10  6" 3x 10  8" NV    Gait Training             With [facility]                          Modalities

## 2022-11-07 NOTE — PROGRESS NOTES
PT Re-Evaluation     Today's date: 2022  Patient name: Shweta Domínguez  : 1960  MRN: [id number]  Referring provider: Dorcas Harper MD  Dx:   No diagnosis found  Assessment  Assessment details: Shweta Domínguez is a 63 y o  male presenting to physical therapy for a initial evaluation as he underwent a R ARIA on 10/13/22  The patient demonstrates decreased right hip ROM, decreased hip strength, and increased pain limiting his tolerance for prolonged weightbearing activities  The patient demonstrates an antalgic gait pattern with use of a rolling walker  These deficits have limited the patient's ability to complete ADLs, vocational responsibilities, and recreational activities  This patient would benefit from OP PT services to address their impairments and functional limitations to maximize functional mobility  The patient and his wife were educated on bed mobility mechanics, hip precautions for his bed and on the couch, and reviewed/demonstrated understanding current HEP  All questions were answered at end of session  Shweta Domínguez is a 63 y o  male presenting to physical therapy for a reevaluation s/p R ARIA on 10/13/22  Since his initial evaluation, he reports ** improvement in his hip  The patient has demonstrated improved **  However, they continue to have ** leading to limitations in their ability to **  This patient would benefit from continued PT services to address their impairments and functional limitations to maximize functional outcome     Impairments: abnormal gait, abnormal or restricted ROM, activity intolerance, impaired balance, impaired physical strength, lacks appropriate home exercise program, pain with function and weight-bearing intolerance    Symptom irritability: moderateUnderstanding of Dx/Px/POC: excellent   Prognosis: good    Goals  STG to be achieved in 4 weeks: ALL NOT MET- PROGRESSING  The patient will report a decrease in right hip "at worst" subjective pain rating score to at least 5/10 to allow for improved tolerance for weightbearing activities  The patient will increase right hip flexion AROM to at least 60 degrees to allow for improved functional mobility  The patient will increase right hip flexion MMT score to at least 4/5 to allow for improved functional mobility  LTG to be achieved by DC: ALL NOT MET- PROGRESSING  The patient will be independent in comprehensive HEP  The patient will report no pain with usual activities  The patient will improve right hip AROM to [facility] to allow for improved functional mobility  The patient will increase right hip MMT score to [facility] to allow for improved functional mobility  The patient will be able to ambulate one mile without pain or difficulty to allow for improved functional mobility and return to work  The patient will be able to return to squatting and lifting 60lbs without pain or difficulty to allow for return to work  The patient will be able to stand for >4 hours at a time without pain or difficulty to allow for return to work  The patient will improve TUG and 5 STS functional assessments <10 seconds demonstrating improved functional mobility and hip strength  Plan  Patient would benefit from: skilled physical therapy  Planned modality interventions: thermotherapy: hydrocollator packs, TENS and cryotherapy  Planned therapy interventions: manual therapy, joint mobilization, balance/weight bearing training, neuromuscular re-education, patient education, flexibility, strengthening, stretching, therapeutic activities, therapeutic exercise, gait training and home exercise program  Frequency: 2x week  Duration in weeks: 12  Plan of Care beginning date: 10/3/2022  Plan of Care expiration date: 12/26/2022  Treatment plan discussed with: patient        Subjective Evaluation    History of Present Illness  Mechanism of injury: Leonie Moore presents to therapy post operatively as he underwent R ARIA on 10/13/22   Patient reports that post operatively things are going fairly well  He reports that he has been walking with the rolling walker without issue or feelings instability  He reports that the morning after he was DC home from the hospital he was able to get into his shower  He reports that he has a handicap shower with grab bars and he was able to shower with assistance with his wife  He reports that he had to modify the elevated toilet seat to adjust to his height, however he states that it works well for him now that him and his wife were able to fix it  He reports that he has been able to get in and out of his spare bedroom without issue, however he has questions about getting out of bed on his side  He reports that pain has largely improved from prior to surgery with 6/10 pain located on incision area  He reports that he has been keeping up his pain medications as instructed and prescribed as well as utilizing an ice pack  He reports that he took his gout medication last night as he was having some knee pain and this medication helped to alleviate these symptoms  He states that he has been completing his HEP 2x per day  First post operative follow up with surgeon is 10/21/22  The patient reports ** improvement since beginning PT services  ** notes improvement in ** since initial evaluation  ** reports that they have continued difficulty with **     Pain  Current pain ratin  At best pain ratin  At worst pain ratin  Location: R posterior hip/ incision area   Quality: sharp and dull ache  Relieving factors: change in position, ice and relaxation  Aggravating factors: standing, walking, stair climbing and lifting  Progression: improved    Social Support  Steps to enter house: yes  Stairs in house: yes   Lives in: multiple-level home  Lives with: spouse    Employment status: not working (Out of work at this time- [employer] work)  Treatments  Previous treatment: injection treatment and medication  Current treatment: physical therapy  Patient Goals  Patient goals for therapy: decreased pain, increased motion, increased strength, return to work and independence with ADLs/IADLs  Patient goal: Get better and return to work         Objective     Observations     Additional Observation Details  Incision at right posterior lateral hip covered with post operative dressing  No redness evident, minor swelling and bruising present     Palpation     Right   Tenderness of the distal biceps femoris, distal semimembranosus, distal semitendinosus, lateral gastrocnemius and medial gastrocnemius       Active Range of Motion   Left Hip   Flexion: 89 degrees   Abduction: 25 degrees   External rotation (90/90): 23 degrees   Internal rotation (90/90): 15 degrees     Right Hip   Flexion: 52 degrees with pain  Abduction: 17 degrees with pain  External rotation (90/90): 8 degrees with pain  Internal rotation (90/90): 10 degrees with pain    Passive Range of Motion     Right Hip   Flexion: 65 degrees with pain  Abduction: 21 degrees with pain  External rotation (90/90): 10 degrees with pain  Internal rotation (90/90): 10 degrees with pain    Strength/Myotome Testing     Left Hip   Planes of Motion   Flexion: 4-  Abduction: 4+  External rotation: 4+  Internal rotation: 4+    Right Hip   Planes of Motion   Flexion: 3  Abduction: 3+  External rotation: 3-  Internal rotation: 3-    Ambulation     Ambulation: Level Surfaces   Ambulation with assistive device: independent    Observational Gait     Additional Observational Gait Details  Antalgic with step through gait pattern, use of RW    Functional Assessment        Comments  5 STS- 27 31 seconds, B/L UE assistance needed for push off and eccentric control to sit with R LE kicked out front, rolling walker in front of patient  TUG- 31 14 seconds, B/L UE assistance needed for push off and eccentric control to sit, with R LE kicked out front, antalgic step through gait pattern with rolling walker Precautions: HTN, gout   R ARIA 10/13/22 Posterior Hip Precautions  Access Code: [email or web address]     Manuals 10/3 10/17 10/20 10/24 10/26 10/31 11/3 11/7     R hip PROM- Post hip precautions  BE BE BE KT BE SC      R hip flexor, adductor, hamstring stretch   BE BE KT BE SC                                Neuro Re-Ed             Glute sets HEP reviewed    DC       Glute set with bridge   5" 2x10 5" 2x10 5" 2x10 5" 3x10 5" 30x       Supine hip abd with TB in hooklying   GTB 5" 2x10 GTB 5" 2x10 GTB 5" 2x10        Sidelying SLR     10x pillow bw knees 2x10 V/T cues  2x10 V/T cues      Clamshells                                        Ther Ex             Nustep- hip flex <90*   Seat 13   L3 10' Seat 13   L5 10' Seat 13   L5 10' Seat 13   L5 10' Seat 13 L5 10min       Supine heel slides HEP reviewed    DC       Supine hip abd slides  HEP reviewed x20  x20  DC       Supine hip add isometric    5" 2x10 5" 2x10 DC       Standing hip 3 ways   x10 ea dir  2x10 ea dir  RTB 2x10 ea RTB 2x10 ea RTB 2x 10       Supine SLR      2x10 2x 10       Standing hamstring stretch at step- hip flex <90*  reviewed 3x30" 3x30" 3x30" 3x30"                    Pt education PT POC, HEP, post op statusand expectations Post op status, precautions, HEP  DOMS  SPC use, side sleeping w pillow between knees SPC usage, updated HEP                      Ther Activity             TG squats      L20 2x10   Not past 90* hip flex L22 2x 10 not past 90 degrees of hip flexion       Fwd step ups    6" 2x10 6" 2x10 6" 3x10 6" 3x 10       Lateral step ups     6" 2x10 6" 2x10 6" 3x10 6" 3x 10       Gait Training             With [facility]                          Modalities

## 2022-11-08 RX ORDER — MULTIVIT-MIN/IRON FUM/FOLIC AC 7.5 MG-4
1 TABLET ORAL DAILY
Qty: 30 TABLET | Refills: 0 | Status: SHIPPED | OUTPATIENT
Start: 2022-11-08

## 2022-11-10 ENCOUNTER — OFFICE VISIT (OUTPATIENT)
Dept: PHYSICAL THERAPY | Facility: CLINIC | Age: 62
End: 2022-11-10

## 2022-11-10 DIAGNOSIS — Z96.641 S/P TOTAL RIGHT HIP ARTHROPLASTY: Primary | ICD-10-CM

## 2022-11-10 DIAGNOSIS — M25.551 CHRONIC PAIN OF RIGHT HIP: ICD-10-CM

## 2022-11-10 DIAGNOSIS — M16.11 PRIMARY OSTEOARTHRITIS OF ONE HIP, RIGHT: ICD-10-CM

## 2022-11-10 DIAGNOSIS — G89.29 CHRONIC PAIN OF RIGHT HIP: ICD-10-CM

## 2022-11-10 NOTE — PROGRESS NOTES
Daily Note     Today's date: 11/10/2022  Patient name: Alessandro Castro  : 1960  MRN: [id number]  Referring provider: Patt Brumfield MD  Dx:   Encounter Diagnosis     ICD-10-CM    1  S/P total right hip arthroplasty  Z96 641    2  Primary osteoarthritis of one hip, right  M16 11    3  Chronic pain of right hip  M25 551     G89 29                   Subjective: He reports that he has been sleeping upstairs in his bed again and is able to do the stairs in his house without issue  Notes that ascending the stairs he can do it reciprocally with a handrail, however descending the stairs he has to complete them non-reciprocally with the handrail  Objective: See treatment diary below      Assessment: Tolerated treatment well  Progressed patient with increased reps as outlined below to facilitate improved hip strength  Added foam to bridges to challenge glute muscle recruitment  Noted increased fatigue with progressions this session  Patient demonstrated fatigue post treatment, exhibited good technique with therapeutic exercises and would benefit from continued PT      Plan: Progress treatment as tolerated  Precautions: HTN, gout   R ARIA 10/13/22 Posterior Hip Precautions    Per office visit on 22: "follow all post-op protocols for the next 3 weeks"    Access Code: [facility] ARMEN       Manuals 10/3 10/17 10/20 10/24 10/26 10/31 11/3 11/7 11/10    R hip PROM- Post hip precautions  BE BE BE KT BE SC BE BE    R hip flexor, adductor, hamstring stretch   BE BE KT BE SC BE BE                              Neuro Re-Ed             Glute set with bridge   5" 2x10 5" 2x10 5" 2x10 5" 3x10 5" 30x  5" 3x10 5" 3x10 with biodex foam     Supine hip abd with TB in hooklying   GTB 5" 2x10 GTB 5" 2x10 GTB 5" 2x10   DC     Sidelying SLR     10x pillow bw knees 2x10 V/T cues  2x10 V/T cues 2x10 V/T cues 3x10    Clamshells         2x10  VCs 3" 2x10    Lateral walks with TB        RTB x2 laps  RTB x3 laps     Monster walks with TB fwd/bwd         RTB x3 laps     Rockerboard taps A/P, M/L with holds                           Ther Ex             Nustep- hip flex <90*   Seat 13   L3 10' Seat 13   L5 10' Seat 13   L5 10' Seat 13   L5 10' Seat 13 L5 10min  Seat 13 L6 10' Seat 13 L7 10'    Standing hip 3 ways   x10 ea dir  2x10 ea dir  RTB 2x10 ea RTB 2x10 ea RTB 2x 10  RTB 3x10 ea      Supine SLR      2x10 2x 10  2x10 3x10    Standing hamstring stretch at step- hip flex <90*  reviewed 3x30" 3x30" 3x30" 3x30"   3x30"                 Pt education PT POC, HEP, post op statusand expectations Post op status, precautions, HEP  DOMS  SPC use, side sleeping w pillow between knees SPC usage, updated HEP                      Ther Activity             TG squats      L20 2x10   Not past 90* hip flex L22 2x 10 not past 90 degrees of hip flexion  L22 3x 10 not past 90 degrees of hip flexion  L22 3x 10 not past 90 degrees of hip flexion    Fwd step ups    6" 2x10 6" 2x10 6" 3x10 6" 3x 10  6" 3x 10  8" 3x10     Lateral step ups     6" 2x10 6" 2x10 6" 3x10 6" 3x 10  6" 3x 10  8" 3x10    Gait Training             With [facility]                          Modalities

## 2022-11-14 ENCOUNTER — APPOINTMENT (OUTPATIENT)
Dept: PHYSICAL THERAPY | Facility: CLINIC | Age: 62
End: 2022-11-14

## 2022-11-14 NOTE — PROGRESS NOTES
PT Re-Evaluation     Today's date: 2022  Patient name: Savannah Tyler  : 1960  MRN: [id number]  Referring provider: Tigre Bhakta MD  Dx:   No diagnosis found  Assessment  Assessment details: Savannah Tyler is a 63 y o  male presenting to physical therapy for a initial evaluation as he underwent a R ARIA on 10/13/22  The patient demonstrates decreased right hip ROM, decreased hip strength, and increased pain limiting his tolerance for prolonged weightbearing activities  The patient demonstrates an antalgic gait pattern with use of a rolling walker  These deficits have limited the patient's ability to complete ADLs, vocational responsibilities, and recreational activities  This patient would benefit from OP PT services to address their impairments and functional limitations to maximize functional mobility  The patient and his wife were educated on bed mobility mechanics, hip precautions for his bed and on the couch, and reviewed/demonstrated understanding current HEP  All questions were answered at end of session  Savannah Tyler is a 63 y o  male presenting to physical therapy for a reevaluation s/p R ARIA on 10/13/22  Since his initial evaluation, he reports ** improvement in his hip  The patient has demonstrated improved **  However, they continue to have ** leading to limitations in their ability to **  This patient would benefit from continued PT services to address their impairments and functional limitations to maximize functional outcome     Impairments: abnormal gait, abnormal or restricted ROM, activity intolerance, impaired balance, impaired physical strength, lacks appropriate home exercise program, pain with function and weight-bearing intolerance    Symptom irritability: moderateUnderstanding of Dx/Px/POC: excellent   Prognosis: good    Goals  STG to be achieved in 4 weeks: ALL NOT MET- PROGRESSING  The patient will report a decrease in right hip "at worst" subjective pain rating score to at least 5/10 to allow for improved tolerance for weightbearing activities  The patient will increase right hip flexion AROM to at least 60 degrees to allow for improved functional mobility  The patient will increase right hip flexion MMT score to at least 4/5 to allow for improved functional mobility  LTG to be achieved by DC: ALL NOT MET- PROGRESSING  The patient will be independent in comprehensive HEP  The patient will report no pain with usual activities  The patient will improve right hip AROM to [facility] to allow for improved functional mobility  The patient will increase right hip MMT score to [facility] to allow for improved functional mobility  The patient will be able to ambulate one mile without pain or difficulty to allow for improved functional mobility and return to work  The patient will be able to return to squatting and lifting 60lbs without pain or difficulty to allow for return to work  The patient will be able to stand for >4 hours at a time without pain or difficulty to allow for return to work  The patient will improve TUG and 5 STS functional assessments <10 seconds demonstrating improved functional mobility and hip strength  Plan  Patient would benefit from: skilled physical therapy  Planned modality interventions: thermotherapy: hydrocollator packs, TENS and cryotherapy  Planned therapy interventions: manual therapy, joint mobilization, balance/weight bearing training, neuromuscular re-education, patient education, flexibility, strengthening, stretching, therapeutic activities, therapeutic exercise, gait training and home exercise program  Frequency: 2x week  Duration in weeks: 12  Plan of Care beginning date: 10/3/2022  Plan of Care expiration date: 12/26/2022  Treatment plan discussed with: patient        Subjective Evaluation    History of Present Illness  Mechanism of injury: [place] Tim presents to therapy post operatively as he underwent R ARIA on 10/13/22   Patient reports that post operatively things are going fairly well  He reports that he has been walking with the rolling walker without issue or feelings instability  He reports that the morning after he was DC home from the hospital he was able to get into his shower  He reports that he has a handicap shower with grab bars and he was able to shower with assistance with his wife  He reports that he had to modify the elevated toilet seat to adjust to his height, however he states that it works well for him now that him and his wife were able to fix it  He reports that he has been able to get in and out of his spare bedroom without issue, however he has questions about getting out of bed on his side  He reports that pain has largely improved from prior to surgery with 6/10 pain located on incision area  He reports that he has been keeping up his pain medications as instructed and prescribed as well as utilizing an ice pack  He reports that he took his gout medication last night as he was having some knee pain and this medication helped to alleviate these symptoms  He states that he has been completing his HEP 2x per day  First post operative follow up with surgeon is 10/21/22  The patient reports ** improvement since beginning PT services  ** notes improvement in ** since initial evaluation  ** reports that they have continued difficulty with **     Pain  Current pain ratin  At best pain ratin  At worst pain ratin  Location: R posterior hip/ incision area   Quality: sharp and dull ache  Relieving factors: change in position, ice and relaxation  Aggravating factors: standing, walking, stair climbing and lifting  Progression: improved    Social Support  Steps to enter house: yes  Stairs in house: yes   Lives in: multiple-level home  Lives with: spouse    Employment status: not working (Out of work at this time- [employer] work)  Treatments  Previous treatment: injection treatment and medication  Current treatment: physical therapy  Patient Goals  Patient goals for therapy: decreased pain, increased motion, increased strength, return to work and independence with ADLs/IADLs  Patient goal: Get better and return to work         Objective     Observations     Additional Observation Details  Incision at right posterior lateral hip covered with post operative dressing  No redness evident, minor swelling and bruising present     Palpation     Right   Tenderness of the distal biceps femoris, distal semimembranosus, distal semitendinosus, lateral gastrocnemius and medial gastrocnemius       Active Range of Motion   Left Hip   Flexion: 89 degrees   Abduction: 25 degrees   External rotation (90/90): 23 degrees   Internal rotation (90/90): 15 degrees     Right Hip   Flexion: 52 degrees with pain  Abduction: 17 degrees with pain  External rotation (90/90): 8 degrees with pain  Internal rotation (90/90): 10 degrees with pain    Passive Range of Motion     Right Hip   Flexion: 65 degrees with pain  Abduction: 21 degrees with pain  External rotation (90/90): 10 degrees with pain  Internal rotation (90/90): 10 degrees with pain    Strength/Myotome Testing     Left Hip   Planes of Motion   Flexion: 4-  Abduction: 4+  External rotation: 4+  Internal rotation: 4+    Right Hip   Planes of Motion   Flexion: 3  Abduction: 3+  External rotation: 3-  Internal rotation: 3-    Ambulation     Ambulation: Level Surfaces   Ambulation with assistive device: independent    Observational Gait     Additional Observational Gait Details  Antalgic with step through gait pattern, use of RW    Functional Assessment        Comments  5 STS- 27 31 seconds, B/L UE assistance needed for push off and eccentric control to sit with R LE kicked out front, rolling walker in front of patient  TUG- 31 14 seconds, B/L UE assistance needed for push off and eccentric control to sit, with R LE kicked out front, antalgic step through gait pattern with rolling walker Precautions: HTN, gout   R ARIA 10/13/22 Posterior Hip Precautions  Access Code: [email or web address]     Manuals 10/3 10/17 10/20 10/24 10/26 10/31 11/3 11/7 11/10 11/14   R hip PROM- Post hip precautions  BE BE BE KT BE SC BE BE    R hip flexor, adductor, hamstring stretch   BE BE KT BE SC BE BE                              Neuro Re-Ed             Glute set with bridge   5" 2x10 5" 2x10 5" 2x10 5" 3x10 5" 30x  5" 3x10 5" 3x10 with biodex foam     Supine hip abd with TB in hooklying   GTB 5" 2x10 GTB 5" 2x10 GTB 5" 2x10   DC     Sidelying SLR     10x pillow bw knees 2x10 V/T cues  2x10 V/T cues 2x10 V/T cues 3x10    Clamshells         2x10  VCs 3" 2x10    Lateral walks with TB        RTB x2 laps  RTB x3 laps     Monster walks with TB fwd/bwd         RTB x3 laps     Rockerboard taps A/P, M/L with holds                           Ther Ex             Nustep- hip flex <90*   Seat 13   L3 10' Seat 13   L5 10' Seat 13   L5 10' Seat 13   L5 10' Seat 13 L5 10min  Seat 13 L6 10' Seat 13 L7 10'    Standing hip 3 ways   x10 ea dir  2x10 ea dir  RTB 2x10 ea RTB 2x10 ea RTB 2x 10  RTB 3x10 ea      Supine SLR      2x10 2x 10  2x10 3x10    Standing hamstring stretch at step- hip flex <90*  reviewed 3x30" 3x30" 3x30" 3x30"   3x30"                 Pt education PT POC, HEP, post op statusand expectations Post op status, precautions, HEP  DOMS  SPC use, side sleeping w pillow between knees SPC usage, updated HEP                      Ther Activity             TG squats      L20 2x10   Not past 90* hip flex L22 2x 10 not past 90 degrees of hip flexion  L22 3x 10 not past 90 degrees of hip flexion  L22 3x 10 not past 90 degrees of hip flexion    Fwd step ups    6" 2x10 6" 2x10 6" 3x10 6" 3x 10  6" 3x 10  8" 3x10     Lateral step ups     6" 2x10 6" 2x10 6" 3x10 6" 3x 10  6" 3x 10  8" 3x10    Gait Training             With [facility]                          Modalities

## 2022-11-16 ENCOUNTER — EVALUATION (OUTPATIENT)
Dept: PHYSICAL THERAPY | Facility: CLINIC | Age: 62
End: 2022-11-16

## 2022-11-16 DIAGNOSIS — M25.551 CHRONIC PAIN OF RIGHT HIP: ICD-10-CM

## 2022-11-16 DIAGNOSIS — M16.11 PRIMARY OSTEOARTHRITIS OF ONE HIP, RIGHT: ICD-10-CM

## 2022-11-16 DIAGNOSIS — Z96.641 S/P TOTAL RIGHT HIP ARTHROPLASTY: Primary | ICD-10-CM

## 2022-11-16 DIAGNOSIS — G89.29 CHRONIC PAIN OF RIGHT HIP: ICD-10-CM

## 2022-11-16 NOTE — PROGRESS NOTES
PT Re-Evaluation     Today's date: 2022  Patient name: Kulwant Sage  : 1960  MRN: [id number]  Referring provider: Oksana Fermin MD  Dx:   Encounter Diagnosis     ICD-10-CM    1  S/P total right hip arthroplasty  Z96 641       2  Primary osteoarthritis of one hip, right  M16 11       3  Chronic pain of right hip  M25 551     G89 29                      Assessment  Assessment details: Kulwant Sage is a 63 y o  male presenting to physical therapy for a reevaluation s/p R ARIA on 10/13/22  Since his initial evaluation, he reports 80% improvement in his hip  The patient has demonstrated improved hip ROM to those of his limits per orthopedic surgeon as well as significant improvements with his strength and functional mobility  He is able to ambulate without use of AD without pain for approximately 45 minutes to 1 hour without pain  His functional mobility has also improved as noted by his decrease in times for the 5 STS and TUG  At this time, he has not performed any higher level balance and stability exercises nor deep squats with lifting secondary to post-operative precautions  He also continues with decreased functional hip strength, decreased hip flexion ROM past 90 degrees leading to limitations in their ability to complete ADLs, vocational responsibilities, and recreational activities  This patient would benefit from continued PT services to address their impairments and functional limitations to maximize functional outcome  Impairments: abnormal or restricted ROM, activity intolerance, impaired balance, impaired physical strength, lacks appropriate home exercise program and weight-bearing intolerance    Symptom irritability: moderateUnderstanding of Dx/Px/POC: excellent   Prognosis: good    Goals  STG to be achieved in 4 weeks: The patient will report a decrease in right hip "at worst" subjective pain rating score to at least 5/10 to allow for improved tolerance for weightbearing activities  MET  The patient will increase right hip flexion AROM to at least 60 degrees to allow for improved functional mobility  MET  The patient will increase right hip flexion MMT score to at least 4/5 to allow for improved functional mobility  MET    LTG to be achieved by DC: The patient will be independent in comprehensive HEP  NOT MET  The patient will report no pain with usual activities  NOT MET  The patient will improve right hip AROM to [facility] to allow for improved functional mobility  NOT MET  The patient will increase right hip MMT score to [facility] to allow for improved functional mobility  NOT MET  The patient will be able to ambulate one mile without pain or difficulty to allow for improved functional mobility and return to work  MET  The patient will be able to return to squatting and lifting 60lbs without pain or difficulty to allow for return to work  NOT MET  The patient will be able to stand for >4 hours at a time without pain or difficulty to allow for return to work  NOT MET  The patient will improve TUG and 5 STS functional assessments <10 seconds demonstrating improved functional mobility and hip strength  NOT MET 5 STS, MET TUG      Plan  Patient would benefit from: skilled physical therapy  Planned modality interventions: thermotherapy: hydrocollator packs, TENS and cryotherapy  Planned therapy interventions: manual therapy, joint mobilization, balance/weight bearing training, neuromuscular re-education, patient education, flexibility, strengthening, stretching, therapeutic activities, therapeutic exercise, gait training and home exercise program  Frequency: 2x week  Duration in weeks: 12  Plan of Care beginning date: 10/3/2022  Plan of Care expiration date: 12/26/2022  Treatment plan discussed with: patient        Subjective Evaluation    History of Present Illness  Mechanism of injury: Kota Kenney reports 80% improvement since beginning PT services  He notes improvement in pain levels since initial evaluation   He reports that he no longer experiences 8-10/10 pain every day with moving  He states that he pretty much has zero pain every day with all activities  He reports occasional discomfort in the hip if he moves it in the "wrong way", meaning pushing the limits of his hip precautions  He states that his walking has also largely improved now secondary to not experiencing pain  He is able to walk upwards of 45 minutes to an hour at one time  He is not back to work at this time and still has to abide by his hip precautions at this time  When at work, he is frequently getting on/off machinery, squatting and lifting heavy boxes, unloading delivery trucks with pallet jacks, 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   he He has not attempted any squatting or picking objects up off the ground as he cannot bend his hip past 90 degrees at this time  Next follow up with surgeon is 22  Pain  Current pain ratin  At best pain ratin  At worst pain ratin  Location: R lateral hip  Quality: discomfort  Relieving factors: change in position, ice and relaxation  Aggravating factors: standing, walking, stair climbing and lifting  Progression: improved    Social Support  Steps to enter house: yes  Stairs in house: yes   Lives in: multiple-level home  Lives with: spouse    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)  Treatments  Previous treatment: injection treatment and medication  Current treatment: physical therapy  Patient Goals  Patient goals for therapy: decreased pain, increased motion, increased strength, return to work and independence with ADLs/IADLs  Patient goal: Get better and return to work         Objective     Palpation     Right   Tenderness of the distal biceps femoris, distal semimembranosus, distal semitendinosus, lateral gastrocnemius and medial gastrocnemius       Active Range of Motion   Left Hip   Flexion: 89 degrees   Abduction: 25 degrees   External rotation (90/90): 23 degrees   Internal rotation (90/90): 15 degrees     Right Hip   Flexion: 90 degrees with pain  Abduction: 25 degrees   External rotation (90/90): 10 degrees with pain  Internal rotation (90/90): 15 degrees with pain    Passive Range of Motion     Right Hip   Flexion: 90 degrees with pain  Abduction: 25 degrees   External rotation (90/90): 18 degrees with pain  Internal rotation (90/90): 17 degrees with pain    Strength/Myotome Testing     Left Hip   Planes of Motion   Flexion: 4+  Extension: 4  Abduction: 4+  External rotation: 4+  Internal rotation: 4+    Right Hip   Planes of Motion   Flexion: 4  Extension: 4  Abduction: 4-  External rotation: 4-  Internal rotation: 4-    Ambulation     Ambulation: Level Surfaces   Ambulation without assistive device: independent    Functional Assessment        Comments  5 STS- 12 72 seconds, no UE assistance needed for push off, good eccentric control noted with sitting   TUG- 8 60 seconds, no UE assistance needed for push off, good eccentric control noted with sitting, no AD                Precautions: HTN, gout   R ARIA 10/13/22 Posterior Hip Precautions  Access Code: [email or web address]     Manuals 10/3 10/17 10/20 10/24 10/26 10/31 11/3 11/7 11/10 11/16   R hip PROM- Post hip precautions  BE BE BE KT BE SC BE BE BE   R hip flexor, adductor, hamstring stretch   BE BE KT BE SC BE BE                              Neuro Re-Ed             Glute set with bridge   5" 2x10 5" 2x10 5" 2x10 5" 3x10 5" 30x  5" 3x10 5" 3x10 with biodex foam     Sidelying SLR     10x pillow bw knees 2x10 V/T cues  2x10 V/T cues 2x10 V/T cues 3x10 3x10   Clamshells         2x10  VCs 3" 2x10 GTB 3" 3x10   Lateral walks with TB        RTB x2 laps  RTB x3 laps  GTB x3 laps   Monster walks with TB fwd/bwd         RTB x3 laps  GTB x3 laps    Rockerboard taps A/P, M/L with holds           NV   Hurdles fwd and lat step overs           NV   R SLS             Semi tandem stance             Ther Ex             Nustep- hip flex <90*   Seat 13   L3 10' Seat 13   L5 10' Seat 13   L5 10' Seat 13   L5 10' Seat 13 L5 10min  Seat 13 L6 10' Seat 13 L7 10' Seat 13 L7 10'   Standing hip 3 ways   x10 ea dir  2x10 ea dir  RTB 2x10 ea RTB 2x10 ea RTB 2x 10  RTB 3x10 ea      Supine SLR      2x10 2x 10  2x10 3x10 3x10   Standing hamstring stretch at step- hip flex <90*  reviewed 3x30" 3x30" 3x30" 3x30"   3x30"                 Pt education PT POC, HEP, post op statusand expectations Post op status, precautions, HEP  DOMS  SPC use, side sleeping w pillow between knees SPC usage, updated HEP      reassessment, PT POC                Ther Activity             TG squats      L20 2x10   Not past 90* hip flex L22 2x 10 not past 90 degrees of hip flexion  L22 3x 10 not past 90 degrees of hip flexion  L22 3x 10 not past 90 degrees of hip flexion L22 3x 10 not past 90 degrees of hip flexion   Fwd step ups    6" 2x10 6" 2x10 6" 3x10 6" 3x 10  6" 3x 10  8" 3x10  8" 3x10    Lateral step ups     6" 2x10 6" 2x10 6" 3x10 6" 3x 10  6" 3x 10  8" 3x10 8" 3x10    Sit to stands with KB              Gait Training             With [facility]                          Modalities

## 2022-11-18 ENCOUNTER — OFFICE VISIT (OUTPATIENT)
Dept: PHYSICAL THERAPY | Facility: CLINIC | Age: 62
End: 2022-11-18

## 2022-11-18 DIAGNOSIS — G89.29 CHRONIC PAIN OF RIGHT HIP: ICD-10-CM

## 2022-11-18 DIAGNOSIS — M25.551 CHRONIC PAIN OF RIGHT HIP: ICD-10-CM

## 2022-11-18 DIAGNOSIS — M16.11 PRIMARY OSTEOARTHRITIS OF ONE HIP, RIGHT: ICD-10-CM

## 2022-11-18 DIAGNOSIS — Z96.641 S/P TOTAL RIGHT HIP ARTHROPLASTY: Primary | ICD-10-CM

## 2022-11-18 NOTE — PROGRESS NOTES
Daily Note     Today's date: 2022  Patient name: Toñito Mccallum  : 1960  MRN: [id number]  Referring provider: Keiry North MD  Dx:   Encounter Diagnosis     ICD-10-CM    1  S/P total right hip arthroplasty  Z96 641       2  Primary osteoarthritis of one hip, right  M16 11       3  Chronic pain of right hip  M25 551     G89 29                       Subjective: Patient offers no new complaints this session  Objective: See treatment diary below      Assessment: Tolerated treatment well  Progressed patient with addition of balance and stabilization exercises this session to address hip stability functionally  Patient challenged with balance activities requiring UE support to stabilize  Patient demonstrated fatigue post treatment, exhibited good technique with therapeutic exercises and would benefit from continued PT      Plan: Progress treatment as tolerated  Precautions: HTN, gout   R ARIA 10/13/22 Posterior Hip Precautions  Access Code: [email or web address]     Manuals 11/18 10/17 10/20 10/24 10/26 10/31 11/3 11/7 11/10 11/16   R hip PROM- Post hip precautions BE BE BE BE KT BE SC BE BE BE   R hip flexor, adductor, hamstring stretch BE  BE BE KT BE SC BE BE                              Neuro Re-Ed             Glute set with bridge   5" 2x10 5" 2x10 5" 2x10 5" 3x10 5" 30x  5" 3x10 5" 3x10 with biodex foam     Sidelying SLR     10x pillow bw knees 2x10 V/T cues  2x10 V/T cues 2x10 V/T cues 3x10 3x10   Clamshells         2x10  VCs 3" 2x10 GTB 3" 3x10   Lateral walks with TB GTB x3 laps       RTB x2 laps  RTB x3 laps  GTB x3 laps   Monster walks with TB fwd/bwd GTB x3 laps        RTB x3 laps  GTB x3 laps    Rockerboard taps A/P, M/L with holds  2x20 ea dir with 2x30" holds         NV   Hurdles fwd and lat step overs           NV   R SLS 2x20"             Semi tandem stance 2x30" biodex foam            Ther Ex             Nustep- hip flex <90* rec L6 10'  Seat 13   L3 10' Seat 13   L5 10' Seat 13   L5 10' Seat 13   L5 10' Seat 13 L5 10min  Seat 13 L6 10' Seat 13 L7 10' Seat 13 L7 10'   Standing hip 3 ways   x10 ea dir  2x10 ea dir  RTB 2x10 ea RTB 2x10 ea RTB 2x 10  RTB 3x10 ea      Supine SLR      2x10 2x 10  2x10 3x10 3x10   Standing hamstring stretch at step- hip flex <90*  reviewed 3x30" 3x30" 3x30" 3x30"   3x30"                 Pt education  Post op status, precautions, HEP  DOMS  SPC use, side sleeping w pillow between knees SPC usage, updated HEP      reassessment, PT POC                Ther Activity             TG squats      L20 2x10   Not past 90* hip flex L22 2x 10 not past 90 degrees of hip flexion  L22 3x 10 not past 90 degrees of hip flexion  L22 3x 10 not past 90 degrees of hip flexion L22 3x 10 not past 90 degrees of hip flexion   Fwd step ups    6" 2x10 6" 2x10 6" 3x10 6" 3x 10  6" 3x 10  8" 3x10  8" 3x10    Lateral step ups     6" 2x10 6" 2x10 6" 3x10 6" 3x 10  6" 3x 10  8" 3x10 8" 3x10    Sit to stands with KB  15# 2x10            Gait Training                          Modalities

## 2022-11-21 ENCOUNTER — OFFICE VISIT (OUTPATIENT)
Dept: PHYSICAL THERAPY | Facility: CLINIC | Age: 62
End: 2022-11-21

## 2022-11-21 DIAGNOSIS — M25.551 CHRONIC PAIN OF RIGHT HIP: ICD-10-CM

## 2022-11-21 DIAGNOSIS — G89.29 CHRONIC PAIN OF RIGHT HIP: ICD-10-CM

## 2022-11-21 DIAGNOSIS — Z96.641 S/P TOTAL RIGHT HIP ARTHROPLASTY: Primary | ICD-10-CM

## 2022-11-21 DIAGNOSIS — M16.11 PRIMARY OSTEOARTHRITIS OF ONE HIP, RIGHT: ICD-10-CM

## 2022-11-21 NOTE — PROGRESS NOTES
Daily Note     Today's date: 2022  Patient name: Olga Lidia Cardona  : 1960  MRN: [id number]  Referring provider: Karo Bonilla MD  Dx:   Encounter Diagnosis     ICD-10-CM    1  S/P total right hip arthroplasty  Z96 641       2  Primary osteoarthritis of one hip, right  M16 11       3  Chronic pain of right hip  M25 551     G89 29                       Subjective: Patient offers no complaints this session  Objective: See treatment diary below      Assessment: Tolerated treatment well  Patient continues to be challenged with balance and stability exercises secondary to decreased hip strength and eccentric control  Patient demonstrated fatigue post treatment, exhibited good technique with therapeutic exercises and would benefit from continued PT      Plan: Progress treatment as tolerated  Precautions: HTN, gout   R ARIA 10/13/22 Posterior Hip Precautions  Access Code: [email or web address]     Manuals 11/18 11/21 10/20 10/24 10/26 10/31 11/3 11/7 11/10 11/16   R hip PROM- Post hip precautions BE BE BE BE KT BE SC BE BE BE   R hip flexor, adductor, hamstring stretch BE BE BE BE KT BE SC BE BE                              Neuro Re-Ed             Glute set with bridge   5" 2x10 5" 2x10 5" 2x10 5" 3x10 5" 30x  5" 3x10 5" 3x10 with biodex foam     Sidelying SLR  3x10   10x pillow bw knees 2x10 V/T cues  2x10 V/T cues 2x10 V/T cues 3x10 3x10   Clamshells   GTB 3" 3x10      2x10  VCs 3" 2x10 GTB 3" 3x10   Lateral walks with TB GTB x3 laps GTB x3 laps      RTB x2 laps  RTB x3 laps  GTB x3 laps   Monster walks with TB fwd/bwd GTB x3 laps GTB x3 laps       RTB x3 laps  GTB x3 laps    Rockerboard taps A/P, M/L with holds  2x20 ea dir with 2x30" holds 2x20 ea dir with 2x30" holds        NV   Hurdles fwd and lat step overs with foam   NV        NV   R SLS 2x20"  2x20"           Semi tandem stance 2x30" biodex foam 2x30" biodex foam           Slider ex             Backward lunge with slider  NV           Ther Ex Nustep- hip flex <90* rec L6 10' Seat 13 L7 10' Seat 13   L3 10' Seat 13   L5 10' Seat 13   L5 10' Seat 13   L5 10' Seat 13 L5 10min  Seat 13 L6 10' Seat 13 L7 10' Seat 13 L7 10'   Standing hip 3 ways   x10 ea dir  2x10 ea dir  RTB 2x10 ea RTB 2x10 ea RTB 2x 10  RTB 3x10 ea      Supine SLR      2x10 2x 10  2x10 3x10 3x10   Standing hamstring stretch at step- hip flex <90*   3x30" 3x30" 3x30" 3x30"   3x30"                 Pt education   DOMS  SPC use, side sleeping w pillow between knees SPC usage, updated HEP      reassessment, PT POC                Ther Activity             TG squats      L20 2x10   Not past 90* hip flex L22 2x 10 not past 90 degrees of hip flexion  L22 3x 10 not past 90 degrees of hip flexion  L22 3x 10 not past 90 degrees of hip flexion L22 3x 10 not past 90 degrees of hip flexion   Fwd step ups  8" 3x10  6" 2x10 6" 2x10 6" 3x10 6" 3x 10  6" 3x 10  8" 3x10  8" 3x10    Lateral step ups   8" 3x10  6" 2x10 6" 2x10 6" 3x10 6" 3x 10  6" 3x 10  8" 3x10 8" 3x10    Sit to stands with KB  15# 2x10 15# 2x10           Forward lunges   NV           Gait Training                          Modalities

## 2022-11-23 NOTE — PROGRESS NOTES
Daily Note     Today's date: 2022  Patient name: Екатерина Cali  : 1960  MRN: [id number]   Referring provider: Serafin Caceres MD  Dx:   Encounter Diagnosis     ICD-10-CM    1  S/P total right hip arthroplasty  Z96 641       2  Primary osteoarthritis of one hip, right  M16 11       3  Chronic pain of right hip  M25 551     G89 29                      Subjective: Patient reports that his hip is feeling good today  States that his left knee is bothering him more than anything  Objective: See treatment diary below      Assessment: Tolerated treatment well  Progressed patient with additional balance and stability exercises this session as he no longer has to abide by post operative precautions per last visit to surgeon  Challenged with forward lunges secondary to decreased stability and strength in the right hip  Hip PROM performed this session with additional stretching into more hip flexion and IR ROM  Noted increased stretching sensation, however denied increased pain  Patient demonstrated fatigue post treatment, exhibited good technique with therapeutic exercises and would benefit from continued PT      Plan: Progress treatment as tolerated  Precautions: HTN, gout   R posterior ARIA 10/13/22, ok to move past precautions  Access Code: [email or web address]     Manuals 11/18 11/21 11/25 10/24 10/26 10/31 11/3 11/7 11/10 11/16   R hip PROM  BE BE BE BE KT BE SC BE BE BE   R hip flexor, adductor, hamstring stretch BE BE BE BE KT BE SC BE BE                              Neuro Re-Ed             Glute set with bridge    5" 2x10 5" 2x10 5" 3x10 5" 30x  5" 3x10 5" 3x10 with biodex foam     Sidelying SLR  3x10   10x pillow bw knees 2x10 V/T cues  2x10 V/T cues 2x10 V/T cues 3x10 3x10   Clamshells   GTB 3" 3x10      2x10  VCs 3" 2x10 GTB 3" 3x10   Lateral walks with TB GTB x3 laps GTB x3 laps GTB x3 laps     RTB x2 laps  RTB x3 laps  GTB x3 laps   Monster walks with TB fwd/bwd GTB x3 laps GTB x3 laps GTB x3 laps RTB x3 laps  GTB x3 laps    Rockerboard taps A/P, M/L with holds  2x20 ea dir with 2x30" holds 2x20 ea dir with 2x30" holds 2x20 ea dir with 2x30" holds       NV   Hurdles fwd and lat step overs with foam   NV x3 laps with 4 foam pads (blue, green, 1 black)       NV   R SLS 2x20"  2x20"           Semi tandem stance 2x30" biodex foam 2x30" biodex foam With rebounder toss biodex foam x30 ea RMB           Slider ex   x6           Backward lunge with slider  NV NV          Ther Ex             Nustep- hip flex <90* rec L6 10' Seat 13 L7 10'  Seat 13   L5 10' Seat 13   L5 10' Seat 13   L5 10' Seat 13 L5 10min  Seat 13 L6 10' Seat 13 L7 10' Seat 13 L7 10'   Treadmill for cardiovascular endurance   1 2 mph 10'          Standing hip 3 ways    2x10 ea dir  RTB 2x10 ea RTB 2x10 ea RTB 2x 10  RTB 3x10 ea      Supine SLR      2x10 2x 10  2x10 3x10 3x10                Pt education     SPC use, side sleeping w pillow between knees SPC usage, updated HEP      reassessment, PT POC                Ther Activity             TG squats      L20 2x10   Not past 90* hip flex L22 2x 10 not past 90 degrees of hip flexion  L22 3x 10 not past 90 degrees of hip flexion  L22 3x 10 not past 90 degrees of hip flexion L22 3x 10 not past 90 degrees of hip flexion   Fwd step ups  8" 3x10 8" 3x10 6" 2x10 6" 2x10 6" 3x10 6" 3x 10  6" 3x 10  8" 3x10  8" 3x10    Lateral step ups   8" 3x10 8" 3x10 6" 2x10 6" 2x10 6" 3x10 6" 3x 10  6" 3x 10  8" 3x10 8" 3x10    Sit to stands with KB  15# 2x10 15# 2x10 15# 3x10          Forward lunges   NV  2x10 B/L          Gait Training                          Modalities

## 2022-11-25 ENCOUNTER — OFFICE VISIT (OUTPATIENT)
Dept: PHYSICAL THERAPY | Facility: CLINIC | Age: 62
End: 2022-11-25

## 2022-11-25 DIAGNOSIS — M25.551 CHRONIC PAIN OF RIGHT HIP: ICD-10-CM

## 2022-11-25 DIAGNOSIS — G89.29 CHRONIC PAIN OF RIGHT HIP: ICD-10-CM

## 2022-11-25 DIAGNOSIS — M16.11 PRIMARY OSTEOARTHRITIS OF ONE HIP, RIGHT: ICD-10-CM

## 2022-11-25 DIAGNOSIS — Z96.641 S/P TOTAL RIGHT HIP ARTHROPLASTY: Primary | ICD-10-CM

## 2022-12-06 ENCOUNTER — OFFICE VISIT (OUTPATIENT)
Dept: PHYSICAL THERAPY | Facility: CLINIC | Age: 62
End: 2022-12-06

## 2022-12-06 DIAGNOSIS — Z96.641 S/P TOTAL RIGHT HIP ARTHROPLASTY: Primary | ICD-10-CM

## 2022-12-06 DIAGNOSIS — M25.551 CHRONIC PAIN OF RIGHT HIP: ICD-10-CM

## 2022-12-06 DIAGNOSIS — M16.11 PRIMARY OSTEOARTHRITIS OF ONE HIP, RIGHT: ICD-10-CM

## 2022-12-06 DIAGNOSIS — G89.29 CHRONIC PAIN OF RIGHT HIP: ICD-10-CM

## 2022-12-06 NOTE — PROGRESS NOTES
Daily Note     Today's date: 2022  Patient name: Noemi Wheeler  : 1960  MRN: [id number]  Referring provider: Lisbeth Nice MD  Dx:   Encounter Diagnosis     ICD-10-CM    1  S/P total right hip arthroplasty  Z96 641       2  Primary osteoarthritis of one hip, right  M16 11       3  Chronic pain of right hip  M25 551     G89 29                      Subjective: Patient reports that he just returned from his cruise and denied experiencing any issues with his hip while away  States that he had some knee pain today, however states that it's his usual "arthritis pain"  Objective: See treatment diary below      Assessment: Tolerated treatment well  Progressed patient with addition of box lifts and carries from elevated surface this session to facilitate improved hip strengthening functionally with anticipated return to work  Patient able to complete without reports of increased pain in hip, however did note pain in knee  Patient demonstrated fatigue post treatment, exhibited good technique with therapeutic exercises and would benefit from continued PT      Plan: Continue per plan of care  Precautions: HTN, gout   R posterior ARIA 10/13/22, ok to move past precautions  Access Code: [email or web address]     Manuals 11/18 11/21 11/25 12/6 10/26 10/31 11/3 11/7 11/10 11/16   R hip PROM  BE BE BE BE KT BE SC BE BE BE   R hip flexor, adductor, hamstring stretch BE BE BE  KT BE SC BE BE                              Neuro Re-Ed             Glute set with bridge     5" 2x10 5" 3x10 5" 30x  5" 3x10 5" 3x10 with biodex foam     Sidelying SLR  3x10   10x pillow bw knees 2x10 V/T cues  2x10 V/T cues 2x10 V/T cues 3x10 3x10   Clamshells   GTB 3" 3x10      2x10  VCs 3" 2x10 GTB 3" 3x10   Lateral walks with TB GTB x3 laps GTB x3 laps GTB x3 laps     RTB x2 laps  RTB x3 laps  GTB x3 laps   Monster walks with TB fwd/bwd GTB x3 laps GTB x3 laps GTB x3 laps      RTB x3 laps  GTB x3 laps    Rockerboard taps A/P, M/L with holds 2x20 ea dir with 2x30" holds 2x20 ea dir with 2x30" holds 2x20 ea dir with 2x30" holds 2x20 ea dir with 2x30" holds      NV   Hurdles fwd and lat step overs with foam   NV x3 laps with 4 foam pads (blue, green, 1 black) x3 laps with 4 foam pads (blue, green, 1 black)      NV   R SLS 2x20"  2x20"           Semi tandem stance 2x30" biodex foam 2x30" biodex foam With rebounder toss biodex foam x30 ea RMB  With rebounder toss biodex foam x30 ea RMB         Slider ex   x6  x6          Backward lunge with slider  NV NV x10 B/L         Ther Ex             Nustep- hip flex <90* rec L6 10' Seat 13 L7 10'  Seat 13 L7 10' Seat 13   L5 10' Seat 13   L5 10' Seat 13 L5 10min  Seat 13 L6 10' Seat 13 L7 10' Seat 13 L7 10'   Treadmill for cardiovascular endurance   1 2 mph 10'          Standing hip 3 ways     RTB 2x10 ea RTB 2x10 ea RTB 2x 10  RTB 3x10 ea      Supine SLR      2x10 2x 10  2x10 3x10 3x10                Pt education     SPC use, side sleeping w pillow between knees SPC usage, updated HEP      reassessment, PT POC                Ther Activity             TG squats    DC  L20 2x10   Not past 90* hip flex L22 2x 10 not past 90 degrees of hip flexion  L22 3x 10 not past 90 degrees of hip flexion  L22 3x 10 not past 90 degrees of hip flexion L22 3x 10 not past 90 degrees of hip flexion   Fwd step ups  8" 3x10 8" 3x10  6" 2x10 6" 3x10 6" 3x 10  6" 3x 10  8" 3x10  8" 3x10    Lateral step ups   8" 3x10 8" 3x10  6" 2x10 6" 3x10 6" 3x 10  6" 3x 10  8" 3x10 8" 3x10    Sit to stands with KB  15# 2x10 15# 2x10 15# 3x10          Forward lunges   NV  2x10 B/L 2x10 B/L         [place] carry with DBs and squats     NV         Box lifts from elevated surface    17# box from step stool 2x10          Box carry     17# box from step stool height with 10' walk x3 laps          Gait Training                          Modalities

## 2022-12-07 ENCOUNTER — APPOINTMENT (OUTPATIENT)
Dept: RADIOLOGY | Facility: CLINIC | Age: 62
End: 2022-12-07

## 2022-12-07 ENCOUNTER — OFFICE VISIT (OUTPATIENT)
Dept: OBGYN CLINIC | Facility: CLINIC | Age: 62
End: 2022-12-07

## 2022-12-07 VITALS
HEART RATE: 72 BPM | SYSTOLIC BLOOD PRESSURE: 108 MMHG | WEIGHT: 262 LBS | BODY MASS INDEX: 37.51 KG/M2 | DIASTOLIC BLOOD PRESSURE: 70 MMHG | HEIGHT: 70 IN | OXYGEN SATURATION: 98 %

## 2022-12-07 DIAGNOSIS — G89.4 CHRONIC PAIN SYNDROME: ICD-10-CM

## 2022-12-07 DIAGNOSIS — Z96.641 STATUS POST RIGHT HIP REPLACEMENT: ICD-10-CM

## 2022-12-07 DIAGNOSIS — Z48.89 AFTERCARE FOLLOWING SURGERY: ICD-10-CM

## 2022-12-07 DIAGNOSIS — Z48.89 AFTERCARE FOLLOWING SURGERY: Primary | ICD-10-CM

## 2022-12-07 NOTE — LETTER
December 7, 2022     Patient: Radha Charles  YOB: 1960  Date of Visit: 12/7/2022      To Whom it May Concern:    Pedro Pablo Jasmine is under my professional care  Ijeoma Koroma was seen in my office on 12/7/2022  Rondalouise Koroma may return full work duty on 1/9/23  If you have any questions or concerns, please don't hesitate to call           Sincerely,          Cecille Salmon MD

## 2022-12-07 NOTE — PROGRESS NOTES
HPI:  Patient is a 63y o  year old  male  who presents for follow up evaluation approximately 8 weeks status post right total hip arthroplasty preformed on 10/13/22  He is pleased with his progress thus far and is completing physical therapy to improve his range of motion and strength  Patient reports he has recently started increasing his weights at physical therapy and is working on returning to work  He notes no pain in the hip at this time         ROS:   General: No fever, no chills, no weight loss, no weight gain  HEENT:  No loss of hearing, no nose bleeds, no sore throat  Eyes:  No eye pain, no red eyes, no visual disturbance  Respiratory:  No cough, no shortness of breath, no wheezing  Cardiovascular:  No chest pain, no palpitations, no edema  GI: No abdominal pain, no nausea, no vomiting  Endocrine: No frequent urination, no excessive thirst  Urinary:  No dysuria, no hematuria, no incontinence  Musculoskeletal: see HPI and PE  Skin:  No rash, no wounds  Neurological:  No dizziness, no headache, no numbness  Psychiatric:  No difficulty concentrating, no depression, no suicide thoughts, no anxiety  Review of all other systems is negative    PMH:  Past Medical History:   Diagnosis Date   • Arthritis    • Gout    • Hypertension        PSH:  Past Surgical History:   Procedure Laterality Date   • BACK SURGERY      Discs   • CATARACT EXTRACTION     • KNEE SURGERY     • VT ARTHRP ACETBLR/PROX FEM PROSTC AGRFT/ALGRFT Right 10/13/2022    Procedure: ARTHROPLASTY HIP TOTAL;  Surgeon: Shivam Cavazos MD;  Location: [facility];  Service: Orthopedics   • ROTATOR CUFF REPAIR         Medications:  Current Outpatient Medications   Medication Sig Dispense Refill   • acetaminophen (TYLENOL) 325 mg tablet Take 2 tablets (650 mg total) by mouth every 6 (six) hours as needed for mild pain 90 tablet 0   • colchicine (COLCRYS) 0 6 mg tablet TAKE 1 TABLET TWICE A DAY AND EVERY 3 HOURS AS NEEDED FOR GOUT AS DIRECTED 200 tablet 3   • indomethacin (INDOCIN) 50 mg capsule Take 1 capsule (50 mg total) by mouth 3 (three) times a day with meals 30 capsule 0   • levocetirizine (XYZAL) 5 MG tablet Take 5 mg by mouth every evening      • lisinopril (ZESTRIL) 20 mg tablet Take 1 tablet (20 mg total) by mouth daily 90 tablet 5   • Melatonin 1 MG CAPS Take 1 mg by mouth in the morning     • Multiple Vitamins-Minerals (multivitamin with minerals) tablet Take 1 tablet by mouth daily 30 tablet 0   • omeprazole (PriLOSEC) 40 MG capsule TAKE 1 CAPSULE DAILY 90 capsule 3   • rOPINIRole (REQUIP) 1 mg tablet TAKE 1 TABLET DAILY AT BEDTIME 90 tablet 3   • folic acid (FOLVITE) 1 mg tablet Take 1 tablet (1 mg total) by mouth daily (Patient not taking: Reported on 12/7/2022) 30 tablet 1   • oxyCODONE-acetaminophen (Percocet)  mg per tablet Take 1 tablet by mouth every 4 (four) hours as needed for severe pain Max Daily Amount: 6 tablets 90 tablet 0     Current Facility-Administered Medications   Medication Dose Route Frequency Provider Last Rate Last Admin   • cyanocobalamin injection 1,000 mcg  1,000 mcg Intramuscular Q30 Days Felix Jimenez MD   1,000 mcg at 05/11/18 1643       Allergies:  No Known Allergies    Family History:  Family History   Problem Relation Age of Onset   • Esophageal cancer Father    • Lung cancer Father    • Pancreatic cancer Father    • Thyroid nodules Father    • Stroke Family        Social History:  Social History     Occupational History     Comment: employed   Tobacco Use   • Smoking status: Former     Types: Cigars   • Smokeless tobacco: Never   Vaping Use   • Vaping Use: Never used   Substance and Sexual Activity   • Alcohol use:  Yes     Alcohol/week: 1 0 standard drink     Types: 1 Cans of beer per week     Comment: occasional   • Drug use: No   • Sexual activity: Yes     Partners: Female       Physical Exam:  General :  Alert, cooperative, no distress, appears stated age  Blood pressure 108/70, pulse 72, height 5' 10" (1 778 m), weight 119 kg (262 lb), SpO2 98 %  Head:  Normocephalic, without obvious abnormality, atraumatic   Eyes:  Conjunctiva/corneas clear, EOM's intact,   Ears: Both ears normal appearance, no hearing deficits  Nose: Nares normal, septum midline, no drainage    Neck: Supple,  trachea midline, no adenopathy, no tenderness, no mass   Back:   Symmetric, no curvature, ROM normal, no tenderness   Lungs:   Respirations unlabored   Chest Wall:  No tenderness or deformity   Extremities: Extremities normal, atraumatic, no cyanosis or edema      Pulses: 2+ and symmetric   Skin: Skin color, texture, turgor normal, no rashes or lesions      Neurologic: Normal           Right Hip Exam     Tenderness   The patient is experiencing no tenderness  Range of Motion   The patient has normal right hip ROM  Abduction: normal   Adduction: normal   Extension: normal   Flexion: normal   Internal rotation: normal     Muscle Strength   The patient has normal right hip strength  Tests   YESENIA: negative    Other   Erythema: absent  Scars: present  Sensation: normal  Pulse: present    Comments:  Surgical incision is well healed              Imaging Studies: The following imaging studies were reviewed in office today  My findings are noted  XR of the right hip taken on 12/7/22 demonstrates surgical implant is well aligned  No signs of prosthesis loosening, periprosthetic fracture or interval complications  No fracture or dislocation  No lytic or blastic lesions  Assessment  Encounter Diagnoses   Name Primary? • Aftercare following surgery Yes   • Status post right hip replacement          Plan: Navneet Newell is a 63 y o   male who presents approximately 8 weeks s/p RT ARIA  DOS: 10/13/22  Doing well     · Continue with physical therapy   · Educated patient that he can begin to push and increase his external rotation range of motion  · I recommend he may return to full work duty on 1/9  · FU in 3 months for repeat evaluation · Repeat XR at next visit     Scribe Attestation    I,:  Angie Shine am acting as a scribe while in the presence of the attending physician :       I,:  Elida Childress MD personally performed the services described in this documentation    as scribed in my presence :

## 2022-12-08 ENCOUNTER — OFFICE VISIT (OUTPATIENT)
Dept: PHYSICAL THERAPY | Facility: CLINIC | Age: 62
End: 2022-12-08

## 2022-12-08 DIAGNOSIS — G89.29 CHRONIC PAIN OF RIGHT HIP: ICD-10-CM

## 2022-12-08 DIAGNOSIS — M16.11 PRIMARY OSTEOARTHRITIS OF ONE HIP, RIGHT: ICD-10-CM

## 2022-12-08 DIAGNOSIS — G89.4 CHRONIC PAIN SYNDROME: ICD-10-CM

## 2022-12-08 DIAGNOSIS — Z96.641 S/P TOTAL RIGHT HIP ARTHROPLASTY: Primary | ICD-10-CM

## 2022-12-08 DIAGNOSIS — M25.551 CHRONIC PAIN OF RIGHT HIP: ICD-10-CM

## 2022-12-08 RX ORDER — OXYCODONE AND ACETAMINOPHEN 10; 325 MG/1; MG/1
1 TABLET ORAL EVERY 4 HOURS PRN
Qty: 90 TABLET | Refills: 0 | Status: SHIPPED | OUTPATIENT
Start: 2022-12-08

## 2022-12-08 NOTE — PROGRESS NOTES
Daily Note     Today's date: 2022  Patient name: Kulwant Sage  : 1960  MRN: [id number]  Referring provider: Oksana Fermin MD  Dx:   Encounter Diagnosis     ICD-10-CM    1  S/P total right hip arthroplasty  Z96 641       2  Primary osteoarthritis of one hip, right  M16 11       3  Chronic pain of right hip  M25 551     G89 29                      Subjective: Patient stated no significant pain prior to treatment session  Objective: See treatment diary below      Assessment: Patient performed exercises without c/o pain; no c/o pain with manual hip PROM  Plan: Continue per plan of care  Precautions: HTN, gout   R posterior ARIA 10/13/22, ok to move past precautions  Access Code: [email or web address]     Manuals 11/18 11/21 11/25 12/6 12/8 10/31 11/3 11/7 11/10 11/16   R hip PROM  BE BE BE BE KK BE SC BE BE BE   R hip flexor, adductor, hamstring stretch BE BE BE   BE SC BE BE                              Neuro Re-Ed             Glute set with bridge      5" 3x10 5" 30x  5" 3x10 5" 3x10 with biodex foam     Sidelying SLR  3x10    2x10 V/T cues  2x10 V/T cues 2x10 V/T cues 3x10 3x10   Clamshells   GTB 3" 3x10      2x10  VCs 3" 2x10 GTB 3" 3x10   Lateral walks with TB GTB x3 laps GTB x3 laps GTB x3 laps     RTB x2 laps  RTB x3 laps  GTB x3 laps   Monster walks with TB fwd/bwd GTB x3 laps GTB x3 laps GTB x3 laps      RTB x3 laps  GTB x3 laps    Rockerboard taps A/P, M/L with holds  2x20 ea dir with 2x30" holds 2x20 ea dir with 2x30" holds 2x20 ea dir with 2x30" holds 2x20 ea dir with 2x30" holds 2x20 ea dir with 2x30" holds     NV   Hurdles fwd and lat step overs with foam   NV x3 laps with 4 foam pads (blue, green, 1 black) x3 laps with 4 foam pads (blue, green, 1 black) x3 laps with 4 foam pads (blue, green, 1 black)     NV   R SLS 2x20"  2x20"           Semi tandem stance 2x30" biodex foam 2x30" biodex foam With rebounder toss biodex foam x30 ea RMB  With rebounder toss biodex foam x30 ea RMB With rebounder toss biodex foam x30 ea RMB        Slider ex   x6  x6  6x        Backward lunge with slider  NV NV x10 B/L x10 B/L        Ther Ex             Nustep- hip flex <90* rec L6 10' Seat 13 L7 10'  Seat 13 L7 10' Seat 13 L8  L5 10' Seat 13   L5 10' Seat 13 L5 10min  Seat 13 L6 10' Seat 13 L7 10' Seat 13 L7 10'   Treadmill for cardiovascular endurance   1 2 mph 10'          Standing hip 3 ways      RTB 2x10 ea RTB 2x 10  RTB 3x10 ea      Supine SLR      2x10 2x 10  2x10 3x10 3x10                Pt education      SPC usage, updated HEP      reassessment, PT POC                Ther Activity             TG squats    DC  L20 2x10   Not past 90* hip flex L22 2x 10 not past 90 degrees of hip flexion  L22 3x 10 not past 90 degrees of hip flexion  L22 3x 10 not past 90 degrees of hip flexion L22 3x 10 not past 90 degrees of hip flexion   Fwd step ups  8" 3x10 8" 3x10   6" 3x10 6" 3x 10  6" 3x 10  8" 3x10  8" 3x10    Lateral step ups   8" 3x10 8" 3x10   6" 3x10 6" 3x 10  6" 3x 10  8" 3x10 8" 3x10    Sit to stands with KB  15# 2x10 15# 2x10 15# 3x10          Forward lunges   NV  2x10 B/L 2x10 B/L         [place] carry with DBs and squats     NV         Box lifts from elevated surface    17# box from step stool 2x10  22# box from step stool 2x10         Box carry     17# box from step stool height with 10' walk x3 laps  22# box from step stool height with 10' walk x3 laps         Gait Training                          Modalities

## 2022-12-10 RX ORDER — OXYCODONE AND ACETAMINOPHEN 10; 325 MG/1; MG/1
1 TABLET ORAL EVERY 4 HOURS PRN
Qty: 90 TABLET | Refills: 0 | OUTPATIENT
Start: 2022-12-10

## 2022-12-12 ENCOUNTER — OFFICE VISIT (OUTPATIENT)
Dept: PHYSICAL THERAPY | Facility: CLINIC | Age: 62
End: 2022-12-12

## 2022-12-12 DIAGNOSIS — G89.29 CHRONIC PAIN OF RIGHT HIP: ICD-10-CM

## 2022-12-12 DIAGNOSIS — Z96.641 S/P TOTAL RIGHT HIP ARTHROPLASTY: Primary | ICD-10-CM

## 2022-12-12 DIAGNOSIS — M16.11 PRIMARY OSTEOARTHRITIS OF ONE HIP, RIGHT: ICD-10-CM

## 2022-12-12 DIAGNOSIS — M25.551 CHRONIC PAIN OF RIGHT HIP: ICD-10-CM

## 2022-12-12 NOTE — PROGRESS NOTES
Daily Note     Today's date: 2022  Patient name: Janiya Garner  : 1960  MRN: [id number]  Referring provider: Paula Alatorre MD  Dx:   Encounter Diagnosis     ICD-10-CM    1  S/P total right hip arthroplasty  Z96 641       2  Primary osteoarthritis of one hip, right  M16 11       3  Chronic pain of right hip  M25 551     G89 29           Start Time: 0945  Stop Time: 1023  Total time in clinic (min): 38 minutes    Subjective: Pt noted that he has no change in R hip p! Status  Pt noted that he does back to work on 22 (full duty )     Objective: See treatment diary below      Assessment: Continued with treatment session, progressed weight with box lifts and carry's this visit with no increase in pain in R hip  Tolerated treatment well  Patient exhibited good technique with therapeutic exercises and would benefit from continued PT  S/p treatment session, pt noted no new changes  Plan: Continue per plan of care  Precautions: HTN, gout   R posterior ARIA 10/13/22, ok to move past precautions  Access Code: [email or web address]     Manuals 11/18 11/21 11/25 12/6 12/8 12/12  11/7 11/10 11/16   R hip PROM  BE BE BE BE KK SC  BE BE BE   R hip flexor, adductor, hamstring stretch BE BE BE     BE BE                              Neuro Re-Ed             Glute set with bridge        5" 3x10 5" 3x10 with biodex foam     Sidelying SLR  3x10      2x10 V/T cues 3x10 3x10   Clamshells   GTB 3" 3x10      2x10  VCs 3" 2x10 GTB 3" 3x10   Lateral walks with TB GTB x3 laps GTB x3 laps GTB x3 laps     RTB x2 laps  RTB x3 laps  GTB x3 laps   Monster walks with TB fwd/bwd GTB x3 laps GTB x3 laps GTB x3 laps      RTB x3 laps  GTB x3 laps    Rockerboard taps A/P, M/L with holds  2x20 ea dir with 2x30" holds 2x20 ea dir with 2x30" holds 2x20 ea dir with 2x30" holds 2x20 ea dir with 2x30" holds 2x20 ea dir with 2x30" holds 2x20 ea dir with 2x30" holds    NV   Hurdles fwd and lat step overs with foam   NV x3 laps with 4 foam pads (blue, green, 1 black) x3 laps with 4 foam pads (blue, green, 1 black) x3 laps with 4 foam pads (blue, green, 1 black) x3 laps with 4 foam pads (blue, green, 1 black)    NV   R SLS 2x20"  2x20"           Semi tandem stance 2x30" biodex foam 2x30" biodex foam With rebounder toss biodex foam x30 ea RMB  With rebounder toss biodex foam x30 ea RMB With rebounder toss biodex foam x30 ea RMB With rebounder toss biodex foam x30 ea RMB       Slider ex   x6  x6  6x 6x        Backward lunge with slider  NV NV x10 B/L x10 B/L 10 x b/l                                                            Ther Ex             Nustep- hip flex <90* rec L6 10' Seat 13 L7 10'  Seat 13 L7 10' Seat 13 L8  L5 10' Seat 13 L8  L5 10'  Seat 13 L6 10' Seat 13 L7 10' Seat 13 L7 10'   Treadmill for cardiovascular endurance   1 2 mph 10'          Standing hip 3 ways        RTB 3x10 ea      Supine SLR        2x10 3x10 3x10                Pt education          reassessment, PT POC                                                                                 Ther Activity             TG squats    DC    L22 3x 10 not past 90 degrees of hip flexion  L22 3x 10 not past 90 degrees of hip flexion L22 3x 10 not past 90 degrees of hip flexion   Fwd step ups  8" 3x10 8" 3x10     6" 3x 10  8" 3x10  8" 3x10    Lateral step ups   8" 3x10 8" 3x10     6" 3x 10  8" 3x10 8" 3x10    Sit to stands with KB  15# 2x10 15# 2x10 15# 3x10          Forward lunges   NV  2x10 B/L 2x10 B/L         [place] carry with DBs and squats     NV         Box lifts from elevated surface    17# box from step stool 2x10  22# box from step stool 2x10  32# box from step to stool 2x 10        Box carry     17# box from step stool height with 10' walk x3 laps  22# box from step stool height with 10' walk x3 laps  32# box from step to stool 10' walk and 3 laps        Gait Training                          Modalities

## 2022-12-15 ENCOUNTER — APPOINTMENT (OUTPATIENT)
Dept: PHYSICAL THERAPY | Facility: CLINIC | Age: 62
End: 2022-12-15

## 2022-12-19 ENCOUNTER — EVALUATION (OUTPATIENT)
Dept: PHYSICAL THERAPY | Facility: CLINIC | Age: 62
End: 2022-12-19

## 2022-12-19 DIAGNOSIS — G89.29 CHRONIC PAIN OF RIGHT HIP: ICD-10-CM

## 2022-12-19 DIAGNOSIS — Z96.641 S/P TOTAL RIGHT HIP ARTHROPLASTY: Primary | ICD-10-CM

## 2022-12-19 DIAGNOSIS — M25.551 CHRONIC PAIN OF RIGHT HIP: ICD-10-CM

## 2022-12-19 DIAGNOSIS — M16.11 PRIMARY OSTEOARTHRITIS OF ONE HIP, RIGHT: ICD-10-CM

## 2022-12-19 NOTE — PROGRESS NOTES
PT Re-Evaluation     Today's date: 2022  Patient name: Siddhartha Giang  : 1960  MRN: [id number]  Referring provider: Chel Aguilera MD  Dx:   Encounter Diagnosis     ICD-10-CM    1  S/P total right hip arthroplasty  Z96 641       2  Primary osteoarthritis of one hip, right  M16 11       3  Chronic pain of right hip  M25 551     G89 29                      Assessment  Assessment details: Patient demonstrates improved strength, improved ROM, and improved functional ability since last PT re-evaluation  Patient continues to demonstrate mild strength deficits of the right hip at the current time  Patient would benefit from continued skilled PT treatment to address remaining deficits and to facilitate return to prior level of function  Patient states he wishes to continue PT treatment until he returns to work on 23 and then wishes to continue exercises independently at home at that time  Goals  STG to be achieved in 4 weeks: The patient will report a decrease in right hip "at worst" subjective pain rating score to at least 5/10 to allow for improved tolerance for weightbearing activities  MET  The patient will increase right hip flexion AROM to at least 60 degrees to allow for improved functional mobility  MET  The patient will increase right hip flexion MMT score to at least 4/5 to allow for improved functional mobility  MET    LTG to be achieved by DC: The patient will be independent in comprehensive HEP  MET  The patient will report no pain with usual activities - partially met  The patient will improve right hip AROM to [facility] to allow for improved functional mobility  MET  The patient will increase right hip MMT score to [facility] to allow for improved functional mobility  MET  The patient will be able to ambulate one mile without pain or difficulty to allow for improved functional mobility and return to work   MET  The patient will be able to return to squatting and lifting 60lbs without pain or difficulty to allow for return to work - partially met  The patient will be able to stand for >4 hours at a time without pain or difficulty to allow for return to work - partially met  The patient will improve TUG and 5 STS functional assessments <10 seconds demonstrating improved functional mobility and hip strength - Both met    Plan  Planned therapy interventions: manual therapy, neuromuscular re-education, patient education, strengthening, stretching, therapeutic activities, therapeutic exercise and home exercise program  Frequency: 2x week  Duration in weeks: 3  Plan of Care beginning date: 2022  Plan of Care expiration date: 2023  Treatment plan discussed with: patient        Subjective Evaluation    History of Present Illness  Mechanism of injury: Patient states he feels approximately 80-85% improved at the current time  Patient states some continued difficulty with donning/doffing socks and shoes  Patient states he was released to return to work on 23    Patient states he wishes to continue PT treatment until he returns to work, and then wishes to continue   Pain  Current pain ratin  At best pain ratin  At worst pain ratin          Objective     General Comments:      Hip Comments   Active Range of Motion   Left Hip   Flexion: 89 degrees   Abduction: 25 degrees   External rotation (90/90): 23 degrees   Internal rotation (90/90): 15 degrees      Right Hip   Flexion: 100 degrees with pain  Abduction: 35 degrees   External rotation (90/90): 40 degrees with pain  Internal rotation (90/90): 30 degrees with pain     Passive Range of Motion      Right Hip   Flexion: 105 degrees with pain  Abduction: 40 degrees   External rotation (90/90): 45 degrees with pain  Internal rotation (90/90): 35 degrees with pain     Strength/Myotome Testing      Left Hip   Planes of Motion   Flexion: 4+  Extension: 4  Abduction: 4+  External rotation: 4+  Internal rotation: 4+     Right Hip   Planes of Motion   Flexion: 4+  Extension: 4  Abduction: 4  External rotation: 4  Internal rotation: 4     Ambulation      Ambulation: Level Surfaces   Ambulation without assistive device: independent     Functional Assessment  Comments:  5 STS- 9 4 seconds, no UE assistance needed for push off, good eccentric control noted with sitting   TUG- 6 2 seconds, no UE assistance needed for push off, good eccentric control noted with sitting, no AD             Precautions: HTN, gout   R posterior ARIA 10/13/22, ok to move past precautions  Access Code: [email or web address]      Manuals 11/18 11/21 11/25 12/6 12/8 12/12  12/19 11/7 11/10 11/16   R hip PROM  BE BE BE BE KK SC  KK BE BE BE   R hip flexor, adductor, hamstring stretch BE BE BE         BE BE                             Re-eval              KK         Neuro Re-Ed                       Glute set with bridge               5" 3x10 5" 3x10 with biodex foam      Sidelying SLR   3x10           2x10 V/T cues 3x10 3x10   Clamshells    GTB 3" 3x10           2x10  VCs 3" 2x10 GTB 3" 3x10   Lateral walks with TB GTB x3 laps GTB x3 laps GTB x3 laps         RTB x2 laps  RTB x3 laps  GTB x3 laps   Monster walks with TB fwd/bwd GTB x3 laps GTB x3 laps GTB x3 laps           RTB x3 laps  GTB x3 laps    Rockerboard taps A/P, M/L with holds  2x20 ea dir with 2x30" holds 2x20 ea dir with 2x30" holds 2x20 ea dir with 2x30" holds 2x20 ea dir with 2x30" holds 2x20 ea dir with 2x30" holds 2x20 ea dir with 2x30" holds  2x20 ea dir with 2x30" holds     NV   Hurdles fwd and lat step overs with foam    NV x3 laps with 4 foam pads (blue, green, 1 black) x3 laps with 4 foam pads (blue, green, 1 black) x3 laps with 4 foam pads (blue, green, 1 black) x3 laps with 4 foam pads (blue, green, 1 black)  x3 laps with 4 foam pads (blue, green, 1 black)     NV   R SLS 2x20"  2x20"                   Semi tandem stance 2x30" biodex foam 2x30" biodex foam With rebounder toss biodex foam x30 ea RMB  With rebounder toss biodex foam x30 ea RMB With rebounder toss biodex foam x30 ea RMB With rebounder toss biodex foam x30 ea RMB With rebounder toss biodex foam x30 ea RMB         Slider ex     x6  x6  6x 6x   np         Backward lunge with slider   NV NV x10 B/L x10 B/L 10 x b/l   np                                                                                                         Ther Ex                       Nustep- hip flex <90* rec L6 10' Seat 13 L7 10'   Seat 13 L7 10' Seat 13 L8  L5 10' Seat 13 L8  L5 10'  Seat 13 L8  L5 10' Seat 13 L6 10' Seat 13 L7 10' Seat 13 L7 10'   Treadmill for cardiovascular endurance     1 2 mph 10'                Standing hip 3 ways               RTB 3x10 ea        Supine SLR               2x10 3x10 3x10                           Pt education                   reassessment, PT POC                                                                                                                                                   Ther Activity                       TG squats       DC       L22 3x 10 not past 90 degrees of hip flexion  L22 3x 10 not past 90 degrees of hip flexion L22 3x 10 not past 90 degrees of hip flexion   Fwd step ups   8" 3x10 8" 3x10         6" 3x 10  8" 3x10  8" 3x10    Lateral step ups    8" 3x10 8" 3x10         6" 3x 10  8" 3x10 8" 3x10    Sit to stands with KB  15# 2x10 15# 2x10 15# 3x10                 Forward lunges    NV  2x10 B/L 2x10 B/L               [place] carry with DBs and squats        NV               Box lifts from elevated surface       17# box from step stool 2x10  22# box from step stool 2x10  32# box from step to stool 2x 10   32# box from step to floor         Box carry        17# box from step stool height with 10' walk x3 laps  22# box from step stool height with 10' walk x3 laps  32# box from step to stool 10' walk and 3 laps   32# box from step to stool 10' walk and 5 laps          Gait Training                                               Modalities

## 2022-12-22 ENCOUNTER — OFFICE VISIT (OUTPATIENT)
Dept: PHYSICAL THERAPY | Facility: CLINIC | Age: 62
End: 2022-12-22

## 2022-12-22 DIAGNOSIS — M25.551 CHRONIC PAIN OF RIGHT HIP: ICD-10-CM

## 2022-12-22 DIAGNOSIS — Z96.641 S/P TOTAL RIGHT HIP ARTHROPLASTY: Primary | ICD-10-CM

## 2022-12-22 DIAGNOSIS — G89.29 CHRONIC PAIN OF RIGHT HIP: ICD-10-CM

## 2022-12-22 DIAGNOSIS — M16.11 PRIMARY OSTEOARTHRITIS OF ONE HIP, RIGHT: ICD-10-CM

## 2022-12-22 NOTE — PROGRESS NOTES
Daily Note     Today's date: 2022  Patient name: Josué Patel  : 1960  MRN: [id number]  Referring provider: Shashank Horne MD  Dx:   Encounter Diagnosis     ICD-10-CM    1  S/P total right hip arthroplasty  Z96 641       2  Primary osteoarthritis of one hip, right  M16 11       3  Chronic pain of right hip  M25 551     G89 29                      Subjective: Patient stated no significant pain prior to treatment session  Objective: See treatment diary below      Assessment: Patient demonstrated moderate challenge with progression of increased weight with box lifts/carry; no c/o at completion of treatment session  Plan: Continue per plan of care  Precautions: HTN, gout   R posterior ARIA 10/13/22, ok to move past precautions  Access Code: [email or web address]      Manuals 11/18 11/21 11/25 12/6 12/8 12/12  12/19 12/22 11/10 11/16   R hip PROM  BE BE BE BE KK SC  KK KK BE BE   R hip flexor, adductor, hamstring stretch BE BE BE          BE                             Re-eval              KK         Neuro Re-Ed                       Glute set with bridge                5" 3x10 with biodex foam      Sidelying SLR   3x10            3x10 3x10   Clamshells    GTB 3" 3x10            3" 2x10 GTB 3" 3x10   Lateral walks with TB GTB x3 laps GTB x3 laps GTB x3 laps         RTB x3 laps  GTB x3 laps   Monster walks with TB fwd/bwd GTB x3 laps GTB x3 laps GTB x3 laps          RTB x3 laps  GTB x3 laps    Rockerboard taps A/P, M/L with holds  2x20 ea dir with 2x30" holds 2x20 ea dir with 2x30" holds 2x20 ea dir with 2x30" holds 2x20 ea dir with 2x30" holds 2x20 ea dir with 2x30" holds 2x20 ea dir with 2x30" holds  2x20 ea dir with 2x30" holds  2x20 ea dir with 2x30" holds   NV   Hurdles fwd and lat step overs with foam    NV x3 laps with 4 foam pads (blue, green, 1 black) x3 laps with 4 foam pads (blue, green, 1 black) x3 laps with 4 foam pads (blue, green, 1 black) x3 laps with 4 foam pads (blue, green, 1 black)  x3 laps with 4 foam pads (blue, green, 1 black)  x3 laps with 4 foam pads (blue, green, 1 black)   NV   R SLS 2x20"  2x20"                   Semi tandem stance 2x30" biodex foam 2x30" biodex foam With rebounder toss biodex foam x30 ea RMB  With rebounder toss biodex foam x30 ea RMB With rebounder toss biodex foam x30 ea RMB With rebounder toss biodex foam x30 ea RMB With rebounder toss biodex foam x30 ea RMB  With rebounder toss biodex foam x30 ea RMB      Slider ex     x6  x6  6x 6x   np         Backward lunge with slider   NV NV x10 B/L x10 B/L 10 x b/l   np                                                                                                         Ther Ex                       Nustep- hip flex <90* rec L6 10' Seat 13 L7 10'   Seat 13 L7 10' Seat 13 L8  L5 10' Seat 13 L8  L5 10'  Seat 13 L8  L5 10' Seat 13 L6 10' Seat 13 L7 10' Seat 13 L7 10'   Treadmill for cardiovascular endurance     1 2 mph 10'                Standing hip 3 ways                      Supine SLR                3x10 3x10                           Pt education                   reassessment, PT POC                                                                                                                                                   Ther Activity                       TG squats       DC        L22 3x 10 not past 90 degrees of hip flexion L22 3x 10 not past 90 degrees of hip flexion   Fwd step ups   8" 3x10 8" 3x10          8" 3x10  8" 3x10    Lateral step ups    8" 3x10 8" 3x10          8" 3x10 8" 3x10    Sit to stands with KB  15# 2x10 15# 2x10 15# 3x10                 Forward lunges    NV  2x10 B/L 2x10 B/L               [place] carry with DBs and squats        NV               Box lifts from elevated surface       17# box from step stool 2x10  22# box from step stool 2x10  32# box from step to stool 2x 10   32# box from step to floor  40# floor to waist      Box carry        17# box from step stool height with 10' walk x3 laps  22# box from step stool height with 10' walk x3 laps  32# box from step to stool 10' walk and 3 laps   32# box from step to stool 10' walk and 5 laps   40# box from step to stool 10' walk and 5 laps        Gait Training                                               Modalities

## 2022-12-27 ENCOUNTER — APPOINTMENT (OUTPATIENT)
Dept: PHYSICAL THERAPY | Facility: CLINIC | Age: 62
End: 2022-12-27

## 2022-12-29 ENCOUNTER — APPOINTMENT (OUTPATIENT)
Dept: PHYSICAL THERAPY | Facility: CLINIC | Age: 62
End: 2022-12-29

## 2023-01-09 DIAGNOSIS — G89.4 CHRONIC PAIN SYNDROME: ICD-10-CM

## 2023-01-09 RX ORDER — OXYCODONE AND ACETAMINOPHEN 10; 325 MG/1; MG/1
1 TABLET ORAL EVERY 4 HOURS PRN
Qty: 90 TABLET | Refills: 0 | Status: SHIPPED | OUTPATIENT
Start: 2023-01-09

## 2023-01-09 NOTE — TELEPHONE ENCOUNTER
Medication:  PDMP    [id number] 12/08/2022 12/08/2022 oxyCODONE HCL-ACETAMINOPHEN (Tablet)  90 0 15 325 MG-10 MG 90 0 [id number]  Other 0 / 0 PA    1  [id number] 11/07/2022 11/07/2022 oxyCODONE HCL-ACETAMINOPHEN (Tablet)  90 0 15 325 MG-10 MG 90 0 [id number]  Other 0 / 0 PA    1  [id number] 10/06/2022  10/06/2022 oxyCODONE HCL-ACETAMINOPHEN (Tablet)  90 0 15 325 MG-10 MG 90 0 [id number]  Other 0 / 0 PA      Active agreement on file -Yes

## 2023-01-14 DIAGNOSIS — G25.81 RLS (RESTLESS LEGS SYNDROME): ICD-10-CM

## 2023-01-14 RX ORDER — ROPINIROLE 1 MG/1
TABLET, FILM COATED ORAL
Qty: 90 TABLET | Refills: 3 | Status: SHIPPED | OUTPATIENT
Start: 2023-01-14

## 2023-02-07 DIAGNOSIS — G89.4 CHRONIC PAIN SYNDROME: ICD-10-CM

## 2023-02-07 RX ORDER — OXYCODONE AND ACETAMINOPHEN 10; 325 MG/1; MG/1
1 TABLET ORAL EVERY 4 HOURS PRN
Qty: 90 TABLET | Refills: 0 | Status: SHIPPED | OUTPATIENT
Start: 2023-02-07

## 2023-02-07 NOTE — TELEPHONE ENCOUNTER
Medication:  PDMP    [id number] 01/09/2023 01/09/2023 ACETAMINOPHEN 325 MG / oxyCODONE HYDROCHLORIDE 10 MG ORAL TABLET (Tablet)  90 0 15 10 0 MG/325 0 MG NA [id number]  Other 0 / 0 PA    1  [id number] 12/08/2022 12/08/2022 ACETAMINOPHEN 325 MG / oxyCODONE HYDROCHLORIDE 10 MG ORAL TABLET (Tablet)  90 0 15 10 0 MG/325 0 MG NA [id number]  Other 0 / 0 PA    1  [id number] 11/07/2022 11/07/2022 ACETAMINOPHEN 325 MG / oxyCODONE HYDROCHLORIDE 10 MG ORAL TABLET (Tablet)  90 0 15 10 0 MG/325 0 MG NA [id number]  Other 0 / 0 PA    1  [id number] 10/06/2022  10/06/2022 ACETAMINOPHEN 325 MG / oxyCODONE HYDROCHLORIDE 10 MG ORAL TABLET (Tablet)  90 0 15 10 0 MG/325 0 MG [facility]          Active agreement on file -Yes

## 2023-02-16 ENCOUNTER — RA CDI HCC (OUTPATIENT)
Dept: OTHER | Facility: HOSPITAL | Age: 63
End: 2023-02-16

## 2023-02-16 ENCOUNTER — VBI (OUTPATIENT)
Dept: ADMINISTRATIVE | Facility: OTHER | Age: 63
End: 2023-02-16

## 2023-02-16 NOTE — PROGRESS NOTES
[facility]  coding opportunities          Chart Reviewed number of suggestions sent to Provider: 1     Patients Insurance        Commercial Insurance: [address]     [id number]  MD Carlos Dixon  [facility]

## 2023-02-23 ENCOUNTER — OFFICE VISIT (OUTPATIENT)
Dept: FAMILY MEDICINE CLINIC | Facility: CLINIC | Age: 63
End: 2023-02-23

## 2023-02-23 VITALS
HEIGHT: 70 IN | WEIGHT: 268 LBS | HEART RATE: 85 BPM | SYSTOLIC BLOOD PRESSURE: 138 MMHG | TEMPERATURE: 97.5 F | BODY MASS INDEX: 38.37 KG/M2 | DIASTOLIC BLOOD PRESSURE: 82 MMHG | OXYGEN SATURATION: 97 %

## 2023-02-23 DIAGNOSIS — E66.01 MORBID OBESITY (HCC): ICD-10-CM

## 2023-02-23 DIAGNOSIS — Z72.0 TOBACCO USE: ICD-10-CM

## 2023-02-23 DIAGNOSIS — M51.36 DDD (DEGENERATIVE DISC DISEASE), LUMBAR: ICD-10-CM

## 2023-02-23 DIAGNOSIS — M77.8 TENDONITIS OF SHOULDER, RIGHT: ICD-10-CM

## 2023-02-23 DIAGNOSIS — H61.23 BILATERAL IMPACTED CERUMEN: ICD-10-CM

## 2023-02-23 DIAGNOSIS — F11.20 CONTINUOUS OPIOID DEPENDENCE (HCC): ICD-10-CM

## 2023-02-23 DIAGNOSIS — G89.4 CHRONIC PAIN SYNDROME: ICD-10-CM

## 2023-02-23 DIAGNOSIS — M10.9 GOUT, UNSPECIFIED CAUSE, UNSPECIFIED CHRONICITY, UNSPECIFIED SITE: ICD-10-CM

## 2023-02-23 DIAGNOSIS — K21.9 GASTROESOPHAGEAL REFLUX DISEASE, UNSPECIFIED WHETHER ESOPHAGITIS PRESENT: ICD-10-CM

## 2023-02-23 DIAGNOSIS — I10 PRIMARY HYPERTENSION: Primary | ICD-10-CM

## 2023-02-23 DIAGNOSIS — G62.9 NEUROPATHY: ICD-10-CM

## 2023-02-23 RX ORDER — METHYLPREDNISOLONE ACETATE 80 MG/ML
1 INJECTION, SUSPENSION INTRA-ARTICULAR; INTRALESIONAL; INTRAMUSCULAR; SOFT TISSUE
Status: COMPLETED | OUTPATIENT
Start: 2023-02-23 | End: 2023-02-23

## 2023-02-23 RX ORDER — METHYLPREDNISOLONE ACETATE 80 MG/ML
0.5 INJECTION, SUSPENSION INTRA-ARTICULAR; INTRALESIONAL; INTRAMUSCULAR; SOFT TISSUE
Status: COMPLETED | OUTPATIENT
Start: 2023-02-23 | End: 2023-02-23

## 2023-02-23 RX ADMIN — METHYLPREDNISOLONE ACETATE 0.5 ML: 80 INJECTION, SUSPENSION INTRA-ARTICULAR; INTRALESIONAL; INTRAMUSCULAR; SOFT TISSUE at 09:03

## 2023-02-23 RX ADMIN — METHYLPREDNISOLONE ACETATE 1 ML: 80 INJECTION, SUSPENSION INTRA-ARTICULAR; INTRALESIONAL; INTRAMUSCULAR; SOFT TISSUE at 09:03

## 2023-02-23 NOTE — PROGRESS NOTES
Name: Radha Charles      : 1960      MRN: [id number]  Encounter Provider: Lazaro Snow MD  Encounter Date: 2023   [place] [address] [address] [place] [address]   Return visit in 4 months  1  Primary hypertension  Assessment & Plan:  Continue lisinopril 20 mg daily      2  Gastroesophageal reflux disease, unspecified whether esophagitis present  Assessment & Plan:  Continue Prilosec 40 mg daily      3  DDD (degenerative disc disease), lumbar  Assessment & Plan:  Continue Percocet 10 mg 3 times daily  Opioid agreement is on file and we did urine drug testing last visit  4  Chronic pain syndrome    5  Continuous opioid dependence ([facility] )    6  Morbid obesity ([facility] )    7  Tobacco use    8  Tendonitis of shoulder, right    9  Neuropathy    10  Bilateral impacted cerumen    11  Gout, unspecified cause, unspecified chronicity, unspecified site  Assessment & Plan:  Continue indomethacin as needed      BMI Counseling: Body mass index is 38 45 kg/m²  The BMI is above normal  Nutrition recommendations include decreasing portion sizes and moderation in carbohydrate intake  Exercise recommendations include exercising 3-5 times per week  No pharmacotherapy was ordered  Rationale for BMI follow-up plan is due to patient being overweight or obese  Depression Screening and Follow-up Plan: Patient was screened for depression during today's encounter  They screened negative with a PHQ-2 score of 0  Subjective      Patient comes in checkup  He complains of right shoulder pain, impacted cerumen and numbness of tingling in his hand  Since he was here last he had left hip replacement which she is doing well with  He continues to use oxycodone for chronic low back pain and has been on this regimen for several years  Review of Systems   Constitutional: Negative  HENT: Positive for hearing loss  Respiratory: Negative  Cardiovascular: Negative  Musculoskeletal: Positive for arthralgias and back pain  Current Outpatient Medications on File Prior to Visit   Medication Sig   • acetaminophen (TYLENOL) 325 mg tablet Take 2 tablets (650 mg total) by mouth every 6 (six) hours as needed for mild pain   • colchicine (COLCRYS) 0 6 mg tablet TAKE 1 TABLET TWICE A DAY AND EVERY 3 HOURS AS NEEDED FOR GOUT AS DIRECTED   • indomethacin (INDOCIN) 50 mg capsule Take 1 capsule (50 mg total) by mouth 3 (three) times a day with meals   • levocetirizine (XYZAL) 5 MG tablet Take 5 mg by mouth every evening    • lisinopril (ZESTRIL) 20 mg tablet Take 1 tablet (20 mg total) by mouth daily   • Melatonin 1 MG CAPS Take 1 mg by mouth in the morning   • Multiple Vitamins-Minerals (multivitamin with minerals) tablet Take 1 tablet by mouth daily   • omeprazole (PriLOSEC) 40 MG capsule TAKE 1 CAPSULE DAILY   • oxyCODONE-acetaminophen (Percocet)  mg per tablet Take 1 tablet by mouth every 4 (four) hours as needed for severe pain Max Daily Amount: 6 tablets   • rOPINIRole (REQUIP) 1 mg tablet TAKE 1 TABLET DAILY AT BEDTIME   • [DISCONTINUED] folic acid (FOLVITE) 1 mg tablet Take 1 tablet (1 mg total) by mouth daily (Patient not taking: Reported on 12/7/2022)       Objective     /82 (BP Location: Left arm, Patient Position: Sitting, Cuff Size: Large)   Pulse 85   Temp 97 5 °F (36 4 °C)   Ht 5' 10" (1 778 m)   Wt 122 kg (268 lb)   SpO2 97%   BMI 38 45 kg/m²     Physical Exam  Constitutional:       Appearance: He is well-developed  He is obese  HENT:      Head: Normocephalic and atraumatic  Right Ear: Tympanic membrane, ear canal and external ear normal  There is impacted cerumen  Left Ear: Tympanic membrane, ear canal and external ear normal  There is impacted cerumen  Eyes:      Pupils: Pupils are equal, round, and reactive to light  Cardiovascular:      Rate and Rhythm: Normal rate and regular rhythm  Heart sounds: Normal heart sounds  Pulmonary:      Effort: Pulmonary effort is normal       Breath sounds: Normal breath sounds  Musculoskeletal:      Cervical back: Neck supple  Comments: Painful flexion and abduction of right shoulder  Skin:     General: Skin is warm and dry  Neurological:      Mental Status: He is alert  Psychiatric:         Mood and Affect: Mood normal          Behavior: Behavior normal          Thought Content: Thought content normal      Large joint arthrocentesis: R glenohumeral  [place] Protocol:  Consent: Verbal consent obtained  Risks and benefits: risks, benefits and alternatives were discussed  Consent given by: patient  Patient identity confirmed: verbally with patient    Supporting Documentation  Indications: pain   Procedure Details  Location: shoulder - R glenohumeral  Preparation: Patient was prepped and draped in the usual sterile fashion  Needle size: 25 G  Ultrasound guidance: no  Approach: posterior  Medications administered: 0 5 mL methylPREDNISolone acetate 80 mg/mL; 0 5 mL methylPREDNISolone acetate 80 mg/mL; 1 mL methylPREDNISolone acetate 80 mg/mL    Patient tolerance: patient tolerated the procedure well with no immediate complications  Dressing:  Sterile dressing applied    Ear cerumen removal    Date/Time: 2/23/2023 9:00 AM  Performed by: Harvey Amaya MD  Authorized by: Harvey Amaya MD   Universal Protocol:  Consent: Verbal consent obtained  Consent given by: patient  Patient identity confirmed: verbally with patient      Patient location:  Clinic  Procedure details:     Local anesthetic:  None    Location:  L ear    Procedure type: curette      Approach:  Natural orifice  Post-procedure details:     Hearing quality:  Improved    Patient tolerance of procedure: Tolerated well, no immediate complications  Ear cerumen removal    Date/Time: 2/23/2023 9:01 AM  Performed by: Harvey Amaya MD  Authorized by: Harvey Amaya MD   Universal Protocol:  Consent: Verbal consent obtained    Risks and benefits: risks, benefits and alternatives were discussed  Consent given by: patient  Patient identity confirmed: verbally with patient      Patient location:  Clinic  Procedure details:     Local anesthetic:  None    Location:  R ear    Procedure type: curette    Post-procedure details:     Complication:  None    Hearing quality:  Improved    Patient tolerance of procedure:   Tolerated well, no immediate complications        Bolivar Masterson MD

## 2023-02-23 NOTE — ASSESSMENT & PLAN NOTE
Continue Percocet 10 mg 3 times daily  Opioid agreement is on file and we did urine drug testing last visit

## 2023-03-07 DIAGNOSIS — G89.4 CHRONIC PAIN SYNDROME: ICD-10-CM

## 2023-03-07 RX ORDER — OXYCODONE AND ACETAMINOPHEN 10; 325 MG/1; MG/1
1 TABLET ORAL EVERY 4 HOURS PRN
Qty: 90 TABLET | Refills: 0 | Status: SHIPPED | OUTPATIENT
Start: 2023-03-07

## 2023-03-07 NOTE — TELEPHONE ENCOUNTER
Medication:  PDMP   02/07/2023 02/07/2023 ACETAMINOPHEN 325 MG / oxyCODONE HYDROCHLORIDE 10 MG ORAL TABLET (Tablet)  90 0 15 10 0 MG/325 0 MG 90 0 [id number]  Other 0 / 0 PA      1  [id number] 01/09/2023 01/09/2023 ACETAMINOPHEN 325 MG / oxyCODONE HYDROCHLORIDE 10 MG ORAL TABLET (Tablet)  90 0 15 10 0 MG/325 0 MG 90 0 [id number]  Other 0 / 0 PA    1  [id number] 12/08/2022 12/08/2022 ACETAMINOPHEN 325 MG / oxyCODONE HYDROCHLORIDE 10 MG ORAL TABLET (Tablet)  90 0 15 10 0 MG/325 0 MG 90 0 [id number]  Other 0 / 0       Active agreement on file -Yes

## 2023-03-08 ENCOUNTER — APPOINTMENT (OUTPATIENT)
Dept: RADIOLOGY | Facility: CLINIC | Age: 63
End: 2023-03-08

## 2023-03-08 ENCOUNTER — OFFICE VISIT (OUTPATIENT)
Dept: OBGYN CLINIC | Facility: CLINIC | Age: 63
End: 2023-03-08

## 2023-03-08 VITALS
HEIGHT: 70 IN | HEART RATE: 88 BPM | OXYGEN SATURATION: 98 % | WEIGHT: 262 LBS | RESPIRATION RATE: 18 BRPM | DIASTOLIC BLOOD PRESSURE: 77 MMHG | BODY MASS INDEX: 37.51 KG/M2 | SYSTOLIC BLOOD PRESSURE: 113 MMHG

## 2023-03-08 DIAGNOSIS — Z96.641 STATUS POST RIGHT HIP REPLACEMENT: Primary | ICD-10-CM

## 2023-03-08 DIAGNOSIS — Z96.641 STATUS POST RIGHT HIP REPLACEMENT: ICD-10-CM

## 2023-03-08 RX ORDER — AMOXICILLIN 500 MG/1
2000 CAPSULE ORAL
Qty: 4 CAPSULE | Refills: 0 | Status: SHIPPED | OUTPATIENT
Start: 2023-03-08 | End: 2023-03-09

## 2023-03-08 NOTE — PROGRESS NOTES
HPI:  Patient is a 63y o  year old  male  who presents for follow up evaluation approximately 5 months status post right total hip arthroplasty preformed on 10/13/22  Patient denies any pain in right hip  Patient denies any numbness or tingling in right lower extremity  Patient has completed physical therapy and has been back to work since January 9th  Patient offers no additional complaints      ROS:   General: No fever, no chills, no weight loss, no weight gain  HEENT:  No loss of hearing, no nose bleeds, no sore throat  Eyes:  No eye pain, no red eyes, no visual disturbance  Respiratory:  No cough, no shortness of breath, no wheezing  Cardiovascular:  No chest pain, no palpitations, no edema  GI: No abdominal pain, no nausea, no vomiting  Endocrine: No frequent urination, no excessive thirst  Urinary:  No dysuria, no hematuria, no incontinence  Musculoskeletal: see HPI and PE  Skin:  No rash, no wounds  Neurological:  No dizziness, no headache, no numbness  Psychiatric:  No difficulty concentrating, no depression, no suicide thoughts, no anxiety  Review of all other systems is negative    PMH:  Past Medical History:   Diagnosis Date   • Arthritis    • Gout    • Hypertension        PSH:  Past Surgical History:   Procedure Laterality Date   • BACK SURGERY      Discs   • CATARACT EXTRACTION     • KNEE SURGERY     • WA ARTHRP ACETBLR/PROX FEM PROSTC AGRFT/ALGRFT Right 10/13/2022    Procedure: ARTHROPLASTY HIP TOTAL;  Surgeon: Maciel Buckley MD;  Location: [facility];  Service: Orthopedics   • ROTATOR CUFF REPAIR         Medications:  Current Outpatient Medications   Medication Sig Dispense Refill   • acetaminophen (TYLENOL) 325 mg tablet Take 2 tablets (650 mg total) by mouth every 6 (six) hours as needed for mild pain 90 tablet 0   • amoxicillin (AMOXIL) 500 mg capsule Take 4 capsules (2,000 mg total) by mouth 60 minutes pre-procedure for 1 day 4 capsule 0   • colchicine (COLCRYS) 0 6 mg tablet TAKE 1 TABLET TWICE A DAY AND EVERY 3 HOURS AS NEEDED FOR GOUT AS DIRECTED 200 tablet 3   • indomethacin (INDOCIN) 50 mg capsule Take 1 capsule (50 mg total) by mouth 3 (three) times a day with meals (Patient taking differently: Take 50 mg by mouth 3 (three) times a day as needed) 30 capsule 0   • levocetirizine (XYZAL) 5 MG tablet Take 5 mg by mouth every evening      • lisinopril (ZESTRIL) 20 mg tablet Take 1 tablet (20 mg total) by mouth daily 90 tablet 5   • Melatonin 1 MG CAPS Take 1 mg by mouth in the morning     • Multiple Vitamins-Minerals (multivitamin with minerals) tablet Take 1 tablet by mouth daily 30 tablet 0   • omeprazole (PriLOSEC) 40 MG capsule TAKE 1 CAPSULE DAILY 90 capsule 3   • oxyCODONE-acetaminophen (Percocet)  mg per tablet Take 1 tablet by mouth every 4 (four) hours as needed for severe pain Max Daily Amount: 6 tablets (Patient not taking: Reported on 3/8/2023) 90 tablet 0   • rOPINIRole (REQUIP) 1 mg tablet TAKE 1 TABLET DAILY AT BEDTIME 90 tablet 3     Current Facility-Administered Medications   Medication Dose Route Frequency Provider Last Rate Last Admin   • cyanocobalamin injection 1,000 mcg  1,000 mcg Intramuscular Q30 Days Yaakov Forrester MD   1,000 mcg at 05/11/18 1643       Allergies:  No Known Allergies    Family History:  Family History   Problem Relation Age of Onset   • Esophageal cancer Father    • Lung cancer Father    • Pancreatic cancer Father    • Thyroid nodules Father    • Stroke Family        Social History:  Social History     Occupational History     Comment: employed   Tobacco Use   • Smoking status: Former     Types: Cigars   • Smokeless tobacco: Never   Vaping Use   • Vaping Use: Never used   Substance and Sexual Activity   • Alcohol use:  Yes     Alcohol/week: 1 0 standard drink     Types: 1 Cans of beer per week     Comment: occasional   • Drug use: No   • Sexual activity: Yes     Partners: Female       Physical Exam:  General :  Alert, cooperative, no distress, appears stated age  Blood pressure 113/77, pulse 88, resp  rate 18, height 5' 10" (1 778 m), weight 119 kg (262 lb), SpO2 98 %  Head:  Normocephalic, without obvious abnormality, atraumatic   Eyes:  Conjunctiva/corneas clear, EOM's intact,   Ears: Both ears normal appearance, no hearing deficits  Nose: Nares normal, septum midline, no drainage    Neck: Supple,  trachea midline, no adenopathy, no tenderness, no mass   Back:   Symmetric, no curvature, ROM normal, no tenderness   Lungs:   Respirations unlabored   Chest Wall:  No tenderness or deformity   Extremities: Extremities normal, atraumatic, no cyanosis or edema      Pulses: 2+ and symmetric   Skin: Skin color, texture, turgor normal, no rashes or lesions      Neurologic: Normal           Right Hip Exam     Tenderness   The patient is experiencing no tenderness  Range of Motion   The patient has normal right hip ROM  Muscle Strength   Abduction: 5/5   Adduction: 5/5   Flexion: 5/5     Other   Erythema: absent  Scars: present  Sensation: normal    Comments:  Incision is well healed without erythema, ecchymosis, drainage, or signs of acute infection  Imaging Studies: The following imaging studies were reviewed in office today  My findings are noted  Xrays of right hip obtained today shows hardware in stable postioning without signs of loosening or failure  No acute findings of fracture or dislocation  Assessment  Encounter Diagnosis   Name Primary? • Status post right hip replacement Yes         Plan:  Patient is a 82-year-old male approx  5 months s/p right total hip arthroplasty, DOS: 10/13/22  *Xrays of right hip obtained today  *Patient is doing well status post surgical intervention  *Advised patient on dental antibiotic prophylaxis  Patient has upcoming dental appointment this month  Prescribed amoxicillin 2000mg for patient to take one hour prior to appointment  *Patient will follow-up one year postop for re-evaluation  Scribe Attestation    I,:  Armani Cosme PA-C am acting as a scribe while in the presence of the attending physician :       I,:  Amber Libman, MD personally performed the services described in this documentation    as scribed in my presence :

## 2023-04-04 DIAGNOSIS — G89.4 CHRONIC PAIN SYNDROME: ICD-10-CM

## 2023-04-04 RX ORDER — OXYCODONE AND ACETAMINOPHEN 10; 325 MG/1; MG/1
1 TABLET ORAL EVERY 4 HOURS PRN
Qty: 90 TABLET | Refills: 0 | Status: SHIPPED | OUTPATIENT
Start: 2023-04-04

## 2023-04-04 NOTE — TELEPHONE ENCOUNTER
Medication:  PDMP   1  [id number] 03/07/2023 03/07/2023 ACETAMINOPHEN 325 MG / oxyCODONE HYDROCHLORIDE 10 MG ORAL TABLET (Tablet)  10 0 2 10 0 MG/325 0 MG 75 0 [id number]  Other 0 / 0 PA    1  [id number] 03/07/2023 03/07/2023 ACETAMINOPHEN 325 MG / oxyCODONE HYDROCHLORIDE 10 MG ORAL TABLET (Tablet)  80 0 13 10 0 MG/325 0 MG 92 31 [facility]  Private Pay 0 / 0 PA    1  [id number] 02/07/2023 02/07/2023 ACETAMINOPHEN 325 MG / oxyCODONE HYDROCHLORIDE 10 MG ORAL TABLET (Tablet)  90 0 15 10 0 MG/325 0 MG 90 0 [facility]  Other            Active agreement on file -Yes

## 2023-05-03 DIAGNOSIS — G89.4 CHRONIC PAIN SYNDROME: ICD-10-CM

## 2023-05-03 RX ORDER — OXYCODONE AND ACETAMINOPHEN 10; 325 MG/1; MG/1
1 TABLET ORAL EVERY 4 HOURS PRN
Qty: 90 TABLET | Refills: 0 | Status: SHIPPED | OUTPATIENT
Start: 2023-05-03

## 2023-05-03 NOTE — TELEPHONE ENCOUNTER
Medication:  PDMP  1  [id number] 04/04/2023 04/04/2023 ACETAMINOPHEN 325 MG / oxyCODONE HYDROCHLORIDE 10 MG ORAL TABLET [ENDOCET] (Tablet)  90 0 15 10 0 MG/325 0 MG 90 0 [id number]  Other 0 / 0 PA    1  [id number] 03/07/2023 03/07/2023 ACETAMINOPHEN 325 MG / oxyCODONE HYDROCHLORIDE 10 MG ORAL TABLET (Tablet)  10 0 2 10 0 MG/325 0 MG 75 0 [facility]  Other 0 / 0 PA    1  [id number] 03/07/2023 03/07/2023 ACETAMINOPHEN 325 MG / oxyCODONE HYDROCHLORIDE 10 MG ORAL TABLET (Tablet)  80 0 13 10 0 MG/325 0 MG 92 31 [facility]          Active agreement on file -Yes

## 2023-05-23 NOTE — TELEPHONE ENCOUNTER
Medication:  PDMP   12/16/2021  1  12/14/2021  OXYCODONE-ACETAMINOPHEN 5-325  50 0  8  BR DARIEL  [id number]  BARBARA ([id number])  0  46 88 MME  Other  PA    11/18/2021  1  11/18/2021  OXYCODONE-ACETAMINOPHEN 5-325  50 0  8  BR DARIEL  [id number]  BARBARA ([id number])  0  46 88 MME  Other  PA    10/19/2021  1  10/18/2021  OXYCODONE-ACETAMINOPHEN 5-325  50 0  8  BR DARIEL  [id number]  BARBARA ([id number])  0  46 88 MME  Other  PA    09/17/2021  1  09/17/2021  OXYCODONE-ACETAMINOPHEN 5-325  50 0  8  BR DARIEL  [id number]  BARBARA ([id number])  0  46 88 MME  Other  PA    08/18/2021  1  08/18/2021  OXYCODONE-ACETAMINOPHEN 5-325  50 0  8  BR DARIEL  [id number]  BARBARA ([id number])  0  46 88 MME  Other  PA      Active agreement on file -no    Dr Cardoso He, are you able to refill this in the absence of Dr Ana Heard if appropriate? [place] Counseling: I discussed with the patient the risks of tildrakizumab including but not limited to immunosuppression, malignancy, posterior leukoencephalopathy syndrome, and serious infections.  The patient understands that monitoring is required including a PPD at baseline and must alert us or the primary physician if symptoms of infection or other concerning signs are noted.

## 2023-06-04 DIAGNOSIS — G89.4 CHRONIC PAIN SYNDROME: ICD-10-CM

## 2023-06-04 DIAGNOSIS — J30.9 ALLERGIC RHINITIS, UNSPECIFIED SEASONALITY, UNSPECIFIED TRIGGER: Primary | ICD-10-CM

## 2023-06-05 RX ORDER — LEVOCETIRIZINE DIHYDROCHLORIDE 5 MG/1
5 TABLET, FILM COATED ORAL EVERY EVENING
Qty: 90 TABLET | Refills: 5 | Status: SHIPPED | OUTPATIENT
Start: 2023-06-05

## 2023-06-05 RX ORDER — OXYCODONE AND ACETAMINOPHEN 10; 325 MG/1; MG/1
1 TABLET ORAL EVERY 4 HOURS PRN
Qty: 90 TABLET | Refills: 0 | Status: SHIPPED | OUTPATIENT
Start: 2023-06-05

## 2023-06-12 ENCOUNTER — RA CDI HCC (OUTPATIENT)
Dept: OTHER | Facility: HOSPITAL | Age: 63
End: 2023-06-12

## 2023-06-12 NOTE — PROGRESS NOTES
[facility]  coding opportunities       Chart reviewed, no opportunity found: CHART REVIEWED, NO OPPORTUNITY FOUND        Patients Insurance        Commercial Insurance: [place]

## 2023-06-26 ENCOUNTER — OFFICE VISIT (OUTPATIENT)
Dept: FAMILY MEDICINE CLINIC | Facility: CLINIC | Age: 63
End: 2023-06-26
Payer: COMMERCIAL

## 2023-06-26 VITALS
HEIGHT: 70 IN | TEMPERATURE: 98.7 F | DIASTOLIC BLOOD PRESSURE: 84 MMHG | SYSTOLIC BLOOD PRESSURE: 120 MMHG | HEART RATE: 89 BPM | WEIGHT: 256 LBS | BODY MASS INDEX: 36.65 KG/M2 | OXYGEN SATURATION: 91 %

## 2023-06-26 DIAGNOSIS — M77.8 TENDONITIS OF SHOULDER, RIGHT: ICD-10-CM

## 2023-06-26 DIAGNOSIS — G89.4 CHRONIC PAIN SYNDROME: ICD-10-CM

## 2023-06-26 DIAGNOSIS — E78.5 DYSLIPIDEMIA: ICD-10-CM

## 2023-06-26 DIAGNOSIS — G25.81 RLS (RESTLESS LEGS SYNDROME): ICD-10-CM

## 2023-06-26 DIAGNOSIS — K21.9 GASTROESOPHAGEAL REFLUX DISEASE, UNSPECIFIED WHETHER ESOPHAGITIS PRESENT: ICD-10-CM

## 2023-06-26 DIAGNOSIS — Z00.00 HEALTH MAINTENANCE EXAMINATION: Primary | ICD-10-CM

## 2023-06-26 DIAGNOSIS — I10 PRIMARY HYPERTENSION: ICD-10-CM

## 2023-06-26 DIAGNOSIS — M51.36 DDD (DEGENERATIVE DISC DISEASE), LUMBAR: ICD-10-CM

## 2023-06-26 DIAGNOSIS — Z01.818 PREOPERATIVE EXAMINATION: ICD-10-CM

## 2023-06-26 DIAGNOSIS — R73.03 PREDIABETES: ICD-10-CM

## 2023-06-26 DIAGNOSIS — M19.90 ARTHRITIS: ICD-10-CM

## 2023-06-26 DIAGNOSIS — F11.20 CONTINUOUS OPIOID DEPENDENCE (HCC): ICD-10-CM

## 2023-06-26 DIAGNOSIS — J30.9 ALLERGIC RHINITIS, UNSPECIFIED SEASONALITY, UNSPECIFIED TRIGGER: ICD-10-CM

## 2023-06-26 DIAGNOSIS — Z12.5 PROSTATE CANCER SCREENING: ICD-10-CM

## 2023-06-26 PROBLEM — M16.11 PRIMARY OSTEOARTHRITIS OF RIGHT HIP: Status: RESOLVED | Noted: 2022-10-06 | Resolved: 2023-06-26

## 2023-06-26 PROBLEM — M25.551 HIP PAIN, RIGHT: Status: RESOLVED | Noted: 2022-03-22 | Resolved: 2023-06-26

## 2023-06-26 PROCEDURE — 20610 DRAIN/INJ JOINT/BURSA W/O US: CPT | Performed by: FAMILY MEDICINE

## 2023-06-26 PROCEDURE — 99396 PREV VISIT EST AGE 40-64: CPT | Performed by: FAMILY MEDICINE

## 2023-06-26 RX ORDER — METHYLPREDNISOLONE ACETATE 80 MG/ML
1 INJECTION, SUSPENSION INTRA-ARTICULAR; INTRALESIONAL; INTRAMUSCULAR; SOFT TISSUE
Status: COMPLETED | OUTPATIENT
Start: 2023-06-26 | End: 2023-06-26

## 2023-06-26 RX ADMIN — METHYLPREDNISOLONE ACETATE 1 ML: 80 INJECTION, SUSPENSION INTRA-ARTICULAR; INTRALESIONAL; INTRAMUSCULAR; SOFT TISSUE at 15:40

## 2023-06-26 NOTE — PROGRESS NOTES
Name: Neli Viveros      : 1960      MRN: [id number]  Encounter Provider: Omaira Flores MD  Encounter Date: 2023   Encounter department: [facility] [address] [address]   Return visit in 4 months  1  Health maintenance examination    2  Primary hypertension  Assessment & Plan:  Continue lisinopril 20 mg daily      3  Gastroesophageal reflux disease, unspecified whether esophagitis present  Assessment & Plan:  Continue Prilosec 20 mg daily      4  Allergic rhinitis, unspecified seasonality, unspecified trigger    5  DDD (degenerative disc disease), lumbar    6  Dyslipidemia    7  RLS (restless legs syndrome)  Assessment & Plan:  Continue Requip 1 mg nightly    Orders:  -     Comprehensive metabolic panel; Future  -     Lipid panel; Future    8  Preoperative examination  -     Hemoglobin A1C; Future    9  Chronic pain syndrome    10  Continuous opioid dependence ([facility] [address] )    11  Tendonitis of shoulder, right    12  Arthritis  -     Sedimentation rate, automated; Future  -     Uric acid; Future  -     Lyme Disease Serology w/Reflex; Future  -     ALEX Screen w/ Reflex to Titer/Pattern; Future  -     RF Screen w/ Reflex to Titer; Future  -     CBC and differential; Future    13  Prostate cancer screening  -     PSA, Total Screen; Future    14  Prediabetes  -     Hemoglobin A1C; Future         Subjective      Patient comes in for checkup  He takes Percocet 10 mg 3 times a day for chronic back pain and other joint aches and pains  He complains worsening of his chronic right shoulder pain and wishes to have injection there  He has opioid agreement on file and we have done urine drug testing within the last year  He feels he needs pain medication to help him keep working  He feels that his joint pain is getting worse and is wonder if he has Lyme's or rheumatoid arthritis  Review of Systems   Constitutional: Negative  Respiratory: Negative  "  Cardiovascular: Negative  Musculoskeletal: Positive for arthralgias and back pain  Current Outpatient Medications on File Prior to Visit   Medication Sig   • acetaminophen (TYLENOL) 325 mg tablet Take 2 tablets (650 mg total) by mouth every 6 (six) hours as needed for mild pain   • colchicine (COLCRYS) 0 6 mg tablet TAKE 1 TABLET TWICE A DAY AND EVERY 3 HOURS AS NEEDED FOR GOUT AS DIRECTED   • indomethacin (INDOCIN) 50 mg capsule Take 1 capsule (50 mg total) by mouth 3 (three) times a day with meals (Patient taking differently: Take 50 mg by mouth 3 (three) times a day as needed)   • levocetirizine (XYZAL) 5 MG tablet Take 1 tablet (5 mg total) by mouth every evening   • lisinopril (ZESTRIL) 20 mg tablet Take 1 tablet (20 mg total) by mouth daily   • Melatonin 1 MG CAPS Take 1 mg by mouth in the morning   • Multiple Vitamins-Minerals (multivitamin with minerals) tablet Take 1 tablet by mouth daily   • omeprazole (PriLOSEC) 40 MG capsule TAKE 1 CAPSULE DAILY   • oxyCODONE-acetaminophen (Percocet)  mg per tablet Take 1 tablet by mouth every 4 (four) hours as needed for severe pain Max Daily Amount: 6 tablets   • rOPINIRole (REQUIP) 1 mg tablet TAKE 1 TABLET DAILY AT BEDTIME       Objective     /84 (BP Location: Left arm, Patient Position: Sitting, Cuff Size: Large)   Pulse 89   Temp 98 7 °F (37 1 °C) (Tympanic)   Ht 5' 10\" (1 778 m)   Wt 116 kg (256 lb)   SpO2 91%   BMI 36 73 kg/m²     Physical Exam  Constitutional:       Appearance: He is well-developed  He is obese  HENT:      Head: Normocephalic and atraumatic  Eyes:      Pupils: Pupils are equal, round, and reactive to light  Cardiovascular:      Rate and Rhythm: Normal rate and regular rhythm  Heart sounds: Normal heart sounds  Pulmonary:      Effort: Pulmonary effort is normal       Breath sounds: Normal breath sounds  Musculoskeletal:      Cervical back: Neck supple  Comments: Painful abduction of right shoulder   " Skin:     General: Skin is warm and dry  Neurological:      Mental Status: He is alert and oriented to person, place, and time  Psychiatric:         Mood and Affect: Mood normal          Behavior: Behavior normal          Thought Content: Thought content normal      Large joint arthrocentesis: R glenohumeral  [place] Protocol:  Consent: Verbal consent obtained    Risks and benefits: risks, benefits and alternatives were discussed  Supporting Documentation  Indications: pain   Procedure Details  Location: shoulder - R glenohumeral  Preparation: Patient was prepped and draped in the usual sterile fashion  Needle size: 25 G  Ultrasound guidance: no  Approach: posterior  Medications administered: 1 mL methylPREDNISolone acetate 80 mg/mL    Patient tolerance: patient tolerated the procedure well with no immediate complications  Dressing:  Sterile dressing applied          Bertha Spencer MD

## 2023-07-06 DIAGNOSIS — G89.4 CHRONIC PAIN SYNDROME: ICD-10-CM

## 2023-07-07 NOTE — TELEPHONE ENCOUNTER
Medication:  PDMP   1 [id number] 06/05/2023 06/05/2023 ACETAMINOPHEN 325 MG / oxyCODONE HYDROCHLORIDE 10 MG ORAL TABLET (Tablet) 90.0 15 10.0 MG/325.0 MG 90.0 [facility] Other 0 / 0 PA    1 [id number] 05/03/2023 05/03/2023 ACETAMINOPHEN 325 MG / oxyCODONE HYDROCHLORIDE 10 MG ORAL TABLET (Tablet) 90.0 15 10.0 MG/325.0 MG 90.0 [facility] Other 0 / 0 PA    1 [id number] 04/04/2023 04/04/2023 ACETAMINOPHEN 325 MG / oxyCODONE HYDROCHLORIDE 10 MG ORAL TABLET [ENDOCET] (Tablet) 90.0 15 10.0 MG/325.0 MG 90.0 [facility] Other 0 / 0 PA          Active agreement on file -Yes

## 2023-07-09 RX ORDER — OXYCODONE AND ACETAMINOPHEN 10; 325 MG/1; MG/1
1 TABLET ORAL EVERY 4 HOURS PRN
Qty: 90 TABLET | Refills: 0 | Status: SHIPPED | OUTPATIENT
Start: 2023-07-09

## 2023-07-10 DIAGNOSIS — K21.9 GASTROESOPHAGEAL REFLUX DISEASE: ICD-10-CM

## 2023-07-10 RX ORDER — OMEPRAZOLE 40 MG/1
CAPSULE, DELAYED RELEASE ORAL
Qty: 90 CAPSULE | Refills: 3 | Status: SHIPPED | OUTPATIENT
Start: 2023-07-10

## 2023-07-13 DIAGNOSIS — J30.9 ALLERGIC RHINITIS, UNSPECIFIED SEASONALITY, UNSPECIFIED TRIGGER: ICD-10-CM

## 2023-07-13 RX ORDER — LEVOCETIRIZINE DIHYDROCHLORIDE 5 MG/1
5 TABLET, FILM COATED ORAL EVERY EVENING
Qty: 90 TABLET | Refills: 0 | Status: SHIPPED | OUTPATIENT
Start: 2023-07-13

## 2023-07-27 DIAGNOSIS — G89.4 CHRONIC PAIN SYNDROME: ICD-10-CM

## 2023-07-27 RX ORDER — OXYCODONE AND ACETAMINOPHEN 10; 325 MG/1; MG/1
1 TABLET ORAL EVERY 4 HOURS PRN
Qty: 90 TABLET | Refills: 0 | Status: SHIPPED | OUTPATIENT
Start: 2023-07-27

## 2023-07-27 NOTE — TELEPHONE ENCOUNTER
Medication:  PDMP     1 [id number] 07/10/2023 07/09/2023 ACETAMINOPHEN 325 MG / oxyCODONE HYDROCHLORIDE 10 MG ORAL TABLET (Tablet) 90.0 15 10.0 MG/325.0 MG 90.0 [facility] Other 0 / 0 PA    1 [id number] 06/05/2023 06/05/2023 ACETAMINOPHEN 325 MG / oxyCODONE HYDROCHLORIDE 10 MG ORAL TABLET (Tablet) 90.0 15 10.0 MG/325.0 MG 90.0 [facility] Other 0 / 0 PA    1 [id number] 05/03/2023 05/03/2023 ACETAMINOPHEN 325 MG / oxyCODONE HYDROCHLORIDE 10 MG ORAL TABLET (Tablet) 90.0 15 10.0 MG/325.0 MG 90.0 [facility] Other 0 / 0 PA        Active agreement on file -Yes

## 2023-08-24 DIAGNOSIS — M25.50 ARTHRALGIA, UNSPECIFIED JOINT: ICD-10-CM

## 2023-08-24 DIAGNOSIS — G89.4 CHRONIC PAIN SYNDROME: ICD-10-CM

## 2023-08-25 RX ORDER — INDOMETHACIN 50 MG/1
50 CAPSULE ORAL
Qty: 30 CAPSULE | Refills: 0 | Status: SHIPPED | OUTPATIENT
Start: 2023-08-25

## 2023-08-25 NOTE — TELEPHONE ENCOUNTER
Medication:  PDMP     1 [id number] 07/27/2023 07/27/2023 ACETAMINOPHEN 325 MG / oxyCODONE HYDROCHLORIDE 10 MG ORAL TABLET (Tablet) 90.0 15 10.0 MG/325.0 MG 90.0 [facility] Other 0 / 0 PA    1 [id number] 07/10/2023 07/09/2023 ACETAMINOPHEN 325 MG / oxyCODONE HYDROCHLORIDE 10 MG ORAL TABLET (Tablet) 90.0 15 10.0 MG/325.0 MG 90.0 [facility] Other 0 / 0 PA    1 [id number] 06/05/2023 06/05/2023 ACETAMINOPHEN 325 MG / oxyCODONE HYDROCHLORIDE 10 MG ORAL TABLET (Tablet) 90.0 15 10.0 MG/325.0 MG 90.0 [facility] Other 0 / 0 PA    1 [id number] 05/03/2023 05/03/2023 ACETAMINOPHEN 325 MG / oxyCODONE HYDROCHLORIDE 10 MG ORAL TABLET (Tablet) 90.0 15 10.0 MG/325.0 MG 90.0 [facility] Other 0 / 0 PA          Active agreement on file -Yes

## 2023-08-29 RX ORDER — OXYCODONE AND ACETAMINOPHEN 10; 325 MG/1; MG/1
1 TABLET ORAL EVERY 4 HOURS PRN
Qty: 90 TABLET | Refills: 0 | Status: SHIPPED | OUTPATIENT
Start: 2023-08-29

## 2023-08-29 NOTE — TELEPHONE ENCOUNTER
Medication: Please review on Dr Roly Wasserman   PDMP     [id number] 07/27/2023 07/27/2023 ACETAMINOPHEN 325 MG / HYDROcodone BITARTRATE 7.5 MG ORAL TABLET (Tablet) 90.0 30 7.5 MG/325.0 MG 22.50 [facility] Other 0 / 0 PA     1 [id number] 06/28/2023 06/27/2023 ACETAMINOPHEN 325 MG / HYDROcodone BITARTRATE 7.5 MG ORAL TABLET (Tablet) 90.0 30 7.5 MG/325.0 MG 22.50 [facility] Other 0 / 0 PA    1 [id number] 05/31/2023 05/30/2023 ACETAMINOPHEN 325 MG / HYDROcodone BITARTRATE 7.5 MG ORAL TABLET (Tablet) 90.0 30 7.5 MG/325.0 MG 22.50 [facility] Other 0 / 0 PA        Active agreement on file -Yes

## 2023-09-19 ENCOUNTER — TELEPHONE (OUTPATIENT)
Dept: FAMILY MEDICINE CLINIC | Facility: CLINIC | Age: 63
End: 2023-09-19

## 2023-09-25 DIAGNOSIS — M10.9 GOUT, UNSPECIFIED CAUSE, UNSPECIFIED CHRONICITY, UNSPECIFIED SITE: ICD-10-CM

## 2023-09-25 RX ORDER — COLCHICINE 0.6 MG/1
TABLET ORAL
Qty: 200 TABLET | Refills: 3 | Status: SHIPPED | OUTPATIENT
Start: 2023-09-25

## 2023-09-26 DIAGNOSIS — G89.4 CHRONIC PAIN SYNDROME: ICD-10-CM

## 2023-09-26 RX ORDER — OXYCODONE AND ACETAMINOPHEN 10; 325 MG/1; MG/1
1 TABLET ORAL EVERY 4 HOURS PRN
Qty: 90 TABLET | Refills: 0 | Status: SHIPPED | OUTPATIENT
Start: 2023-09-26

## 2023-09-26 NOTE — TELEPHONE ENCOUNTER
Medication:  PDMP     1 [id number] 08/29/2023 08/29/2023 ACETAMINOPHEN 325 MG / HYDROcodone BITARTRATE 7.5 MG ORAL TABLET (Tablet) 90.0 30 7.5 MG/325.0 MG 22.50 [facility] Other 0 / 0 PA    1 [id number] 07/27/2023 07/27/2023 ACETAMINOPHEN 325 MG / HYDROcodone BITARTRATE 7.5 MG ORAL TABLET (Tablet) 90.0 30 7.5 MG/325.0 MG 22.50 [facility] Other 0 / 0 PA    1 [id number] 06/28/2023 06/27/2023 ACETAMINOPHEN 325 MG / HYDROcodone BITARTRATE 7.5 MG ORAL TABLET (Tablet) 90.0 30 7.5 MG/325.0 MG 22.50 [facility] Other 0 / 0 PA          Active agreement on file -Yes

## 2023-09-27 ENCOUNTER — APPOINTMENT (OUTPATIENT)
Dept: LAB | Facility: HOSPITAL | Age: 63
End: 2023-09-27
Payer: COMMERCIAL

## 2023-09-27 DIAGNOSIS — Z12.5 PROSTATE CANCER SCREENING: ICD-10-CM

## 2023-09-27 DIAGNOSIS — R73.03 PREDIABETES: ICD-10-CM

## 2023-09-27 DIAGNOSIS — G25.81 RLS (RESTLESS LEGS SYNDROME): ICD-10-CM

## 2023-09-27 DIAGNOSIS — Z01.818 PREOPERATIVE EXAMINATION: ICD-10-CM

## 2023-09-27 DIAGNOSIS — M19.90 ARTHRITIS: ICD-10-CM

## 2023-09-27 LAB
ALBUMIN SERPL BCP-MCNC: 4.4 G/DL (ref 3.5–5)
ALP SERPL-CCNC: 59 U/L (ref 34–104)
ALT SERPL W P-5'-P-CCNC: 15 U/L (ref 7–52)
ANA SER QL IA: NEGATIVE
ANION GAP SERPL CALCULATED.3IONS-SCNC: 6 MMOL/L
AST SERPL W P-5'-P-CCNC: 19 U/L (ref 13–39)
B BURGDOR IGG+IGM SER QL IA: NEGATIVE
BASOPHILS # BLD AUTO: 0.08 THOUSANDS/ÂΜL (ref 0–0.1)
BASOPHILS NFR BLD AUTO: 1 % (ref 0–1)
BILIRUB SERPL-MCNC: 0.88 MG/DL (ref 0.2–1)
BUN SERPL-MCNC: 15 MG/DL (ref 5–25)
CALCIUM SERPL-MCNC: 9.6 MG/DL (ref 8.4–10.2)
CHLORIDE SERPL-SCNC: 105 MMOL/L (ref 96–108)
CHOLEST SERPL-MCNC: 184 MG/DL
CO2 SERPL-SCNC: 27 MMOL/L (ref 21–32)
CREAT SERPL-MCNC: 1.13 MG/DL (ref 0.6–1.3)
CRYOGLOB RF SER-ACNC: ABNORMAL [IU]/ML
EOSINOPHIL # BLD AUTO: 0.49 THOUSAND/ÂΜL (ref 0–0.61)
EOSINOPHIL NFR BLD AUTO: 6 % (ref 0–6)
ERYTHROCYTE [DISTWIDTH] IN BLOOD BY AUTOMATED COUNT: 13.7 % (ref 11.6–15.1)
ERYTHROCYTE [SEDIMENTATION RATE] IN BLOOD: 23 MM/HOUR (ref 0–19)
EST. AVERAGE GLUCOSE BLD GHB EST-MCNC: 128 MG/DL
GFR SERPL CREATININE-BSD FRML MDRD: 69 ML/MIN/1.73SQ M
GLUCOSE P FAST SERPL-MCNC: 98 MG/DL (ref 65–99)
HBA1C MFR BLD: 6.1 %
HCT VFR BLD AUTO: 44.2 % (ref 36.5–49.3)
HDLC SERPL-MCNC: 42 MG/DL
HGB BLD-MCNC: 15 G/DL (ref 12–17)
IMM GRANULOCYTES # BLD AUTO: 0.02 THOUSAND/UL (ref 0–0.2)
IMM GRANULOCYTES NFR BLD AUTO: 0 % (ref 0–2)
LDLC SERPL CALC-MCNC: 121 MG/DL (ref 0–100)
LYMPHOCYTES # BLD AUTO: 1.81 THOUSANDS/ÂΜL (ref 0.6–4.47)
LYMPHOCYTES NFR BLD AUTO: 21 % (ref 14–44)
MCH RBC QN AUTO: 30 PG (ref 26.8–34.3)
MCHC RBC AUTO-ENTMCNC: 33.9 G/DL (ref 31.4–37.4)
MCV RBC AUTO: 88 FL (ref 82–98)
MONOCYTES # BLD AUTO: 0.81 THOUSAND/ÂΜL (ref 0.17–1.22)
MONOCYTES NFR BLD AUTO: 10 % (ref 4–12)
NEUTROPHILS # BLD AUTO: 5.23 THOUSANDS/ÂΜL (ref 1.85–7.62)
NEUTS SEG NFR BLD AUTO: 62 % (ref 43–75)
NONHDLC SERPL-MCNC: 142 MG/DL
NRBC BLD AUTO-RTO: 0 /100 WBCS
PLATELET # BLD AUTO: 325 THOUSANDS/UL (ref 149–390)
PMV BLD AUTO: 9.4 FL (ref 8.9–12.7)
POTASSIUM SERPL-SCNC: 4.3 MMOL/L (ref 3.5–5.3)
PROT SERPL-MCNC: 7.6 G/DL (ref 6.4–8.4)
PSA SERPL-MCNC: 0.21 NG/ML (ref 0–4)
RBC # BLD AUTO: 5 MILLION/UL (ref 3.88–5.62)
RHEUMATOID FACT SER QL LA: POSITIVE
SODIUM SERPL-SCNC: 138 MMOL/L (ref 135–147)
TRIGL SERPL-MCNC: 107 MG/DL
URATE SERPL-MCNC: 9 MG/DL (ref 3.5–8.5)
WBC # BLD AUTO: 8.44 THOUSAND/UL (ref 4.31–10.16)

## 2023-09-27 PROCEDURE — 86431 RHEUMATOID FACTOR QUANT: CPT

## 2023-09-27 PROCEDURE — 36415 COLL VENOUS BLD VENIPUNCTURE: CPT

## 2023-09-27 PROCEDURE — 80053 COMPREHEN METABOLIC PANEL: CPT

## 2023-09-27 PROCEDURE — 86618 LYME DISEASE ANTIBODY: CPT

## 2023-09-27 PROCEDURE — 85652 RBC SED RATE AUTOMATED: CPT

## 2023-09-27 PROCEDURE — 83036 HEMOGLOBIN GLYCOSYLATED A1C: CPT

## 2023-09-27 PROCEDURE — 85025 COMPLETE CBC W/AUTO DIFF WBC: CPT

## 2023-09-27 PROCEDURE — 86430 RHEUMATOID FACTOR TEST QUAL: CPT

## 2023-09-27 PROCEDURE — G0103 PSA SCREENING: HCPCS

## 2023-09-27 PROCEDURE — 80061 LIPID PANEL: CPT

## 2023-09-27 PROCEDURE — 84550 ASSAY OF BLOOD/URIC ACID: CPT

## 2023-09-27 PROCEDURE — 86038 ANTINUCLEAR ANTIBODIES: CPT

## 2023-09-28 ENCOUNTER — TELEPHONE (OUTPATIENT)
Dept: FAMILY MEDICINE CLINIC | Facility: CLINIC | Age: 63
End: 2023-09-28

## 2023-09-28 NOTE — TELEPHONE ENCOUNTER
Pt's spouse stopped into office with Physician screening form to be completed by 9/30 for employer. Pt's bloodwork was completed and had a physical in June.  Dr David Gonzales is out of the office tomorrow and spouse is asking if another provider could fill it out for him     Please advise     (form in pending bin on side 1)

## 2023-09-29 ENCOUNTER — OFFICE VISIT (OUTPATIENT)
Dept: FAMILY MEDICINE CLINIC | Facility: CLINIC | Age: 63
End: 2023-09-29
Payer: COMMERCIAL

## 2023-09-29 VITALS
DIASTOLIC BLOOD PRESSURE: 86 MMHG | BODY MASS INDEX: 36.79 KG/M2 | WEIGHT: 257 LBS | HEIGHT: 70 IN | TEMPERATURE: 98.1 F | SYSTOLIC BLOOD PRESSURE: 132 MMHG | HEART RATE: 74 BPM | OXYGEN SATURATION: 94 %

## 2023-09-29 DIAGNOSIS — Z23 ENCOUNTER FOR IMMUNIZATION: ICD-10-CM

## 2023-09-29 DIAGNOSIS — M10.9 ACUTE GOUT OF LEFT KNEE, UNSPECIFIED CAUSE: Primary | ICD-10-CM

## 2023-09-29 PROCEDURE — 90686 IIV4 VACC NO PRSV 0.5 ML IM: CPT

## 2023-09-29 PROCEDURE — 99213 OFFICE O/P EST LOW 20 MIN: CPT | Performed by: FAMILY MEDICINE

## 2023-09-29 PROCEDURE — 90471 IMMUNIZATION ADMIN: CPT

## 2023-09-29 RX ORDER — NAPROXEN 500 MG/1
TABLET ORAL
COMMUNITY

## 2023-09-29 RX ORDER — PREDNISONE 10 MG/1
TABLET ORAL
Qty: 30 TABLET | Refills: 0 | Status: SHIPPED | OUTPATIENT
Start: 2023-09-29 | End: 2023-10-11

## 2023-09-29 NOTE — PROGRESS NOTES
Name: Nury Courser      : 1960      MRN: [id number]  Encounter Provider: Najma Del Real DO  Encounter Date: 2023   Encounter department: [address] [place] [address] [address] [facility]     1. Acute gout of left knee, unspecified cause  -     predniSONE 10 mg tablet; Take 4 tablets (40 mg total) by mouth daily for 3 days, THEN 3 tablets (30 mg total) daily for 3 days, THEN 2 tablets (20 mg total) daily for 3 days, THEN 1 tablet (10 mg total) daily for 3 days. 2. Encounter for immunization  -     influenza vaccine, quadrivalent, 0.5 mL, preservative-free       Subjective      HPI     Notes that he started with pain in left knee, gout flare. Left knee is swollen, has improved a little today compared to yesterday. States that he thinks that this has been triggered by stress. States that this has been a trigger for about 20 years. Taking colchicine, indomethacin without improvement. Denies any diet changes. Has been taking Percocet as prescribed, states that this does not help.      Review of Systems    Current Outpatient Medications on File Prior to Visit   Medication Sig   • colchicine (COLCRYS) 0.6 mg tablet TAKE 1 TABLET TWICE A DAY AND EVERY 3 HOURS AS NEEDED FOR GOUT AS DIRECTED   • indomethacin (INDOCIN) 50 mg capsule Take 1 capsule (50 mg total) by mouth 3 (three) times a day with meals   • levocetirizine (XYZAL) 5 MG tablet Take 1 tablet (5 mg total) by mouth every evening   • lisinopril (ZESTRIL) 20 mg tablet Take 1 tablet (20 mg total) by mouth daily   • Melatonin 1 MG CAPS Take 1 mg by mouth in the morning   • Multiple Vitamins-Minerals (multivitamin with minerals) tablet Take 1 tablet by mouth daily   • naproxen (NAPROSYN) 500 mg tablet    • omeprazole (PriLOSEC) 40 MG capsule TAKE 1 CAPSULE DAILY   • oxyCODONE-acetaminophen (Percocet)  mg per tablet Take 1 tablet by mouth every 4 (four) hours as needed for severe pain Max Daily Amount: 6 tablets   • rOPINIRole (REQUIP) 1 mg tablet TAKE 1 TABLET DAILY AT BEDTIME   • [DISCONTINUED] acetaminophen (TYLENOL) 325 mg tablet Take 2 tablets (650 mg total) by mouth every 6 (six) hours as needed for mild pain     Objective     /86   Pulse 74   Temp 98.1 °F (36.7 °C)   Ht 5' 10" (1.778 m)   Wt 117 kg (257 lb)   SpO2 94%   BMI 36.88 kg/m²     Physical Exam  Vitals reviewed. Constitutional:       General: He is not in acute distress. Appearance: Normal appearance. He is not ill-appearing. HENT:      Head: Normocephalic and atraumatic. Right Ear: External ear normal.      Left Ear: External ear normal.      Nose: Nose normal.      Mouth/Throat:      Mouth: Mucous membranes are moist.   Eyes:      Extraocular Movements: Extraocular movements intact. Conjunctiva/sclera: Conjunctivae normal.   Pulmonary:      Effort: Pulmonary effort is normal. No respiratory distress. Breath sounds: No wheezing. Musculoskeletal:         General: Swelling (left knee) present. Right lower leg: No edema. Left lower leg: No edema. Neurological:      Mental Status: He is alert. Mental status is at baseline.         Kecia Saavedra DO

## 2023-10-24 DIAGNOSIS — G89.4 CHRONIC PAIN SYNDROME: ICD-10-CM

## 2023-10-24 RX ORDER — OXYCODONE AND ACETAMINOPHEN 10; 325 MG/1; MG/1
1 TABLET ORAL EVERY 4 HOURS PRN
Qty: 90 TABLET | Refills: 0 | Status: SHIPPED | OUTPATIENT
Start: 2023-10-24

## 2023-10-24 NOTE — TELEPHONE ENCOUNTER
Medication:  PDMP   1 [id number] 09/26/2023 09/26/2023 ACETAMINOPHEN 325 MG / oxyCODONE HYDROCHLORIDE 10 MG ORAL TABLET (Tablet) 70.0 12 10.0 MG/325.0 MG 87.50 [facility] Other 0 / 0 PA    1 [id number] 09/26/2023 09/26/2023 ACETAMINOPHEN 325 MG / oxyCODONE HYDROCHLORIDE 10 MG ORAL TABLET (Tablet) 20.0 3 10.0 MG/325.0 .0 [facility] Private Pay 0 / 0 PA    1 [id number] 08/29/2023 08/29/2023 ACETAMINOPHEN 325 MG / oxyCODONE HYDROCHLORIDE 10 MG ORAL TABLET (Tablet) 90.0 15 10.0 MG/325.0 MG 90.0 [facility] Other 0 / 0 PA          Active agreement on file -Yes

## 2023-11-20 DIAGNOSIS — G89.4 CHRONIC PAIN SYNDROME: ICD-10-CM

## 2023-11-20 RX ORDER — OXYCODONE AND ACETAMINOPHEN 10; 325 MG/1; MG/1
1 TABLET ORAL EVERY 4 HOURS PRN
Qty: 90 TABLET | Refills: 0 | OUTPATIENT
Start: 2023-11-20

## 2023-11-29 RX ORDER — OXYCODONE AND ACETAMINOPHEN 10; 325 MG/1; MG/1
1 TABLET ORAL EVERY 4 HOURS PRN
Qty: 90 TABLET | Refills: 0 | Status: SHIPPED | OUTPATIENT
Start: 2023-11-29

## 2023-12-18 ENCOUNTER — OFFICE VISIT (OUTPATIENT)
Dept: FAMILY MEDICINE CLINIC | Facility: CLINIC | Age: 63
End: 2023-12-18
Payer: COMMERCIAL

## 2023-12-18 VITALS
DIASTOLIC BLOOD PRESSURE: 82 MMHG | HEIGHT: 70 IN | SYSTOLIC BLOOD PRESSURE: 132 MMHG | WEIGHT: 257 LBS | OXYGEN SATURATION: 97 % | HEART RATE: 84 BPM | BODY MASS INDEX: 36.79 KG/M2

## 2023-12-18 DIAGNOSIS — M51.36 DDD (DEGENERATIVE DISC DISEASE), LUMBAR: ICD-10-CM

## 2023-12-18 DIAGNOSIS — I10 PRIMARY HYPERTENSION: Primary | ICD-10-CM

## 2023-12-18 DIAGNOSIS — G47.00 INSOMNIA, UNSPECIFIED TYPE: ICD-10-CM

## 2023-12-18 DIAGNOSIS — M25.562 LEFT KNEE PAIN, UNSPECIFIED CHRONICITY: ICD-10-CM

## 2023-12-18 DIAGNOSIS — Z72.0 TOBACCO USE: ICD-10-CM

## 2023-12-18 DIAGNOSIS — R73.03 PREDIABETES: ICD-10-CM

## 2023-12-18 DIAGNOSIS — R76.8 ELEVATED RHEUMATOID FACTOR: ICD-10-CM

## 2023-12-18 DIAGNOSIS — M77.8 TENDONITIS OF SHOULDER, RIGHT: ICD-10-CM

## 2023-12-18 DIAGNOSIS — M19.90 ARTHRITIS: ICD-10-CM

## 2023-12-18 DIAGNOSIS — F11.20 CONTINUOUS OPIOID DEPENDENCE (HCC): ICD-10-CM

## 2023-12-18 DIAGNOSIS — G25.81 RLS (RESTLESS LEGS SYNDROME): ICD-10-CM

## 2023-12-18 DIAGNOSIS — K21.9 GASTROESOPHAGEAL REFLUX DISEASE, UNSPECIFIED WHETHER ESOPHAGITIS PRESENT: ICD-10-CM

## 2023-12-18 DIAGNOSIS — G89.4 CHRONIC PAIN SYNDROME: ICD-10-CM

## 2023-12-18 DIAGNOSIS — E78.5 HYPERLIPIDEMIA, UNSPECIFIED HYPERLIPIDEMIA TYPE: ICD-10-CM

## 2023-12-18 DIAGNOSIS — M10.9 GOUT, UNSPECIFIED CAUSE, UNSPECIFIED CHRONICITY, UNSPECIFIED SITE: ICD-10-CM

## 2023-12-18 PROCEDURE — 20610 DRAIN/INJ JOINT/BURSA W/O US: CPT | Performed by: FAMILY MEDICINE

## 2023-12-18 PROCEDURE — 99214 OFFICE O/P EST MOD 30 MIN: CPT | Performed by: FAMILY MEDICINE

## 2023-12-18 RX ORDER — ALLOPURINOL 100 MG/1
100 TABLET ORAL DAILY
Qty: 90 TABLET | Refills: 3 | Status: SHIPPED | OUTPATIENT
Start: 2023-12-18

## 2023-12-18 RX ORDER — TRAZODONE HYDROCHLORIDE 100 MG/1
100 TABLET ORAL
Qty: 30 TABLET | Refills: 5 | Status: SHIPPED | OUTPATIENT
Start: 2023-12-18

## 2023-12-18 RX ORDER — METHYLPREDNISOLONE ACETATE 80 MG/ML
1 INJECTION, SUSPENSION INTRA-ARTICULAR; INTRALESIONAL; INTRAMUSCULAR; SOFT TISSUE
Status: COMPLETED | OUTPATIENT
Start: 2023-12-18 | End: 2023-12-18

## 2023-12-18 RX ADMIN — METHYLPREDNISOLONE ACETATE 1 ML: 80 INJECTION, SUSPENSION INTRA-ARTICULAR; INTRALESIONAL; INTRAMUSCULAR; SOFT TISSUE at 16:40

## 2023-12-18 NOTE — PROGRESS NOTES
Name: Sameer Cali      : 1960      MRN: [id number]  Encounter Provider: Sg Evans MD  Encounter Date: 2023   Encounter department: [facility] [address]    Assessment & Plan   Return visit in 4 months with fasting blood prior to visit.  1. Primary hypertension  Assessment & Plan:  Continue lisinopril 20 mg daily      2. DDD (degenerative disc disease), lumbar    3. Arthritis    4. Chronic pain syndrome    5. Continuous opioid dependence (HCC)    6. Gastroesophageal reflux disease, unspecified whether esophagitis present    7. Gout, unspecified cause, unspecified chronicity, unspecified site  Assessment & Plan:  Start allopurinol 100 mg daily to lower his uric acid and decrease gout attacks.  He will continue colchicine 0.6 mg twice daily.    Orders:  -     allopurinol (ZYLOPRIM) 100 mg tablet; Take 1 tablet (100 mg total) by mouth daily  -     Uric acid; Future    8. Tendonitis of shoulder, right    9. Elevated rheumatoid factor    10. Tobacco use    11. RLS (restless legs syndrome)  Assessment & Plan:  Continue ropinirole 1 mg nightly      12. Left knee pain, unspecified chronicity  -     XR knee 3 vw left non injury; Future; Expected date: 2023    13. Prediabetes  -     Hemoglobin A1C; Future    14. Insomnia, unspecified type  Assessment & Plan:  Start trazodone 100 mg nightly.  May increase to 203 mg if needed.    Orders:  -     traZODone (DESYREL) 100 mg tablet; Take 1 tablet (100 mg total) by mouth daily at bedtime May increase to 200mg and 300 mg prn    15. Hyperlipidemia, unspecified hyperlipidemia type  -     CBC and differential; Future  -     Comprehensive metabolic panel; Future  -     Lipid panel; Future           Subjective      Patient comes in for checkup.  He has chronic back and shoulder pain for which he takes oxycodone.  He feels this allows him to remain active.  He has opioid agreement on file.  Today he complains of left knee pain  "and worsening right shoulder pain.  He also has had an episode of gout since he was here last.  He also complains of difficulty sleeping.    Shoulder Pain       Review of Systems   HENT: Negative.     Respiratory: Negative.     Cardiovascular: Negative.    Gastrointestinal: Negative.    Musculoskeletal:  Positive for arthralgias and back pain.       Current Outpatient Medications on File Prior to Visit   Medication Sig    colchicine (COLCRYS) 0.6 mg tablet TAKE 1 TABLET TWICE A DAY AND EVERY 3 HOURS AS NEEDED FOR GOUT AS DIRECTED    indomethacin (INDOCIN) 50 mg capsule Take 1 capsule (50 mg total) by mouth 3 (three) times a day with meals    levocetirizine (XYZAL) 5 MG tablet Take 1 tablet (5 mg total) by mouth every evening    lisinopril (ZESTRIL) 20 mg tablet TAKE 1 TABLET DAILY    Melatonin 1 MG CAPS Take 1 mg by mouth in the morning    Multiple Vitamins-Minerals (multivitamin with minerals) tablet Take 1 tablet by mouth daily    naproxen (NAPROSYN) 500 mg tablet     omeprazole (PriLOSEC) 40 MG capsule TAKE 1 CAPSULE DAILY    oxyCODONE-acetaminophen (Percocet)  mg per tablet Take 1 tablet by mouth every 4 (four) hours as needed for severe pain Max Daily Amount: 6 tablets    rOPINIRole (REQUIP) 1 mg tablet TAKE 1 TABLET DAILY AT BEDTIME       Objective     /82 (BP Location: Left arm, Patient Position: Sitting, Cuff Size: Standard)   Pulse 84   Ht 5' 10\" (1.778 m)   Wt 117 kg (257 lb)   SpO2 97%   BMI 36.88 kg/m²     Physical Exam  Constitutional:       Appearance: Normal appearance. He is well-developed. He is obese.   HENT:      Head: Normocephalic and atraumatic.   Eyes:      Pupils: Pupils are equal, round, and reactive to light.   Cardiovascular:      Rate and Rhythm: Normal rate and regular rhythm.      Heart sounds: Normal heart sounds.   Pulmonary:      Effort: Pulmonary effort is normal.      Breath sounds: Normal breath sounds.   Musculoskeletal:         General: Normal range of motion. "      Cervical back: Neck supple.      Comments: Painful range of motion right shoulder flexion and extension are limited to 90 degrees.   Skin:     General: Skin is warm and dry.   Neurological:      Mental Status: He is alert.   Psychiatric:         Mood and Affect: Mood normal.         Behavior: Behavior normal.     Large joint arthrocentesis: R glenohumeral  [place] Protocol:  Consent: Verbal consent obtained.  Risks and benefits: risks, benefits and alternatives were discussed  Consent given by: patient  Patient identity confirmed: verbally with patient  Supporting Documentation  Indications: pain   Procedure Details  Location: shoulder - R glenohumeral  Preparation: Patient was prepped and draped in the usual sterile fashion  Needle size: 25 G  Ultrasound guidance: no  Approach: posterior  Medications administered: 1 mL methylPREDNISolone acetate 80 mg/mL    Patient tolerance: patient tolerated the procedure well with no immediate complications  Dressing:  Sterile dressing applied          Sg Evans MD

## 2023-12-18 NOTE — ASSESSMENT & PLAN NOTE
Start allopurinol 100 mg daily to lower his uric acid and decrease gout attacks.  He will continue colchicine 0.6 mg twice daily.

## 2023-12-19 DIAGNOSIS — G89.4 CHRONIC PAIN SYNDROME: ICD-10-CM

## 2023-12-19 NOTE — TELEPHONE ENCOUNTER
Medication:  PDMP   1 [id number] 11/30/2023 11/29/2023 ACETAMINOPHEN 325 MG / oxyCODONE HYDROCHLORIDE 10 MG ORAL TABLET (Tablet) 90.0 15 10.0 MG/325.0 MG 90.0 [facility] Other 0 / 0 PA    1 [id number] 10/24/2023 10/24/2023 ACETAMINOPHEN 325 MG / oxyCODONE HYDROCHLORIDE 10 MG ORAL TABLET (Tablet) 90.0 15 10.0 MG/325.0 MG 90.0 [facility] Other 0 / 0 PA    1 [id number] 09/26/2023 09/26/2023 ACETAMINOPHEN 325 MG / oxyCODONE HYDROCHLORIDE 10 MG ORAL TABLET (Tablet) 70.0 12 10.0 MG/325.0 MG 87.50 [facility] Other 0 / 0 PA          Active agreement on file -Yes

## 2023-12-20 RX ORDER — OXYCODONE AND ACETAMINOPHEN 10; 325 MG/1; MG/1
1 TABLET ORAL EVERY 4 HOURS PRN
Qty: 90 TABLET | Refills: 0 | Status: SHIPPED | OUTPATIENT
Start: 2023-12-20

## 2023-12-21 DIAGNOSIS — G25.81 RLS (RESTLESS LEGS SYNDROME): ICD-10-CM

## 2023-12-21 RX ORDER — ROPINIROLE 1 MG/1
TABLET, FILM COATED ORAL
Qty: 90 TABLET | Refills: 3 | Status: SHIPPED | OUTPATIENT
Start: 2023-12-21

## 2024-01-16 DIAGNOSIS — G89.4 CHRONIC PAIN SYNDROME: ICD-10-CM

## 2024-01-17 RX ORDER — OXYCODONE AND ACETAMINOPHEN 10; 325 MG/1; MG/1
1 TABLET ORAL EVERY 4 HOURS PRN
Qty: 90 TABLET | Refills: 0 | Status: SHIPPED | OUTPATIENT
Start: 2024-01-17

## 2024-01-17 NOTE — TELEPHONE ENCOUNTER
Medication:  PDMP   1 [id number] 12/21/2023 12/20/2023 ACETAMINOPHEN 325 MG / oxyCODONE HYDROCHLORIDE 10 MG ORAL TABLET (Tablet) 90.0 15 10.0 MG/325.0 MG 90.0 [facility] Other 0 / 0 PA    1 [id number] 11/30/2023 11/29/2023 ACETAMINOPHEN 325 MG / oxyCODONE HYDROCHLORIDE 10 MG ORAL TABLET (Tablet) 90.0 15 10.0 MG/325.0 MG 90.0 [facility] Other 0 / 0 PA    1 [id number] 10/24/2023 10/24/2023 ACETAMINOPHEN 325 MG / oxyCODONE HYDROCHLORIDE 10 MG ORAL TABLET (Tablet) 90.0 15 10.0 MG/325.0 MG 90.0 [facility] Other 0 / 0 PA    1 [id number] 09/26/2023 09/26/2023 ACETAMINOPHEN 325 MG / oxyCODONE HYDROCHLORIDE 10 MG ORAL TABLET (Tablet) 70.0 12 10.0 MG/325.0 MG 87.50 [facility] Other 0 / 0 PA      Active agreement on file -Yes

## 2024-02-21 PROBLEM — Z00.00 HEALTH MAINTENANCE EXAMINATION: Status: RESOLVED | Noted: 2019-09-10 | Resolved: 2024-02-21

## 2024-02-25 DIAGNOSIS — G89.4 CHRONIC PAIN SYNDROME: ICD-10-CM

## 2024-02-25 RX ORDER — OXYCODONE AND ACETAMINOPHEN 10; 325 MG/1; MG/1
1 TABLET ORAL EVERY 4 HOURS PRN
Qty: 90 TABLET | Refills: 0 | Status: SHIPPED | OUTPATIENT
Start: 2024-02-25 | End: 2024-02-27

## 2024-02-26 DIAGNOSIS — G89.4 CHRONIC PAIN SYNDROME: ICD-10-CM

## 2024-02-26 RX ORDER — OXYCODONE AND ACETAMINOPHEN 10; 325 MG/1; MG/1
1 TABLET ORAL EVERY 4 HOURS PRN
Qty: 90 TABLET | Refills: 0 | Status: CANCELLED | OUTPATIENT
Start: 2024-02-26

## 2024-02-26 NOTE — TELEPHONE ENCOUNTER
Medication:  PDMP     [id number] 01/18/2024 01/17/2024 ACETAMINOPHEN 325 MG / oxyCODONE HYDROCHLORIDE 10 MG ORAL TABLET (Tablet) 90.0 15 10.0 MG/325.0 MG 90.0 [facility] Other 0 / 0 PA     1 [id number] 12/21/2023 12/20/2023 ACETAMINOPHEN 325 MG / oxyCODONE HYDROCHLORIDE 10 MG ORAL TABLET (Tablet) 90.0 15 10.0 MG/325.0 MG 90.0 [facility] Other 0 / 0 PA    1 [id number] 11/30/2023 11/29/2023 ACETAMINOPHEN 325 MG / oxyCODONE HYDROCHLORIDE 10 MG ORAL TABLET (Tablet) 90.0 15 10.0 MG/325.0 MG 90.0 [facility] Other 0 / 0 PA    1 [id number] 10/24/2023 10/24/2023 ACETAMINOPHEN 325 MG / oxyCODONE HYDROCHLORIDE 10 MG ORAL TABLET (Tablet) 90.0 15 10.0 MG/325.0 MG 90.0 [facility] Other 0 / 0 PA    1 [id number] 09/26/2023 09/26/2023 ACETAMINOPHEN 325 MG / oxyCODONE HYDROCHLORIDE 10 MG ORAL TABLET (Tablet) 70.0 12 10.0 MG/325.0 MG 87.50 [facility] Other 0 / 0 PA      Active agreement on file -Yes

## 2024-02-27 ENCOUNTER — TELEPHONE (OUTPATIENT)
Dept: FAMILY MEDICINE CLINIC | Facility: CLINIC | Age: 64
End: 2024-02-27

## 2024-02-27 DIAGNOSIS — G89.4 CHRONIC PAIN SYNDROME: ICD-10-CM

## 2024-02-27 RX ORDER — OXYCODONE AND ACETAMINOPHEN 10; 325 MG/1; MG/1
1 TABLET ORAL EVERY 4 HOURS PRN
Qty: 90 TABLET | Refills: 0 | Status: SHIPPED | OUTPATIENT
Start: 2024-02-27

## 2024-02-27 NOTE — TELEPHONE ENCOUNTER
T/c from pts wife -- requesting another provider take care of this as Dr Evans is out of the office, pt is over due.

## 2024-02-27 NOTE — TELEPHONE ENCOUNTER
T/c from [facility] -- reporting that due to their electronic system pts script for oxycodone has not come through (reports they spoke briefly with Dr Evans on 2/26).  Reporting they will need paper script for pt, as discussed, as the script did not come through today, as was being hoped.    Notified [facility] that Dr Evans is out of the office until Thursday

## 2024-03-30 DIAGNOSIS — K21.9 GASTROESOPHAGEAL REFLUX DISEASE: ICD-10-CM

## 2024-03-30 DIAGNOSIS — G89.4 CHRONIC PAIN SYNDROME: ICD-10-CM

## 2024-03-30 DIAGNOSIS — I10 HYPERTENSION, UNSPECIFIED TYPE: ICD-10-CM

## 2024-03-30 DIAGNOSIS — M25.50 ARTHRALGIA, UNSPECIFIED JOINT: ICD-10-CM

## 2024-04-01 RX ORDER — LISINOPRIL 20 MG/1
20 TABLET ORAL DAILY
Qty: 90 TABLET | Refills: 0 | Status: SHIPPED | OUTPATIENT
Start: 2024-04-01

## 2024-04-01 RX ORDER — OXYCODONE AND ACETAMINOPHEN 10; 325 MG/1; MG/1
1 TABLET ORAL EVERY 4 HOURS PRN
Qty: 90 TABLET | Refills: 0 | Status: SHIPPED | OUTPATIENT
Start: 2024-04-01

## 2024-04-01 RX ORDER — OMEPRAZOLE 40 MG/1
40 CAPSULE, DELAYED RELEASE ORAL DAILY
Qty: 90 CAPSULE | Refills: 0 | Status: SHIPPED | OUTPATIENT
Start: 2024-04-01

## 2024-04-01 RX ORDER — INDOMETHACIN 50 MG/1
50 CAPSULE ORAL
Qty: 30 CAPSULE | Refills: 0 | Status: SHIPPED | OUTPATIENT
Start: 2024-04-01

## 2024-04-01 NOTE — TELEPHONE ENCOUNTER
Medication:  PDMP   1 [id number] 03/01/2024 02/27/2024 ACETAMINOPHEN 325 MG / oxyCODONE HYDROCHLORIDE 10 MG ORAL TABLET (Tablet) 90.0 15 10.0 MG/325.0 MG 90.0 [facility] Other 0 / 0 PA    1 [id number] 01/18/2024 01/17/2024 ACETAMINOPHEN 325 MG / oxyCODONE HYDROCHLORIDE 10 MG ORAL TABLET (Tablet) 90.0 15 10.0 MG/325.0 MG 90.0 [facility] Other 0 / 0 PA    1 [id number] 12/21/2023 12/20/2023 ACETAMINOPHEN 325 MG / oxyCODONE HYDROCHLORIDE 10 MG ORAL TABLET (Tablet) 90.0 15 10.0 MG/325.0 MG 90.0 [facility] Other 0 / 0 PA    1 [id number] 11/30/2023 11/29/2023 ACETAMINOPHEN 325 MG / oxyCODONE HYDROCHLORIDE 10 MG ORAL TABLET (Tablet) 90.0 15 10.0 MG/325.0 MG 90.0 [facility] Other 0 / 0 PA        Active agreement on file -Yes

## 2024-04-22 ENCOUNTER — TELEPHONE (OUTPATIENT)
Dept: FAMILY MEDICINE CLINIC | Facility: CLINIC | Age: 64
End: 2024-04-22

## 2024-04-22 DIAGNOSIS — M10.9 ACUTE GOUT, UNSPECIFIED CAUSE, UNSPECIFIED SITE: Primary | ICD-10-CM

## 2024-04-22 RX ORDER — PREDNISONE 10 MG/1
TABLET ORAL
Qty: 30 TABLET | Refills: 0 | Status: SHIPPED | OUTPATIENT
Start: 2024-04-22

## 2024-04-22 NOTE — TELEPHONE ENCOUNTER
T/c from pts spouse - pt is experiencing  Intense joint pain  - has had an episode of gout -  asking for a new script of prednisone - rx'd on 12/18/2023, Pat states prednisone worked a miracle for pt     Unrelated to this message - Today pt complains of left knee pain - will have XR knee 3 vw left non injury done - notes he will complete this within a week      Please review and advise

## 2024-05-06 DIAGNOSIS — I10 HYPERTENSION, UNSPECIFIED TYPE: ICD-10-CM

## 2024-05-06 DIAGNOSIS — G89.4 CHRONIC PAIN SYNDROME: ICD-10-CM

## 2024-05-06 DIAGNOSIS — M25.50 ARTHRALGIA, UNSPECIFIED JOINT: ICD-10-CM

## 2024-05-06 RX ORDER — OXYCODONE AND ACETAMINOPHEN 10; 325 MG/1; MG/1
1 TABLET ORAL EVERY 4 HOURS PRN
Qty: 90 TABLET | Refills: 0 | Status: SHIPPED | OUTPATIENT
Start: 2024-05-06

## 2024-05-06 RX ORDER — LISINOPRIL 20 MG/1
20 TABLET ORAL DAILY
Qty: 90 TABLET | Refills: 1 | Status: SHIPPED | OUTPATIENT
Start: 2024-05-06

## 2024-05-06 RX ORDER — INDOMETHACIN 50 MG/1
50 CAPSULE ORAL
Qty: 270 CAPSULE | Refills: 1 | Status: SHIPPED | OUTPATIENT
Start: 2024-05-06

## 2024-06-02 DIAGNOSIS — G89.4 CHRONIC PAIN SYNDROME: ICD-10-CM

## 2024-06-03 ENCOUNTER — HOSPITAL ENCOUNTER (OUTPATIENT)
Dept: RADIOLOGY | Facility: HOSPITAL | Age: 64
Discharge: HOME | End: 2024-06-03
Payer: COMMERCIAL

## 2024-06-03 ENCOUNTER — TELEPHONE (OUTPATIENT)
Age: 64
End: 2024-06-03

## 2024-06-03 DIAGNOSIS — M25.562 LEFT KNEE PAIN, UNSPECIFIED CHRONICITY: ICD-10-CM

## 2024-06-03 PROCEDURE — 73562 X-RAY EXAM OF KNEE 3: CPT

## 2024-06-03 RX ORDER — OXYCODONE AND ACETAMINOPHEN 10; 325 MG/1; MG/1
1 TABLET ORAL EVERY 4 HOURS PRN
Qty: 90 TABLET | Refills: 0 | Status: SHIPPED | OUTPATIENT
Start: 2024-06-03

## 2024-06-03 NOTE — TELEPHONE ENCOUNTER
Wife calling on behalf of patient to schedule appointment to discuss knee radiograph(s). No appointments available for today and unable to schedule Same-Day/Next-Day visits at this time, recommended to call both offices for potential appointments, but will leave a message regardless. Asking to specifically see Dr Evans.     Please advise and return wife's call if anything opens in the schedule. Mentioned patient is not working today and can come in.

## 2024-06-05 ENCOUNTER — TELEPHONE (OUTPATIENT)
Age: 64
End: 2024-06-05

## 2024-06-05 NOTE — TELEPHONE ENCOUNTER
PA for oxyCODONE-acetaminophen (Percocet)  mg     Submitted via    [][employer]   [x][employer]-Case ID # [id number]   []Faxed to plan   []Other website   []Phone call Case ID #     Office notes sent, clinical questions answered. Awaiting determination    Turnaround time for your insurance to make a decision on your Prior Authorization can take 7-21 business days.

## 2024-06-05 NOTE — TELEPHONE ENCOUNTER
PA for  oxyCODONE-acetaminophen (Percocet)  mg  Denied    Reason:    Message sent to provider pool Yes    Denial letter scanned into Media Yes    Appeal started No    (Provider will need to decide if appeal is warranted and send clinical documentation to PA team for initiation.)

## 2024-06-10 ENCOUNTER — OFFICE VISIT (OUTPATIENT)
Dept: FAMILY MEDICINE CLINIC | Facility: CLINIC | Age: 64
End: 2024-06-10
Payer: COMMERCIAL

## 2024-06-10 VITALS
HEART RATE: 80 BPM | DIASTOLIC BLOOD PRESSURE: 80 MMHG | OXYGEN SATURATION: 95 % | WEIGHT: 255 LBS | HEIGHT: 70 IN | BODY MASS INDEX: 36.51 KG/M2 | SYSTOLIC BLOOD PRESSURE: 122 MMHG | TEMPERATURE: 98 F

## 2024-06-10 DIAGNOSIS — F11.20 CONTINUOUS OPIOID DEPENDENCE (HCC): ICD-10-CM

## 2024-06-10 DIAGNOSIS — G89.4 CHRONIC PAIN SYNDROME: ICD-10-CM

## 2024-06-10 DIAGNOSIS — I10 PRIMARY HYPERTENSION: ICD-10-CM

## 2024-06-10 DIAGNOSIS — M17.12 ARTHRITIS OF KNEE, LEFT: Primary | ICD-10-CM

## 2024-06-10 DIAGNOSIS — G25.81 RLS (RESTLESS LEGS SYNDROME): ICD-10-CM

## 2024-06-10 DIAGNOSIS — M51.36 DDD (DEGENERATIVE DISC DISEASE), LUMBAR: ICD-10-CM

## 2024-06-10 DIAGNOSIS — M10.9 GOUT, UNSPECIFIED CAUSE, UNSPECIFIED CHRONICITY, UNSPECIFIED SITE: ICD-10-CM

## 2024-06-10 PROCEDURE — 96372 THER/PROPH/DIAG INJ SC/IM: CPT | Performed by: FAMILY MEDICINE

## 2024-06-10 PROCEDURE — 20610 DRAIN/INJ JOINT/BURSA W/O US: CPT | Performed by: FAMILY MEDICINE

## 2024-06-10 PROCEDURE — 99214 OFFICE O/P EST MOD 30 MIN: CPT | Performed by: FAMILY MEDICINE

## 2024-06-10 RX ORDER — METHYLPREDNISOLONE ACETATE 80 MG/ML
0.5 INJECTION, SUSPENSION INTRA-ARTICULAR; INTRALESIONAL; INTRAMUSCULAR; SOFT TISSUE
Status: COMPLETED | OUTPATIENT
Start: 2024-06-10 | End: 2024-06-10

## 2024-06-10 RX ADMIN — METHYLPREDNISOLONE ACETATE 0.5 ML: 80 INJECTION, SUSPENSION INTRA-ARTICULAR; INTRALESIONAL; INTRAMUSCULAR; SOFT TISSUE at 09:00

## 2024-06-10 NOTE — PROGRESS NOTES
Depression Screening and Follow-up Plan: Patient was screened for depression during today's encounter. They screened negative with a PHQ-2 score of 0.    Assessment/Plan:  Return visit in 1 month with fasting blood prior to visit.       Problem List Items Addressed This Visit       Gout     Continue colchicine 0.6 mg twice daily.  Discontinue allopurinol.         RLS (restless legs syndrome)     Continue Requip 1 mg nightly         DDD (degenerative disc disease), lumbar    Hypertension     Continue lisinopril 20 mg daily         Continuous opioid dependence (HCC)    Chronic pain syndrome    Arthritis of knee, left - Primary         Subjective:      Patient ID: Sameer Cali is a 64 y.o. male.    Patient comes in with left knee pain.  Recent x-ray shows severe osteoarthritis.  He felt this was a gout attack and was taking prednisone without relief.  He added allopurinol which made his symptoms worse.  He continues to take colchicine 0.6 mg twice daily.        The following portions of the patient's history were reviewed and updated as appropriate:   Past Medical History:  He has a past medical history of Arthritis, Gout, and Hypertension.,  _______________________________________________________________________  Medical Problems:  does not have any pertinent problems on file.,  _______________________________________________________________________  Past Surgical History:   has a past surgical history that includes Cataract extraction; Knee surgery; Rotator cuff repair; Back surgery; and pr arthrp acetblr/prox fem prostc agrft/algrft (Right, 10/13/2022).,  _______________________________________________________________________  Family History:  family history includes Esophageal cancer in his father; Lung cancer in his father; Pancreatic cancer in his father; Stroke in his family; Thyroid nodules in his father.,  _______________________________________________________________________  Social History:   reports that  he quit smoking about 33 years ago. His smoking use included cigars. He started smoking about 42 years ago. He has never used smokeless tobacco. He reports current alcohol use of about 1.0 standard drink of alcohol per week. He reports that he does not use drugs.,  _______________________________________________________________________  Allergies:  has No Known Allergies..  _______________________________________________________________________  Current Outpatient Medications   Medication Sig Dispense Refill    colchicine (COLCRYS) 0.6 mg tablet TAKE 1 TABLET TWICE A DAY AND EVERY 3 HOURS AS NEEDED FOR GOUT AS DIRECTED 200 tablet 3    indomethacin (INDOCIN) 50 mg capsule Take 1 capsule (50 mg total) by mouth 3 (three) times a day with meals 270 capsule 1    levocetirizine (XYZAL) 5 MG tablet Take 1 tablet (5 mg total) by mouth every evening 90 tablet 0    lisinopril (ZESTRIL) 20 mg tablet Take 1 tablet (20 mg total) by mouth daily 90 tablet 1    Melatonin 1 MG CAPS Take 1 mg by mouth in the morning      Multiple Vitamins-Minerals (multivitamin with minerals) tablet Take 1 tablet by mouth daily 30 tablet 0    naproxen (NAPROSYN) 500 mg tablet       omeprazole (PriLOSEC) 40 MG capsule Take 1 capsule (40 mg total) by mouth daily 90 capsule 0    oxyCODONE-acetaminophen (Percocet)  mg per tablet Take 1 tablet by mouth every 4 (four) hours as needed for severe pain Max Daily Amount: 6 tablets 90 tablet 0    predniSONE 10 mg tablet 4 daily x 3 days, 3 daily x 3 days, 2 daily x 3 days, 1 daily x 3 days. 30 tablet 0    rOPINIRole (REQUIP) 1 mg tablet TAKE 1 TABLET DAILY AT BEDTIME 90 tablet 3    traZODone (DESYREL) 100 mg tablet Take 1 tablet (100 mg total) by mouth daily at bedtime May increase to 200mg and 300 mg prn 30 tablet 5     Current Facility-Administered Medications   Medication Dose Route Frequency Provider Last Rate Last Admin    cyanocobalamin injection 1,000 mcg  1,000 mcg Intramuscular Q30 Days Sg  "MD Nathan   1,000 mcg at 05/11/18 1643     _______________________________________________________________________  Review of Systems   Constitutional: Negative.    Respiratory: Negative.     Cardiovascular: Negative.    Musculoskeletal:  Positive for arthralgias.         Objective:  Vitals:    06/10/24 0854   BP: 122/80   BP Location: Left arm   Patient Position: Sitting   Cuff Size: Standard   Pulse: 80   Temp: 98 °F (36.7 °C)   TempSrc: Tympanic   SpO2: 95%   Weight: 116 kg (255 lb)   Height: 5' 10\" (1.778 m)     Body mass index is 36.59 kg/m².     Physical Exam  Constitutional:       Appearance: He is obese.   HENT:      Head: Normocephalic and atraumatic.   Musculoskeletal:      Comments: Arthritic appearing left knee.  Crepitus on range of motion.   Neurological:      Mental Status: He is alert.   Psychiatric:         Mood and Affect: Mood normal.         Behavior: Behavior normal.       Large joint arthrocentesis: L knee  Universal Protocol:  Consent: Verbal consent obtained.  Risks and benefits: risks, benefits and alternatives were discussed  Consent given by: patient  Patient identity confirmed: verbally with patient  Supporting Documentation  Indications: pain   Procedure Details  Location: knee - L knee  Needle size: 25 G  Ultrasound guidance: no  Approach: anterolateral  Medications administered: 0.5 mL methylPREDNISolone acetate 80 mg/mL    Patient tolerance: patient tolerated the procedure well with no immediate complications  Dressing:  Sterile dressing applied          "

## 2024-07-01 DIAGNOSIS — K21.9 GASTROESOPHAGEAL REFLUX DISEASE: ICD-10-CM

## 2024-07-01 RX ORDER — OMEPRAZOLE 40 MG/1
40 CAPSULE, DELAYED RELEASE ORAL DAILY
Qty: 90 CAPSULE | Refills: 1 | Status: SHIPPED | OUTPATIENT
Start: 2024-07-01

## 2024-07-02 DIAGNOSIS — G89.4 CHRONIC PAIN SYNDROME: ICD-10-CM

## 2024-07-02 RX ORDER — OXYCODONE AND ACETAMINOPHEN 10; 325 MG/1; MG/1
1 TABLET ORAL EVERY 4 HOURS PRN
Qty: 90 TABLET | Refills: 0 | Status: SHIPPED | OUTPATIENT
Start: 2024-07-02

## 2024-08-04 DIAGNOSIS — G89.4 CHRONIC PAIN SYNDROME: ICD-10-CM

## 2024-08-05 RX ORDER — OXYCODONE AND ACETAMINOPHEN 10; 325 MG/1; MG/1
1 TABLET ORAL EVERY 4 HOURS PRN
Qty: 90 TABLET | Refills: 0 | Status: SHIPPED | OUTPATIENT
Start: 2024-08-05

## 2024-09-04 DIAGNOSIS — G47.00 INSOMNIA, UNSPECIFIED TYPE: ICD-10-CM

## 2024-09-04 DIAGNOSIS — G89.4 CHRONIC PAIN SYNDROME: ICD-10-CM

## 2024-09-04 RX ORDER — TRAZODONE HYDROCHLORIDE 100 MG/1
100 TABLET ORAL
Qty: 30 TABLET | Refills: 5 | Status: SHIPPED | OUTPATIENT
Start: 2024-09-04

## 2024-09-07 RX ORDER — OXYCODONE AND ACETAMINOPHEN 10; 325 MG/1; MG/1
1 TABLET ORAL EVERY 4 HOURS PRN
Qty: 90 TABLET | Refills: 0 | Status: SHIPPED | OUTPATIENT
Start: 2024-09-07

## 2024-09-16 DIAGNOSIS — M10.9 GOUT, UNSPECIFIED CAUSE, UNSPECIFIED CHRONICITY, UNSPECIFIED SITE: ICD-10-CM

## 2024-09-16 RX ORDER — COLCHICINE 0.6 MG/1
TABLET ORAL
Qty: 200 TABLET | Refills: 1 | Status: SHIPPED | OUTPATIENT
Start: 2024-09-16

## 2024-09-24 ENCOUNTER — TELEPHONE (OUTPATIENT)
Dept: FAMILY MEDICINE CLINIC | Facility: CLINIC | Age: 64
End: 2024-09-24

## 2024-09-24 ENCOUNTER — APPOINTMENT (OUTPATIENT)
Dept: LAB | Facility: HOSPITAL | Age: 64
End: 2024-09-24
Payer: COMMERCIAL

## 2024-09-24 DIAGNOSIS — R73.03 PREDIABETES: ICD-10-CM

## 2024-09-24 DIAGNOSIS — M10.9 GOUT, UNSPECIFIED CAUSE, UNSPECIFIED CHRONICITY, UNSPECIFIED SITE: ICD-10-CM

## 2024-09-24 DIAGNOSIS — E78.5 HYPERLIPIDEMIA, UNSPECIFIED HYPERLIPIDEMIA TYPE: ICD-10-CM

## 2024-09-24 LAB
ALBUMIN SERPL BCG-MCNC: 4.3 G/DL (ref 3.5–5)
ALP SERPL-CCNC: 69 U/L (ref 34–104)
ALT SERPL W P-5'-P-CCNC: 17 U/L (ref 7–52)
ANION GAP SERPL CALCULATED.3IONS-SCNC: 6 MMOL/L (ref 4–13)
AST SERPL W P-5'-P-CCNC: 18 U/L (ref 13–39)
BASOPHILS # BLD AUTO: 0.07 THOUSANDS/ΜL (ref 0–0.1)
BASOPHILS NFR BLD AUTO: 1 % (ref 0–1)
BILIRUB SERPL-MCNC: 0.57 MG/DL (ref 0.2–1)
BUN SERPL-MCNC: 21 MG/DL (ref 5–25)
CALCIUM SERPL-MCNC: 9 MG/DL (ref 8.4–10.2)
CHLORIDE SERPL-SCNC: 106 MMOL/L (ref 96–108)
CHOLEST SERPL-MCNC: 156 MG/DL
CO2 SERPL-SCNC: 28 MMOL/L (ref 21–32)
CREAT SERPL-MCNC: 1.13 MG/DL (ref 0.6–1.3)
EOSINOPHIL # BLD AUTO: 0.35 THOUSAND/ΜL (ref 0–0.61)
EOSINOPHIL NFR BLD AUTO: 5 % (ref 0–6)
ERYTHROCYTE [DISTWIDTH] IN BLOOD BY AUTOMATED COUNT: 13.6 % (ref 11.6–15.1)
EST. AVERAGE GLUCOSE BLD GHB EST-MCNC: 111 MG/DL
GFR SERPL CREATININE-BSD FRML MDRD: 69 ML/MIN/1.73SQ M
GLUCOSE P FAST SERPL-MCNC: 96 MG/DL (ref 65–99)
HBA1C MFR BLD: 5.5 %
HCT VFR BLD AUTO: 42.5 % (ref 36.5–49.3)
HDLC SERPL-MCNC: 38 MG/DL
HGB BLD-MCNC: 14.5 G/DL (ref 12–17)
IMM GRANULOCYTES # BLD AUTO: 0.01 THOUSAND/UL (ref 0–0.2)
IMM GRANULOCYTES NFR BLD AUTO: 0 % (ref 0–2)
LDLC SERPL CALC-MCNC: 92 MG/DL (ref 0–100)
LYMPHOCYTES # BLD AUTO: 1.92 THOUSANDS/ΜL (ref 0.6–4.47)
LYMPHOCYTES NFR BLD AUTO: 29 % (ref 14–44)
MCH RBC QN AUTO: 30.5 PG (ref 26.8–34.3)
MCHC RBC AUTO-ENTMCNC: 34.1 G/DL (ref 31.4–37.4)
MCV RBC AUTO: 89 FL (ref 82–98)
MONOCYTES # BLD AUTO: 0.85 THOUSAND/ΜL (ref 0.17–1.22)
MONOCYTES NFR BLD AUTO: 13 % (ref 4–12)
NEUTROPHILS # BLD AUTO: 3.51 THOUSANDS/ΜL (ref 1.85–7.62)
NEUTS SEG NFR BLD AUTO: 52 % (ref 43–75)
NONHDLC SERPL-MCNC: 118 MG/DL
NRBC BLD AUTO-RTO: 0 /100 WBCS
PLATELET # BLD AUTO: 264 THOUSANDS/UL (ref 149–390)
PMV BLD AUTO: 9.5 FL (ref 8.9–12.7)
POTASSIUM SERPL-SCNC: 4.3 MMOL/L (ref 3.5–5.3)
PROT SERPL-MCNC: 6.9 G/DL (ref 6.4–8.4)
RBC # BLD AUTO: 4.76 MILLION/UL (ref 3.88–5.62)
SODIUM SERPL-SCNC: 140 MMOL/L (ref 135–147)
TRIGL SERPL-MCNC: 129 MG/DL
URATE SERPL-MCNC: 8.8 MG/DL (ref 3.5–8.5)
WBC # BLD AUTO: 6.71 THOUSAND/UL (ref 4.31–10.16)

## 2024-09-24 PROCEDURE — 84550 ASSAY OF BLOOD/URIC ACID: CPT

## 2024-09-24 PROCEDURE — 36415 COLL VENOUS BLD VENIPUNCTURE: CPT

## 2024-09-24 PROCEDURE — 85025 COMPLETE CBC W/AUTO DIFF WBC: CPT

## 2024-09-24 PROCEDURE — 80061 LIPID PANEL: CPT

## 2024-09-24 PROCEDURE — 83036 HEMOGLOBIN GLYCOSYLATED A1C: CPT

## 2024-09-24 PROCEDURE — 80053 COMPREHEN METABOLIC PANEL: CPT

## 2024-09-24 NOTE — TELEPHONE ENCOUNTER
----- Message from Sg Evans MD sent at 9/24/2024  9:02 AM EDT -----  Call, labs okay.  His and his wife's forms are complete for work he should make appointment for checkup next month.  ----- Message -----  From: Lab, Background User  Sent: 9/24/2024   8:17 AM EDT  To: Sg Evans MD

## 2024-10-04 DIAGNOSIS — G89.4 CHRONIC PAIN SYNDROME: ICD-10-CM

## 2024-10-04 DIAGNOSIS — G47.00 INSOMNIA, UNSPECIFIED TYPE: ICD-10-CM

## 2024-10-04 RX ORDER — OXYCODONE AND ACETAMINOPHEN 10; 325 MG/1; MG/1
1 TABLET ORAL EVERY 4 HOURS PRN
Qty: 90 TABLET | Refills: 0 | Status: SHIPPED | OUTPATIENT
Start: 2024-10-04

## 2024-10-04 RX ORDER — TRAZODONE HYDROCHLORIDE 100 MG/1
100 TABLET ORAL
Qty: 30 TABLET | Refills: 0 | Status: SHIPPED | OUTPATIENT
Start: 2024-10-04

## 2024-10-07 ENCOUNTER — RA CDI HCC (OUTPATIENT)
Dept: OTHER | Facility: HOSPITAL | Age: 64
End: 2024-10-07

## 2024-10-07 NOTE — PROGRESS NOTES
HCC coding opportunities          Chart Reviewed number of suggestions sent to Provider: 1     Patients Insurance        Commercial Insurance: [employer]     E66.01

## 2024-10-14 ENCOUNTER — OFFICE VISIT (OUTPATIENT)
Dept: FAMILY MEDICINE CLINIC | Facility: CLINIC | Age: 64
End: 2024-10-14
Payer: COMMERCIAL

## 2024-10-14 VITALS
BODY MASS INDEX: 36.94 KG/M2 | OXYGEN SATURATION: 95 % | HEIGHT: 70 IN | WEIGHT: 258 LBS | HEART RATE: 78 BPM | DIASTOLIC BLOOD PRESSURE: 74 MMHG | SYSTOLIC BLOOD PRESSURE: 112 MMHG | TEMPERATURE: 97.9 F

## 2024-10-14 DIAGNOSIS — E66.09 CLASS 2 OBESITY DUE TO EXCESS CALORIES WITHOUT SERIOUS COMORBIDITY WITH BODY MASS INDEX (BMI) OF 37.0 TO 37.9 IN ADULT: ICD-10-CM

## 2024-10-14 DIAGNOSIS — G47.00 INSOMNIA, UNSPECIFIED TYPE: ICD-10-CM

## 2024-10-14 DIAGNOSIS — I10 PRIMARY HYPERTENSION: ICD-10-CM

## 2024-10-14 DIAGNOSIS — E78.5 DYSLIPIDEMIA: ICD-10-CM

## 2024-10-14 DIAGNOSIS — Z00.00 HEALTH MAINTENANCE EXAMINATION: Primary | ICD-10-CM

## 2024-10-14 DIAGNOSIS — M51.360 DEGENERATION OF INTERVERTEBRAL DISC OF LUMBAR REGION WITH DISCOGENIC BACK PAIN: ICD-10-CM

## 2024-10-14 DIAGNOSIS — R73.03 PREDIABETES: ICD-10-CM

## 2024-10-14 DIAGNOSIS — G25.81 RLS (RESTLESS LEGS SYNDROME): ICD-10-CM

## 2024-10-14 DIAGNOSIS — G89.4 CHRONIC PAIN SYNDROME: ICD-10-CM

## 2024-10-14 DIAGNOSIS — F11.20 CONTINUOUS OPIOID DEPENDENCE (HCC): ICD-10-CM

## 2024-10-14 DIAGNOSIS — E66.812 CLASS 2 OBESITY DUE TO EXCESS CALORIES WITHOUT SERIOUS COMORBIDITY WITH BODY MASS INDEX (BMI) OF 37.0 TO 37.9 IN ADULT: ICD-10-CM

## 2024-10-14 DIAGNOSIS — M10.9 GOUT, UNSPECIFIED CAUSE, UNSPECIFIED CHRONICITY, UNSPECIFIED SITE: ICD-10-CM

## 2024-10-14 DIAGNOSIS — G62.9 NEUROPATHY: ICD-10-CM

## 2024-10-14 PROCEDURE — 99396 PREV VISIT EST AGE 40-64: CPT | Performed by: FAMILY MEDICINE

## 2024-10-14 PROCEDURE — 99213 OFFICE O/P EST LOW 20 MIN: CPT | Performed by: FAMILY MEDICINE

## 2024-10-14 RX ORDER — SEMAGLUTIDE 0.25 MG/.5ML
INJECTION, SOLUTION SUBCUTANEOUS
Qty: 2 ML | Refills: 0 | Status: SHIPPED | OUTPATIENT
Start: 2024-10-14

## 2024-10-14 NOTE — PROGRESS NOTES
Assessment/Plan:  Return visit in 4 months       Problem List Items Addressed This Visit       Dyslipidemia    Gout    RLS (restless legs syndrome)    Health maintenance examination - Primary    DDD (degenerative disc disease), lumbar    Hypertension    Continuous opioid dependence (HCC)    Chronic pain syndrome    Neuropathy     Wrist stretching exercises and cock up splints recommended.         Prediabetes    Insomnia    Class 2 obesity due to excess calories without serious comorbidity with body mass index (BMI) of 37.0 to 37.9 in adult    Relevant Medications    Semaglutide-Weight Management (Wegovy) 0.25 MG/0.5ML         Subjective:      Patient ID: Sameer Cali is a 64 y.o. male.    Patient comes in for checkup.  He is taking Percocet 10 mg 3 times daily for low back pain and a arthritides.  He feels this allows him to remain active.  He has opioid agreement on file.  He complains of tingling of his hands and fingertips.  He wishes to try to lose weight.        The following portions of the patient's history were reviewed and updated as appropriate:   Past Medical History:  He has a past medical history of Arthritis, Gout, and Hypertension.,  _______________________________________________________________________  Medical Problems:  does not have any pertinent problems on file.,  _______________________________________________________________________  Past Surgical History:   has a past surgical history that includes Cataract extraction; Knee surgery; Rotator cuff repair; Back surgery; and pr arthrp acetblr/prox fem prostc agrft/algrft (Right, 10/13/2022).,  _______________________________________________________________________  Family History:  family history includes Esophageal cancer in his father; Lung cancer in his father; Pancreatic cancer in his father; Stroke in his family; Thyroid nodules in his father.,  _______________________________________________________________________  Social History:    reports that he quit smoking about 33 years ago. His smoking use included cigars. He started smoking about 42 years ago. He has never used smokeless tobacco. He reports current alcohol use of about 1.0 standard drink of alcohol per week. He reports that he does not use drugs.,  _______________________________________________________________________  Allergies:  has No Known Allergies..  _______________________________________________________________________  Current Outpatient Medications   Medication Sig Dispense Refill    colchicine (COLCRYS) 0.6 mg tablet TAKE 1 TABLET TWICE A DAY AND EVERY 3 HOURS AS NEEDED FOR GOUT AS DIRECTED 200 tablet 1    indomethacin (INDOCIN) 50 mg capsule Take 1 capsule (50 mg total) by mouth 3 (three) times a day with meals 270 capsule 1    levocetirizine (XYZAL) 5 MG tablet Take 1 tablet (5 mg total) by mouth every evening 90 tablet 0    lisinopril (ZESTRIL) 20 mg tablet Take 1 tablet (20 mg total) by mouth daily 90 tablet 1    Melatonin 1 MG CAPS Take 1 mg by mouth in the morning      Multiple Vitamins-Minerals (multivitamin with minerals) tablet Take 1 tablet by mouth daily 30 tablet 0    naproxen (NAPROSYN) 500 mg tablet       omeprazole (PriLOSEC) 40 MG capsule TAKE 1 CAPSULE DAILY 90 capsule 1    oxyCODONE-acetaminophen (Percocet)  mg per tablet Take 1 tablet by mouth every 4 (four) hours as needed for severe pain Max Daily Amount: 6 tablets 90 tablet 0    rOPINIRole (REQUIP) 1 mg tablet TAKE 1 TABLET DAILY AT BEDTIME 90 tablet 3    Semaglutide-Weight Management (Wegovy) 0.25 MG/0.5ML Inject 0.25 mg under the skin weekly 2 mL 0    traZODone (DESYREL) 100 mg tablet Take 1 tablet (100 mg total) by mouth daily at bedtime May increase to 200mg and 300 mg prn 30 tablet 0     Current Facility-Administered Medications   Medication Dose Route Frequency Provider Last Rate Last Admin    cyanocobalamin injection 1,000 mcg  1,000 mcg Intramuscular Q30 Days Sg Evans MD   1,000 mcg at  "05/11/18 1643     _______________________________________________________________________  Review of Systems   Constitutional: Negative.    Respiratory: Negative.     Cardiovascular: Negative.    Musculoskeletal:  Positive for arthralgias and back pain.         Objective:  Vitals:    10/14/24 1402   BP: 112/74   Pulse: 78   Temp: 97.9 °F (36.6 °C)   SpO2: 95%   Weight: 117 kg (258 lb)   Height: 5' 10\" (1.778 m)     Body mass index is 37.02 kg/m².     Physical Exam  Constitutional:       Appearance: He is well-developed. He is obese.   HENT:      Head: Normocephalic and atraumatic.   Eyes:      Pupils: Pupils are equal, round, and reactive to light.   Cardiovascular:      Rate and Rhythm: Normal rate and regular rhythm.      Heart sounds: Normal heart sounds.   Pulmonary:      Effort: Pulmonary effort is normal.      Breath sounds: Normal breath sounds.   Abdominal:      General: Bowel sounds are normal.      Palpations: Abdomen is soft.   Musculoskeletal:      Cervical back: Neck supple.      Comments: Back is nontender.  Straight leg raising is positive bilaterally at 45 degrees.  Tinel's and Phalen signs are negative bilaterally.   Skin:     General: Skin is warm and dry.   Neurological:      Mental Status: He is alert.   Psychiatric:         Mood and Affect: Mood normal.         Behavior: Behavior normal.         "

## 2024-10-15 ENCOUNTER — TELEPHONE (OUTPATIENT)
Dept: FAMILY MEDICINE CLINIC | Facility: CLINIC | Age: 64
End: 2024-10-15

## 2024-10-15 NOTE — TELEPHONE ENCOUNTER
PA for WEGOVY 0.25MG- SUBMITTED     via      [x][employer]-Case ID #     Office notes sent, clinical questions answered. Awaiting determination    Turnaround time for your insurance to make a decision on your Prior Authorization can take 7-21 business days.

## 2024-10-16 NOTE — TELEPHONE ENCOUNTER
PA for WEGOVY 0.25MG- APPROVED     Date(s) approved September 15, 2024 to May 13, 2025     Case #    Patient advised by          [x]MyChart Message  []Phone call   []LMOM  []L/M to call office as no active Communication consent on file  []Unable to leave detailed message as VM not approved on Communication consent       Pharmacy advised by    [x]Fax  []Phone call

## 2024-10-21 ENCOUNTER — TELEPHONE (OUTPATIENT)
Age: 64
End: 2024-10-21

## 2024-10-21 DIAGNOSIS — G47.00 INSOMNIA, UNSPECIFIED TYPE: ICD-10-CM

## 2024-10-21 DIAGNOSIS — M10.9 GOUT, UNSPECIFIED CAUSE, UNSPECIFIED CHRONICITY, UNSPECIFIED SITE: ICD-10-CM

## 2024-10-21 NOTE — TELEPHONE ENCOUNTER
Hawk from [facility] called stating patient is requesting a refill of allopurinol 100 mg, 90 day supply with 3 refills.  Not on current med list.     Please advise

## 2024-10-22 RX ORDER — TRAZODONE HYDROCHLORIDE 100 MG/1
100 TABLET ORAL
Qty: 30 TABLET | Refills: 5 | Status: SHIPPED | OUTPATIENT
Start: 2024-10-22

## 2024-10-22 NOTE — TELEPHONE ENCOUNTER
Spoke with pts wife. States she will call us back to let us know if pt is still taking this medication

## 2024-10-30 NOTE — TELEPHONE ENCOUNTER
[id number] sent Rx request for the following:      Requested Prescriptions   Pending Prescriptions Disp Refills    simvastatin (ZOCOR) 20 MG tablet [Pharmacy Med Name: Simvastatin 20 MG Oral Tablet] 90 tablet 0     Sig: TAKE 1 TABLET BY MOUTH AT BEDTIME   Last Prescription Date:   9/22/23  Last Fill Qty/Refills:         90, R-3    Last Office Visit:              10/1/24 (DM discussed)   Future Office visit:           5/1/25    Per LOV note:  Return in about 7 months (around 5/1/2025) for medicare wellness visit.     Prescription refilled per RN Medication Refill Policy.................... Anna Joshua RN ....................  10/30/2024   2:19 PM       Medication:  PDMP   07/19/2021  1  07/19/2021  OXYCODONE-ACETAMINOPHEN 5-325  50 0  8  BR DARIEL  [id number]  BARBARA ([id number])  0  46 88 MME  Other  PA    06/21/2021  1  06/21/2021  OXYCODONE-ACETAMINOPHEN 5-325  50 0  8  BR DARIEL  [id number]  BARBARA ([id number])  0  46 88 MME  Other  PA    05/18/2021  1  05/17/2021  OXYCODONE-ACETAMINOPHEN 5-325  50 0  8  BR DARIEL  [id number]  BARBARA ([id number])  0  46 88 MME  Other  PA      Active agreement on file -No

## 2024-11-04 DIAGNOSIS — G89.4 CHRONIC PAIN SYNDROME: ICD-10-CM

## 2024-11-04 RX ORDER — OXYCODONE AND ACETAMINOPHEN 10; 325 MG/1; MG/1
1 TABLET ORAL EVERY 4 HOURS PRN
Qty: 90 TABLET | Refills: 0 | Status: SHIPPED | OUTPATIENT
Start: 2024-11-04

## 2024-11-13 PROBLEM — Z00.00 HEALTH MAINTENANCE EXAMINATION: Status: RESOLVED | Noted: 2019-09-10 | Resolved: 2024-11-13

## 2024-11-15 DIAGNOSIS — E66.09 CLASS 2 OBESITY DUE TO EXCESS CALORIES WITHOUT SERIOUS COMORBIDITY WITH BODY MASS INDEX (BMI) OF 37.0 TO 37.9 IN ADULT: ICD-10-CM

## 2024-11-15 DIAGNOSIS — E66.812 CLASS 2 OBESITY DUE TO EXCESS CALORIES WITHOUT SERIOUS COMORBIDITY WITH BODY MASS INDEX (BMI) OF 37.0 TO 37.9 IN ADULT: ICD-10-CM

## 2024-11-15 RX ORDER — SEMAGLUTIDE 2.4 MG/.75ML
INJECTION, SOLUTION SUBCUTANEOUS
Qty: 3 ML | Refills: 1 | Status: SHIPPED | OUTPATIENT
Start: 2024-11-15

## 2024-11-15 RX ORDER — SEMAGLUTIDE 1 MG/.5ML
INJECTION, SOLUTION SUBCUTANEOUS
Qty: 2 ML | Refills: 0 | Status: SHIPPED | OUTPATIENT
Start: 2024-11-15 | End: 2025-01-15

## 2024-11-15 RX ORDER — SEMAGLUTIDE 1.7 MG/.75ML
INJECTION, SOLUTION SUBCUTANEOUS
Qty: 3 ML | Refills: 0 | Status: SHIPPED | OUTPATIENT
Start: 2024-11-15 | End: 2025-03-15

## 2024-11-15 RX ORDER — SEMAGLUTIDE 0.5 MG/.5ML
INJECTION, SOLUTION SUBCUTANEOUS
Qty: 2 ML | Refills: 0 | Status: SHIPPED | OUTPATIENT
Start: 2024-11-15

## 2024-11-19 ENCOUNTER — TELEPHONE (OUTPATIENT)
Age: 64
End: 2024-11-19

## 2024-11-19 NOTE — TELEPHONE ENCOUNTER
Pt wife called to let Dr Evans know that  is doing well on current dose of Wegovy 0.25mg/0.5ml , she would like him to be aware that  is having no issues and please have next dose called into [facility] in [place].

## 2024-11-22 ENCOUNTER — TELEPHONE (OUTPATIENT)
Age: 64
End: 2024-11-22

## 2024-11-22 DIAGNOSIS — E66.09 CLASS 2 OBESITY DUE TO EXCESS CALORIES WITHOUT SERIOUS COMORBIDITY WITH BODY MASS INDEX (BMI) OF 37.0 TO 37.9 IN ADULT: Primary | ICD-10-CM

## 2024-11-22 DIAGNOSIS — E66.812 CLASS 2 OBESITY DUE TO EXCESS CALORIES WITHOUT SERIOUS COMORBIDITY WITH BODY MASS INDEX (BMI) OF 37.0 TO 37.9 IN ADULT: Primary | ICD-10-CM

## 2024-11-22 NOTE — TELEPHONE ENCOUNTER
Wife reports that Wegovy is unavailable at all the local pharmacies she has contacted.     She asks if Sameer can receive a prescription for a different medication, such as Ozempic.    She states they leave for vacation on Tuesday, 11/26/2024, hope to have the prescription sent on Monday.

## 2024-11-25 NOTE — TELEPHONE ENCOUNTER
Wife called back today to follow up on alternative for Wegovy since it is on back order.    Wife aware the message has been sent to PCP and someone will call her back once the provider has addressed the message.

## 2024-11-26 ENCOUNTER — TELEPHONE (OUTPATIENT)
Dept: FAMILY MEDICINE CLINIC | Facility: CLINIC | Age: 64
End: 2024-11-26

## 2024-11-26 NOTE — TELEPHONE ENCOUNTER
Ozempic will not be covered with or without a prior authorization, Ozempic  is not FDA approved for obesity, prediabetes, etc. Ozempic  is only FDA Approved for Type 2 Diabetes and will only be Approved via a Prior Authorization if patient has a diagnosis of Type 2 Diabetes.

## 2024-12-04 DIAGNOSIS — E66.812 CLASS 2 OBESITY DUE TO EXCESS CALORIES WITHOUT SERIOUS COMORBIDITY WITH BODY MASS INDEX (BMI) OF 37.0 TO 37.9 IN ADULT: ICD-10-CM

## 2024-12-04 DIAGNOSIS — G89.4 CHRONIC PAIN SYNDROME: ICD-10-CM

## 2024-12-04 DIAGNOSIS — E66.09 CLASS 2 OBESITY DUE TO EXCESS CALORIES WITHOUT SERIOUS COMORBIDITY WITH BODY MASS INDEX (BMI) OF 37.0 TO 37.9 IN ADULT: ICD-10-CM

## 2024-12-08 RX ORDER — OXYCODONE AND ACETAMINOPHEN 10; 325 MG/1; MG/1
1 TABLET ORAL EVERY 4 HOURS PRN
Qty: 90 TABLET | Refills: 0 | Status: SHIPPED | OUTPATIENT
Start: 2024-12-08

## 2024-12-08 RX ORDER — SEMAGLUTIDE 1.7 MG/.75ML
INJECTION, SOLUTION SUBCUTANEOUS
Qty: 3 ML | Refills: 0 | Status: SHIPPED | OUTPATIENT
Start: 2024-12-08 | End: 2025-04-03

## 2024-12-08 RX ORDER — SEMAGLUTIDE 2.4 MG/.75ML
INJECTION, SOLUTION SUBCUTANEOUS
Qty: 3 ML | Refills: 0 | Status: SHIPPED | OUTPATIENT
Start: 2024-12-08

## 2024-12-09 DIAGNOSIS — E66.812 CLASS 2 OBESITY DUE TO EXCESS CALORIES WITHOUT SERIOUS COMORBIDITY WITH BODY MASS INDEX (BMI) OF 37.0 TO 37.9 IN ADULT: ICD-10-CM

## 2024-12-09 DIAGNOSIS — E66.09 CLASS 2 OBESITY DUE TO EXCESS CALORIES WITHOUT SERIOUS COMORBIDITY WITH BODY MASS INDEX (BMI) OF 37.0 TO 37.9 IN ADULT: ICD-10-CM

## 2024-12-09 RX ORDER — SEMAGLUTIDE 1 MG/.5ML
INJECTION, SOLUTION SUBCUTANEOUS
Qty: 2 ML | Refills: 0 | Status: SHIPPED | OUTPATIENT
Start: 2024-12-09 | End: 2025-02-08

## 2024-12-09 NOTE — TELEPHONE ENCOUNTER
Wife called back today stating [facility] in [place] only has the 1 ml. Wegovy in stock and patient has been without medication for awhile now. Asking if he can be increased and a new prescription for the 1 ml can be sent?    Please advise

## 2024-12-09 NOTE — TELEPHONE ENCOUNTER
[facility] asking that a script for  Wegovy 1 mg be sent over. Pharmacy states that Wegovy 1.7 mg is not available.

## 2024-12-13 DIAGNOSIS — G25.81 RLS (RESTLESS LEGS SYNDROME): ICD-10-CM

## 2024-12-13 RX ORDER — ROPINIROLE 1 MG/1
1 TABLET, FILM COATED ORAL
Qty: 90 TABLET | Refills: 1 | Status: SHIPPED | OUTPATIENT
Start: 2024-12-13

## 2024-12-27 DIAGNOSIS — K21.9 GASTROESOPHAGEAL REFLUX DISEASE: ICD-10-CM

## 2024-12-27 RX ORDER — OMEPRAZOLE 40 MG/1
40 CAPSULE, DELAYED RELEASE ORAL DAILY
Qty: 90 CAPSULE | Refills: 1 | Status: SHIPPED | OUTPATIENT
Start: 2024-12-27

## 2025-01-01 DIAGNOSIS — E66.09 CLASS 2 OBESITY DUE TO EXCESS CALORIES WITHOUT SERIOUS COMORBIDITY WITH BODY MASS INDEX (BMI) OF 37.0 TO 37.9 IN ADULT: ICD-10-CM

## 2025-01-01 DIAGNOSIS — E66.812 CLASS 2 OBESITY DUE TO EXCESS CALORIES WITHOUT SERIOUS COMORBIDITY WITH BODY MASS INDEX (BMI) OF 37.0 TO 37.9 IN ADULT: ICD-10-CM

## 2025-01-04 RX ORDER — SEMAGLUTIDE 1 MG/.5ML
INJECTION, SOLUTION SUBCUTANEOUS
Qty: 2 ML | Refills: 0 | Status: SHIPPED | OUTPATIENT
Start: 2025-01-04

## 2025-01-06 ENCOUNTER — TELEPHONE (OUTPATIENT)
Age: 65
End: 2025-01-06

## 2025-01-06 DIAGNOSIS — G89.4 CHRONIC PAIN SYNDROME: ICD-10-CM

## 2025-01-06 RX ORDER — OXYCODONE AND ACETAMINOPHEN 10; 325 MG/1; MG/1
1 TABLET ORAL EVERY 4 HOURS PRN
Qty: 90 TABLET | Refills: 0 | Status: SHIPPED | OUTPATIENT
Start: 2025-01-06

## 2025-01-06 NOTE — TELEPHONE ENCOUNTER
PA for Wegovy 1 mg  EXCLUDED from plan       Reason:(Screenshot if applicable)        Message sent to office clinical pool Yes

## 2025-01-06 NOTE — TELEPHONE ENCOUNTER
PA for Wegovy 1 MG/0.5ML SUBMITTED to     via    [x]CMM-KEY: [id number]  [][employer]-Case ID #   []Availity-Auth ID # NDC #   []Faxed to plan   []Other website   []Phone call Case ID #     [x]PA sent as URGENT    All office notes, labs and other pertaining documents and studies sent. Clinical questions answered. Awaiting determination from insurance company.     Turnaround time for your insurance to make a decision on your Prior Authorization can take 7-21 business days.

## 2025-01-09 NOTE — TELEPHONE ENCOUNTER
Spoke with wife she is going to contact insurance to see if prescription plan has changed for the new year.

## 2025-01-09 NOTE — TELEPHONE ENCOUNTER
Patient's wife calling for update regarding Wegovy prescription. States that it has been approved for the past several months and now they are not able to have it filled. Wife would like plan from Dr. Evans on what to do next. Please advise.

## 2025-01-17 ENCOUNTER — TELEPHONE (OUTPATIENT)
Dept: FAMILY MEDICINE CLINIC | Facility: CLINIC | Age: 65
End: 2025-01-17

## 2025-01-22 DIAGNOSIS — I10 HYPERTENSION, UNSPECIFIED TYPE: ICD-10-CM

## 2025-01-22 RX ORDER — LISINOPRIL 20 MG/1
20 TABLET ORAL DAILY
Qty: 90 TABLET | Refills: 1 | Status: SHIPPED | OUTPATIENT
Start: 2025-01-22

## 2025-01-31 ENCOUNTER — TELEPHONE (OUTPATIENT)
Age: 65
End: 2025-01-31

## 2025-02-01 ENCOUNTER — HOSPITAL ENCOUNTER (EMERGENCY)
Facility: HOSPITAL | Age: 65
Discharge: HOME | End: 2025-02-01
Attending: EMERGENCY MEDICINE
Payer: COMMERCIAL

## 2025-02-01 VITALS
RESPIRATION RATE: 17 BRPM | HEART RATE: 83 BPM | HEIGHT: 72 IN | BODY MASS INDEX: 31.95 KG/M2 | TEMPERATURE: 97.6 F | SYSTOLIC BLOOD PRESSURE: 147 MMHG | WEIGHT: 235.89 LBS | DIASTOLIC BLOOD PRESSURE: 83 MMHG | OXYGEN SATURATION: 94 %

## 2025-02-01 DIAGNOSIS — R11.2 NAUSEA & VOMITING: Primary | ICD-10-CM

## 2025-02-01 DIAGNOSIS — R19.7 DIARRHEA: ICD-10-CM

## 2025-02-01 LAB
ALBUMIN SERPL BCG-MCNC: 4.9 G/DL (ref 3.5–5)
ALP SERPL-CCNC: 70 U/L (ref 34–104)
ALT SERPL W P-5'-P-CCNC: 23 U/L (ref 7–52)
ANION GAP SERPL CALCULATED.3IONS-SCNC: 10 MMOL/L (ref 4–13)
AST SERPL W P-5'-P-CCNC: 18 U/L (ref 13–39)
BASOPHILS # BLD AUTO: 0.04 THOUSANDS/ΜL (ref 0–0.1)
BASOPHILS NFR BLD AUTO: 0 % (ref 0–1)
BILIRUB SERPL-MCNC: 1.02 MG/DL (ref 0.2–1)
BUN SERPL-MCNC: 17 MG/DL (ref 5–25)
CALCIUM SERPL-MCNC: 9.2 MG/DL (ref 8.4–10.2)
CHLORIDE SERPL-SCNC: 106 MMOL/L (ref 96–108)
CO2 SERPL-SCNC: 20 MMOL/L (ref 21–32)
CREAT SERPL-MCNC: 1.28 MG/DL (ref 0.6–1.3)
EOSINOPHIL # BLD AUTO: 0.38 THOUSAND/ΜL (ref 0–0.61)
EOSINOPHIL NFR BLD AUTO: 3 % (ref 0–6)
ERYTHROCYTE [DISTWIDTH] IN BLOOD BY AUTOMATED COUNT: 13.1 % (ref 11.6–15.1)
GFR SERPL CREATININE-BSD FRML MDRD: 59 ML/MIN/1.73SQ M
GLUCOSE SERPL-MCNC: 104 MG/DL (ref 65–140)
HCT VFR BLD AUTO: 47.2 % (ref 36.5–49.3)
HGB BLD-MCNC: 16.2 G/DL (ref 12–17)
IMM GRANULOCYTES # BLD AUTO: 0.05 THOUSAND/UL (ref 0–0.2)
IMM GRANULOCYTES NFR BLD AUTO: 0 % (ref 0–2)
LIPASE SERPL-CCNC: 16 U/L (ref 11–82)
LYMPHOCYTES # BLD AUTO: 1.82 THOUSANDS/ΜL (ref 0.6–4.47)
LYMPHOCYTES NFR BLD AUTO: 12 % (ref 14–44)
MCH RBC QN AUTO: 29.8 PG (ref 26.8–34.3)
MCHC RBC AUTO-ENTMCNC: 34.3 G/DL (ref 31.4–37.4)
MCV RBC AUTO: 87 FL (ref 82–98)
MONOCYTES # BLD AUTO: 1.48 THOUSAND/ΜL (ref 0.17–1.22)
MONOCYTES NFR BLD AUTO: 10 % (ref 4–12)
NEUTROPHILS # BLD AUTO: 11.31 THOUSANDS/ΜL (ref 1.85–7.62)
NEUTS SEG NFR BLD AUTO: 75 % (ref 43–75)
NRBC BLD AUTO-RTO: 0 /100 WBCS
PLATELET # BLD AUTO: 329 THOUSANDS/UL (ref 149–390)
PMV BLD AUTO: 9.2 FL (ref 8.9–12.7)
POTASSIUM SERPL-SCNC: 3.6 MMOL/L (ref 3.5–5.3)
PROT SERPL-MCNC: 7.8 G/DL (ref 6.4–8.4)
RBC # BLD AUTO: 5.43 MILLION/UL (ref 3.88–5.62)
SODIUM SERPL-SCNC: 136 MMOL/L (ref 135–147)
WBC # BLD AUTO: 15.08 THOUSAND/UL (ref 4.31–10.16)

## 2025-02-01 PROCEDURE — 99284 EMERGENCY DEPT VISIT MOD MDM: CPT | Performed by: EMERGENCY MEDICINE

## 2025-02-01 PROCEDURE — 80053 COMPREHEN METABOLIC PANEL: CPT | Performed by: EMERGENCY MEDICINE

## 2025-02-01 PROCEDURE — 99283 EMERGENCY DEPT VISIT LOW MDM: CPT

## 2025-02-01 PROCEDURE — 36415 COLL VENOUS BLD VENIPUNCTURE: CPT | Performed by: EMERGENCY MEDICINE

## 2025-02-01 PROCEDURE — 85025 COMPLETE CBC W/AUTO DIFF WBC: CPT | Performed by: EMERGENCY MEDICINE

## 2025-02-01 PROCEDURE — 96374 THER/PROPH/DIAG INJ IV PUSH: CPT

## 2025-02-01 PROCEDURE — 83690 ASSAY OF LIPASE: CPT | Performed by: EMERGENCY MEDICINE

## 2025-02-01 RX ORDER — ONDANSETRON 4 MG/1
4 TABLET, FILM COATED ORAL EVERY 6 HOURS
Qty: 12 TABLET | Refills: 0 | Status: SHIPPED | OUTPATIENT
Start: 2025-02-01

## 2025-02-01 RX ORDER — SODIUM CHLORIDE 9 MG/ML
1000 INJECTION, SOLUTION INTRAVENOUS ONCE
Status: COMPLETED | OUTPATIENT
Start: 2025-02-01 | End: 2025-02-01

## 2025-02-01 RX ORDER — ONDANSETRON 2 MG/ML
4 INJECTION INTRAMUSCULAR; INTRAVENOUS ONCE
Status: COMPLETED | OUTPATIENT
Start: 2025-02-01 | End: 2025-02-01

## 2025-02-01 RX ADMIN — SODIUM CHLORIDE 1000 ML/HR: 0.9 INJECTION, SOLUTION INTRAVENOUS at 19:12

## 2025-02-01 RX ADMIN — ONDANSETRON 4 MG: 2 INJECTION INTRAMUSCULAR; INTRAVENOUS at 19:19

## 2025-02-01 NOTE — Clinical Note
Sameer Cali was seen and treated in our emergency department on 2/1/2025.                Diagnosis: seen in ED    Sameer  .    He may return on this date: 02/10/2025         If you have any questions or concerns, please don't hesitate to call.      Soledad Sawant MD    ______________________________           _______________          _______________  Hospital Representative                              Date                                Time

## 2025-02-02 DIAGNOSIS — G89.4 CHRONIC PAIN SYNDROME: ICD-10-CM

## 2025-02-02 NOTE — ED PROVIDER NOTES
Time reflects when diagnosis was documented in both MDM as applicable and the Disposition within this note       Time User Action Codes Description Comment    2/1/2025  8:43 PM Soledad Sawant [R11.2] Nausea & vomiting     2/1/2025  8:43 PM Soledad Sawant [R19.7] Diarrhea           ED Disposition       ED Disposition   Discharge    Condition   Stable    Date/Time   Sat Feb 1, 2025  8:43 PM    Comment   Sameer Sernamyriam discharge to home/self care.                   Assessment & Plan       Medical Decision Making  Amount and/or Complexity of Data Reviewed  Labs: ordered.    Risk  Prescription drug management.         Labs unremarkable and reassuring, discussed holding off on further doses of his GLP-1, Zofran as needed for nausea.    Medications   sodium chloride 0.9 % infusion (0 mL/hr Intravenous Stopped 2/1/25 1935)   ondansetron (ZOFRAN) injection 4 mg (4 mg Intravenous Given 2/1/25 1919)       ED Risk Strat Scores                          SBIRT 20yo+      Flowsheet Row Most Recent Value   Initial Alcohol Screen: US AUDIT-C     1. How often do you have a drink containing alcohol? 0 Filed at: 02/01/2025 1754   2. How many drinks containing alcohol do you have on a typical day you are drinking?  0 Filed at: 02/01/2025 1754   3a. Male UNDER 65: How often do you have five or more drinks on one occasion? 0 Filed at: 02/01/2025 1754   Audit-C Score 0 Filed at: 02/01/2025 1754   LUIS: How many times in the past year have you...    Used an illegal drug or used a prescription medication for non-medical reasons? Never Filed at: 02/01/2025 1754                            History of Present Illness       Chief Complaint   Patient presents with    Allergic Reaction     Pt thinks he is having a bad reaction to a new medication he started. Pt has had N/V/D for a whole week now        Past Medical History:   Diagnosis Date    Arthritis     Gout     Hypertension       Past Surgical History:   Procedure Laterality Date    BACK  SURGERY      Discs    CATARACT EXTRACTION      KNEE SURGERY      [place] ARTHRP ACETBLR/PROX FEM PROSTC AGRFT/ALGRFT Right 10/13/2022    Procedure: ARTHROPLASTY HIP TOTAL;  Surgeon: Sigrid Salmon MD;  Location: [facility];  Service: Orthopedics    ROTATOR CUFF REPAIR        Family History   Problem Relation Age of Onset    Esophageal cancer Father     Lung cancer Father     Pancreatic cancer Father     Thyroid nodules Father     Stroke Family       Social History     Tobacco Use    Smoking status: Former     Types: Cigars     Start date:      Quit date:      Years since quittin.1    Smokeless tobacco: Never   Vaping Use    Vaping status: Never Used   Substance Use Topics    Alcohol use: Yes     Alcohol/week: 1.0 standard drink of alcohol     Types: 1 Cans of beer per week     Comment: occasional    Drug use: No      E-Cigarette/Vaping    E-Cigarette Use Never User       E-Cigarette/Vaping Substances    Nicotine No     THC No     CBD No     Flavoring No     Other No     Unknown No       I have reviewed and agree with the history as documented.     HPI  64-year-old male past medical history of obesity on Wegovy presents with nausea, vomiting, diarrhea for the past week.  He notes a recent dose increase of his medication and feels that this is the cause.  No abdominal pain.  Review of Systems   Constitutional:  Negative for chills and fever.   HENT:  Negative for dental problem and ear pain.    Eyes:  Negative for pain and redness.   Respiratory:  Negative for cough and shortness of breath.    Cardiovascular:  Negative for chest pain and palpitations.   Gastrointestinal:  Positive for diarrhea, nausea and vomiting. Negative for abdominal pain.   Endocrine: Negative for polydipsia and polyphagia.   Genitourinary:  Negative for dysuria and frequency.   Musculoskeletal:  Negative for arthralgias and joint swelling.   Skin:  Negative for color change and rash.   Neurological:  Negative for dizziness and  headaches.   Psychiatric/Behavioral:  Negative for behavioral problems and confusion.    All other systems reviewed and are negative.          Objective       ED Triage Vitals [02/01/25 1752]   Temperature Pulse Blood Pressure Respirations SpO2 Patient Position - Orthostatic VS   97.6 °F (36.4 °C) 91 134/91 18 97 % Sitting      Temp Source Heart Rate Source BP Location FiO2 (%) Pain Score    Temporal Monitor Left arm -- --      Vitals      Date and Time Temp Pulse SpO2 Resp BP Pain Score FACES Pain Rating User   02/01/25 1915 -- 83 94 % 17 147/83 -- -- KL   02/01/25 1752 97.6 °F (36.4 °C) 91 97 % 18 134/91 -- -- AA            Physical Exam  Vitals and nursing note reviewed.   Constitutional:       General: He is not in acute distress.     Appearance: He is well-developed. He is not diaphoretic.   HENT:      Head: Atraumatic.      Right Ear: External ear normal.      Left Ear: External ear normal.      Nose: Nose normal.   Eyes:      Conjunctiva/sclera: Conjunctivae normal.      Pupils: Pupils are equal, round, and reactive to light.   Neck:      Vascular: No JVD.   Cardiovascular:      Rate and Rhythm: Normal rate and regular rhythm.      Heart sounds: Normal heart sounds. No murmur heard.  Pulmonary:      Effort: Pulmonary effort is normal. No respiratory distress.      Breath sounds: Normal breath sounds. No wheezing.   Abdominal:      General: Bowel sounds are normal. There is no distension.      Palpations: Abdomen is soft.      Tenderness: There is no abdominal tenderness.   Musculoskeletal:         General: Normal range of motion.      Cervical back: Normal range of motion and neck supple.   Skin:     General: Skin is warm and dry.      Capillary Refill: Capillary refill takes less than 2 seconds.   Neurological:      Mental Status: He is alert and oriented to person, place, and time.      Cranial Nerves: No cranial nerve deficit.   Psychiatric:         Behavior: Behavior normal.         Results Reviewed        Procedure Component Value Units Date/Time    Comprehensive metabolic panel [[id number]]  (Abnormal) Collected: 02/01/25 1920    Lab Status: Final result Specimen: Blood from Arm, Right Updated: 02/01/25 1951     Sodium 136 mmol/L      Potassium 3.6 mmol/L      Chloride 106 mmol/L      CO2 20 mmol/L      ANION GAP 10 mmol/L      BUN 17 mg/dL      Creatinine 1.28 mg/dL      Glucose 104 mg/dL      Calcium 9.2 mg/dL      AST 18 U/L      ALT 23 U/L      Alkaline Phosphatase 70 U/L      Total Protein 7.8 g/dL      Albumin 4.9 g/dL      Total Bilirubin 1.02 mg/dL      eGFR 59 ml/min/1.73sq m     Narrative:      National Kidney Disease Foundation guidelines for Chronic Kidney Disease (CKD):     Stage 1 with normal or high GFR (GFR > 90 mL/min/1.73 square meters)    Stage 2 Mild CKD (GFR = 60-89 mL/min/1.73 square meters)    Stage 3A Moderate CKD (GFR = 45-59 mL/min/1.73 square meters)    Stage 3B Moderate CKD (GFR = 30-44 mL/min/1.73 square meters)    Stage 4 Severe CKD (GFR = 15-29 mL/min/1.73 square meters)    Stage 5 End Stage CKD (GFR <15 mL/min/1.73 square meters)  Note: GFR calculation is accurate only with a steady state creatinine    Lipase [[id number]]  (Normal) Collected: 02/01/25 1920    Lab Status: Final result Specimen: Blood from Arm, Right Updated: 02/01/25 1951     Lipase 16 u/L     CBC and differential [[id number]]  (Abnormal) Collected: 02/01/25 1920    Lab Status: Final result Specimen: Blood from Arm, Right Updated: 02/01/25 1928     WBC 15.08 Thousand/uL      RBC 5.43 Million/uL      Hemoglobin 16.2 g/dL      Hematocrit 47.2 %      MCV 87 fL      MCH 29.8 pg      MCHC 34.3 g/dL      RDW 13.1 %      MPV 9.2 fL      Platelets 329 Thousands/uL      nRBC 0 /100 WBCs      Segmented % 75 %      Immature Grans % 0 %      Lymphocytes % 12 %      Monocytes % 10 %      Eosinophils Relative 3 %      Basophils Relative 0 %      Absolute Neutrophils 11.31 Thousands/µL      Absolute Immature Grans 0.05 Thousand/uL       Absolute Lymphocytes 1.82 Thousands/µL      Absolute Monocytes 1.48 Thousand/µL      Eosinophils Absolute 0.38 Thousand/µL      Basophils Absolute 0.04 Thousands/µL             No orders to display       Procedures    ED Medication and Procedure Management   Prior to Admission Medications   Prescriptions Last Dose Informant Patient Reported? Taking?   Melatonin 1 MG CAPS  Self Yes No   Sig: Take 1 mg by mouth in the morning   Multiple Vitamins-Minerals (multivitamin with minerals) tablet  Self No No   Sig: Take 1 tablet by mouth daily   Semaglutide-Weight Management (Wegovy) 0.5 MG/0.5ML   No No   Sig: Inject 0.5 mg under the skin weekly   Semaglutide-Weight Management (Wegovy) 1.7 MG/0.75ML   No No   Sig: Inject 1.7 mg under the skin weekly   Semaglutide-Weight Management (Wegovy) 2.4 MG/0.75ML   No No   Sig: Inject 2.4 mg under the skin weekly   Wegovy 1 MG/0.5ML   No No   Sig: Inject 1 mg under the skin weekly   colchicine (COLCRYS) 0.6 mg tablet   No No   Sig: TAKE 1 TABLET TWICE A DAY AND EVERY 3 HOURS AS NEEDED FOR GOUT AS DIRECTED   indomethacin (INDOCIN) 50 mg capsule   No No   Sig: Take 1 capsule (50 mg total) by mouth 3 (three) times a day with meals   levocetirizine (XYZAL) 5 MG tablet   No No   Sig: Take 1 tablet (5 mg total) by mouth every evening   lisinopril (ZESTRIL) 20 mg tablet   No No   Sig: TAKE 1 TABLET DAILY   naproxen (NAPROSYN) 500 mg tablet   Yes No   omeprazole (PriLOSEC) 40 MG capsule   No No   Sig: TAKE 1 CAPSULE DAILY   oxyCODONE-acetaminophen (Percocet)  mg per tablet   No No   Sig: Take 1 tablet by mouth every 4 (four) hours as needed for severe pain Max Daily Amount: 6 tablets   rOPINIRole (REQUIP) 1 mg tablet   No No   Sig: TAKE 1 TABLET DAILY AT BEDTIME   traZODone (DESYREL) 100 mg tablet   No No   Sig: Take 1 tablet (100 mg total) by mouth daily at bedtime May increase to 200mg and 300 mg prn      Facility-Administered Medications Last Administration Doses Remaining    cyanocobalamin injection 1,000 mcg 5/11/2018  4:43 PM         Discharge Medication List as of 2/1/2025  8:44 PM        START taking these medications    Details   ondansetron (ZOFRAN) 4 mg tablet Take 1 tablet (4 mg total) by mouth every 6 (six) hours, Starting Sat 2/1/2025, Normal           CONTINUE these medications which have NOT CHANGED    Details   colchicine (COLCRYS) 0.6 mg tablet TAKE 1 TABLET TWICE A DAY AND EVERY 3 HOURS AS NEEDED FOR GOUT AS DIRECTED, Normal      indomethacin (INDOCIN) 50 mg capsule Take 1 capsule (50 mg total) by mouth 3 (three) times a day with meals, Starting Mon 5/6/2024, Normal      levocetirizine (XYZAL) 5 MG tablet Take 1 tablet (5 mg total) by mouth every evening, Starting Thu 7/13/2023, Normal      lisinopril (ZESTRIL) 20 mg tablet TAKE 1 TABLET DAILY, Starting Wed 1/22/2025, Normal      Melatonin 1 MG CAPS Take 1 mg by mouth in the morning, Historical Med      Multiple Vitamins-Minerals (multivitamin with minerals) tablet Take 1 tablet by mouth daily, Starting Tue 11/8/2022, Normal      naproxen (NAPROSYN) 500 mg tablet Historical Med      omeprazole (PriLOSEC) 40 MG capsule TAKE 1 CAPSULE DAILY, Starting Fri 12/27/2024, Normal      oxyCODONE-acetaminophen (Percocet)  mg per tablet Take 1 tablet by mouth every 4 (four) hours as needed for severe pain Max Daily Amount: 6 tablets, Starting Mon 1/6/2025, Normal      rOPINIRole (REQUIP) 1 mg tablet TAKE 1 TABLET DAILY AT BEDTIME, Starting Fri 12/13/2024, Normal      Semaglutide-Weight Management (Wegovy) 0.5 MG/0.5ML Inject 0.5 mg under the skin weekly, Normal      Semaglutide-Weight Management (Wegovy) 1.7 MG/0.75ML Inject 1.7 mg under the skin weekly, Normal      Semaglutide-Weight Management (Wegovy) 2.4 MG/0.75ML Inject 2.4 mg under the skin weekly, Normal      traZODone (DESYREL) 100 mg tablet Take 1 tablet (100 mg total) by mouth daily at bedtime May increase to 200mg and 300 mg prn, Starting Tue 10/22/2024, Normal       Wegovy 1 MG/0.5ML Inject 1 mg under the skin weekly, Normal           No discharge procedures on file.  ED SEPSIS DOCUMENTATION   Time reflects when diagnosis was documented in both MDM as applicable and the Disposition within this note       Time User Action Codes Description Comment    2/1/2025  8:43 PM Soledad Sawant Add [R11.2] Nausea & vomiting     2/1/2025  8:43 PM Soledad Sawant [R19.7] Diarrhea                  Soledad Sawant MD  02/01/25 2262

## 2025-02-03 RX ORDER — OXYCODONE AND ACETAMINOPHEN 10; 325 MG/1; MG/1
1 TABLET ORAL EVERY 4 HOURS PRN
Qty: 90 TABLET | Refills: 0 | Status: SHIPPED | OUTPATIENT
Start: 2025-02-03

## 2025-02-03 NOTE — TELEPHONE ENCOUNTER
Left message to advise patient that Dr. Evans's only works Monday and Tuesday as of right now. He can call back and we can try to get him in to discuss recent health issues.

## 2025-02-03 NOTE — TELEPHONE ENCOUNTER
Pt called back, stating that he does not want to continue with the Wegovy at this time. He states that he was extremely ill last week from taking his dose. Was dehydrated, vomiting and having diarrhea with sulfur burps. He was seen in ED on 2/1 and given IV fluids. He states he will revisit the medication at a future date. He is also complaining of a Gout flare. He would like to see Dr Evans for an appointment to discuss recent health issues. He is available on Thursday and Friday but would only like to see Dr Evans. No available appointments at this time. Please advise if able to schedule patient.

## 2025-02-06 ENCOUNTER — NURSE TRIAGE (OUTPATIENT)
Age: 65
End: 2025-02-06

## 2025-02-06 NOTE — TELEPHONE ENCOUNTER
"Pt called stating that he started to have right knee pain on Monday. He started taking his medication for Gout but the pain did not go away. He believes it is bursitis in which he has had before. He states his knee is swollen, not red and painful when he walks or goes up steps. Elizabeth any SOB, Chest pain or calf pain, no fever. Advised appointment Scheduled with Farzaneh PORTILLO tomorrow at 1120.   Reason for Disposition   Swollen knee joint (no fever or redness)    Answer Assessment - Initial Assessment Questions  1. LOCATION and RADIATION: \"Where is the pain located?\"       Pain is in his right knee does not raidiate  2. QUALITY: \"What does the pain feel like?\"  (e.g., sharp, dull, aching, burning)      Dull achey pain  3. SEVERITY: \"How bad is the pain?\" \"What does it keep you from doing?\"   (Scale 1-10; or mild, moderate, severe)      It is hard to walk but he is able to, just states its frustrating rates it as a 4-5/10.  4. ONSET: \"When did the pain start?\" \"Does it come and go, or is it there all the time?\"      Started on Monday, thought it was gout but did not get better with gout medication  5. RECURRENT: \"Have you had this pain before?\" If Yes, ask: \"When, and what happened then?\"      Has happened before  6. SETTING: \"Has there been any recent work, exercise or other activity that involved that part of the body?\"       no  7. AGGRAVATING FACTORS: \"What makes the knee pain worse?\" (e.g., walking, climbing stairs, running)      Walking and going up stairs  8. ASSOCIATED SYMPTOMS: \"Is there any swelling or redness of the knee?\"      Slight swelling no redness    9. OTHER SYMPTOMS: \"Do you have any other symptoms?\" (e.g., chest pain, difficulty breathing, fever, calf pain)      No chest pain, SOB , fever chills or calf pain    Protocols used: Knee Pain-Adult-[place]    "

## 2025-02-07 ENCOUNTER — OFFICE VISIT (OUTPATIENT)
Dept: FAMILY MEDICINE CLINIC | Facility: CLINIC | Age: 65
End: 2025-02-07
Payer: COMMERCIAL

## 2025-02-07 VITALS
WEIGHT: 245 LBS | DIASTOLIC BLOOD PRESSURE: 78 MMHG | BODY MASS INDEX: 33.18 KG/M2 | SYSTOLIC BLOOD PRESSURE: 126 MMHG | HEART RATE: 96 BPM | OXYGEN SATURATION: 99 % | HEIGHT: 72 IN

## 2025-02-07 DIAGNOSIS — M70.51 BURSITIS OF RIGHT KNEE, UNSPECIFIED BURSA: Primary | ICD-10-CM

## 2025-02-07 DIAGNOSIS — I10 PRIMARY HYPERTENSION: ICD-10-CM

## 2025-02-07 DIAGNOSIS — F11.20 CONTINUOUS OPIOID DEPENDENCE (HCC): ICD-10-CM

## 2025-02-07 PROCEDURE — 99214 OFFICE O/P EST MOD 30 MIN: CPT

## 2025-02-07 NOTE — ASSESSMENT & PLAN NOTE
-126/78 on Lisinopril 20 mg daily  -Discussed Voltaren interfering with antihypertensive effect of lisinopril, PT is going to monitor BP and signs/sx of increased BP

## 2025-02-07 NOTE — PROGRESS NOTES
Name: Sameer Cali      : 1960      MRN: [id number]  Encounter Provider: Farzaneh Casas PA-C  Encounter Date: 2025   Encounter department: [facility] [address] [place]  :  Assessment & Plan  Bursitis of right knee, unspecified bursa  -Right knee pain, swelling, some warmth x 3 days   -PT thought it was gout flare, colchicine did not help  -He has hx of bursitis and osteoarthritis   -He works on his feet all day, has been using ice and OTC aleve with relief. He has been taking Percocet for daily pain syndrome  -On exam, swelling on medial side of knee with tenderness. No erythema, warmth, or deformities of knee. Full ROM  -Discussed with recent infection and elevated WBC on , cortisone shot is not recommended  -Recommended continuing with rest, ice, compression sleeve, and elevation  -Ordered Voltaren 50 mg EC BID, discussed monitoring GI/cardio side effects   -Placed referral to physical therapy and gave stretching exercises in AVS  Orders:    Ambulatory Referral to Physical Therapy; Future    diclofenac sodium (VOLTAREN) 50 mg EC tablet; Take 1 tablet (50 mg total) by mouth 2 (two) times a day    Primary hypertension  -126/78 on Lisinopril 20 mg daily  -Discussed Voltaren interfering with antihypertensive effect of lisinopril, PT is going to monitor BP and signs/sx of increased BP       Continuous opioid dependence (HCC)  -PT on Percocet Q 4 hrs PRN for chronic pain syndrome               History of Present Illness     HPI    Sameer presents to the office for evaluation of right knee pain x 3 days. He has history of gout, took indomethacin without relief. He has had past history of bursitis. He works on his feet all day. He has been using ice and aleve OTC and aleve. He has been taking Percocet for daily pain syndrome    Denies fever, chills, trauma to knee, tick bites, bug bites. He notes knee was warm yesterday. He is having trouble with ambulation. WBC on 2025 was  elevated 15.08 with 12% lymphocytes.       Review of Systems   Constitutional:  Negative for chills, fatigue and fever.   HENT:  Negative for congestion, ear pain, rhinorrhea, sinus pain and sore throat.    Eyes:  Negative for pain.   Respiratory:  Negative for cough and shortness of breath.    Cardiovascular:  Negative for chest pain and leg swelling.   Gastrointestinal:  Negative for abdominal pain, constipation, diarrhea, nausea and vomiting.   Genitourinary:  Negative for difficulty urinating, dysuria, frequency and urgency.   Musculoskeletal:  Negative for neck pain.        Right knee pain   Skin:  Negative for rash.   Neurological:  Negative for dizziness and headaches.   Psychiatric/Behavioral:  Negative for sleep disturbance.        Objective   /78 (BP Location: Left arm, Patient Position: Sitting, Cuff Size: Adult)   Pulse 96   Ht 6' (1.829 m)   Wt 111 kg (245 lb)   SpO2 99%   BMI 33.23 kg/m²      Physical Exam  Vitals reviewed.   Constitutional:       General: He is not in acute distress.     Appearance: Normal appearance.   HENT:      Head: Normocephalic and atraumatic.      Right Ear: External ear normal.      Left Ear: External ear normal.      Nose: Nose normal.      Mouth/Throat:      Mouth: Mucous membranes are moist.   Eyes:      Extraocular Movements: Extraocular movements intact.      Conjunctiva/sclera: Conjunctivae normal.   Cardiovascular:      Rate and Rhythm: Normal rate and regular rhythm.      Heart sounds: Normal heart sounds.   Pulmonary:      Effort: Pulmonary effort is normal.      Breath sounds: Normal breath sounds. No wheezing, rhonchi or rales.   Abdominal:      General: Bowel sounds are normal. There is no distension.      Palpations: Abdomen is soft.      Tenderness: There is no abdominal tenderness.   Musculoskeletal:      Cervical back: Neck supple.      Right lower leg: Swelling and tenderness present. No edema.      Left lower leg: Normal. No edema.         Legs:    Lymphadenopathy:      Cervical: No cervical adenopathy.   Skin:     General: Skin is warm.      Capillary Refill: Capillary refill takes less than 2 seconds.      Findings: No rash.   Neurological:      Mental Status: He is alert. Mental status is at baseline.           Farzaneh Casas PA-C  [facility]  2/7/2025 11:33 AM

## 2025-02-17 ENCOUNTER — RA CDI HCC (OUTPATIENT)
Dept: OTHER | Facility: HOSPITAL | Age: 65
End: 2025-02-17

## 2025-02-17 NOTE — PROGRESS NOTES
HCC coding opportunities       Chart reviewed, no opportunity found: CHART REVIEWED, NO OPPORTUNITY FOUND        Patients Insurance        Commercial Insurance: [employer]

## 2025-02-24 ENCOUNTER — OFFICE VISIT (OUTPATIENT)
Dept: FAMILY MEDICINE CLINIC | Facility: CLINIC | Age: 65
End: 2025-02-24
Payer: COMMERCIAL

## 2025-02-24 VITALS
SYSTOLIC BLOOD PRESSURE: 128 MMHG | OXYGEN SATURATION: 95 % | WEIGHT: 242 LBS | HEART RATE: 86 BPM | HEIGHT: 72 IN | BODY MASS INDEX: 32.78 KG/M2 | DIASTOLIC BLOOD PRESSURE: 72 MMHG

## 2025-02-24 DIAGNOSIS — M51.360 DEGENERATION OF INTERVERTEBRAL DISC OF LUMBAR REGION WITH DISCOGENIC BACK PAIN: ICD-10-CM

## 2025-02-24 DIAGNOSIS — R73.03 PREDIABETES: ICD-10-CM

## 2025-02-24 DIAGNOSIS — I10 PRIMARY HYPERTENSION: ICD-10-CM

## 2025-02-24 DIAGNOSIS — M77.8 TENDINITIS OF LEFT SHOULDER: ICD-10-CM

## 2025-02-24 DIAGNOSIS — E78.5 DYSLIPIDEMIA: ICD-10-CM

## 2025-02-24 DIAGNOSIS — G89.4 CHRONIC PAIN SYNDROME: ICD-10-CM

## 2025-02-24 DIAGNOSIS — M10.9 GOUT, UNSPECIFIED CAUSE, UNSPECIFIED CHRONICITY, UNSPECIFIED SITE: ICD-10-CM

## 2025-02-24 DIAGNOSIS — E66.9 OBESITY (BMI 30-39.9): ICD-10-CM

## 2025-02-24 DIAGNOSIS — Z72.0 TOBACCO USE: ICD-10-CM

## 2025-02-24 DIAGNOSIS — K21.9 GASTROESOPHAGEAL REFLUX DISEASE, UNSPECIFIED WHETHER ESOPHAGITIS PRESENT: ICD-10-CM

## 2025-02-24 DIAGNOSIS — Z12.5 SCREENING FOR PROSTATE CANCER: ICD-10-CM

## 2025-02-24 DIAGNOSIS — F11.20 CONTINUOUS OPIOID DEPENDENCE (HCC): Primary | ICD-10-CM

## 2025-02-24 PROBLEM — E66.09 CLASS 2 OBESITY DUE TO EXCESS CALORIES WITHOUT SERIOUS COMORBIDITY WITH BODY MASS INDEX (BMI) OF 37.0 TO 37.9 IN ADULT: Status: RESOLVED | Noted: 2024-10-14 | Resolved: 2025-02-24

## 2025-02-24 PROBLEM — E66.812 CLASS 2 OBESITY DUE TO EXCESS CALORIES WITHOUT SERIOUS COMORBIDITY WITH BODY MASS INDEX (BMI) OF 37.0 TO 37.9 IN ADULT: Status: RESOLVED | Noted: 2024-10-14 | Resolved: 2025-02-24

## 2025-02-24 PROCEDURE — 20610 DRAIN/INJ JOINT/BURSA W/O US: CPT | Performed by: FAMILY MEDICINE

## 2025-02-24 PROCEDURE — 96372 THER/PROPH/DIAG INJ SC/IM: CPT | Performed by: FAMILY MEDICINE

## 2025-02-24 PROCEDURE — 99214 OFFICE O/P EST MOD 30 MIN: CPT | Performed by: FAMILY MEDICINE

## 2025-02-24 RX ORDER — METHYLPREDNISOLONE ACETATE 80 MG/ML
0.5 INJECTION, SUSPENSION INTRA-ARTICULAR; INTRALESIONAL; INTRAMUSCULAR; SOFT TISSUE
Status: COMPLETED | OUTPATIENT
Start: 2025-02-24 | End: 2025-02-24

## 2025-02-24 RX ORDER — SEMAGLUTIDE 0.25 MG/.5ML
INJECTION, SOLUTION SUBCUTANEOUS
Qty: 2 ML | Refills: 0 | Status: SHIPPED | OUTPATIENT
Start: 2025-02-24 | End: 2025-03-03 | Stop reason: SDUPTHER

## 2025-02-24 RX ADMIN — METHYLPREDNISOLONE ACETATE 0.5 ML: 80 INJECTION, SUSPENSION INTRA-ARTICULAR; INTRALESIONAL; INTRAMUSCULAR; SOFT TISSUE at 12:40

## 2025-02-24 NOTE — PROGRESS NOTES
Assessment/Plan:    Return visit in 4 months with fasting blood prior to visit     Problem List Items Addressed This Visit       Acid reflux    Chronic pain syndrome    Continuous opioid dependence (HCC)    DDD (degenerative disc disease), lumbar    Dyslipidemia    Relevant Orders    CBC and differential    Comprehensive metabolic panel    Lipid panel    Gout    Hypertension - Primary    Obesity (BMI 30-39.9)    Relevant Medications    Semaglutide-Weight Management (Wegovy) 0.25 MG/0.5ML    Prediabetes    Relevant Orders    Hemoglobin A1C    Tendinitis of left shoulder    Tobacco use     Other Visit Diagnoses         Screening for prostate cancer        Relevant Orders    PSA, Total Screen              Subjective:      Patient ID: Sameer Cali is a 64 y.o. male.    Patient comes in for checkup.  He takes Percocet 10 mg 3 times daily for chronic low back pain.  He has opioid agreement on file and we are doing urine drug testing today.  He feels this allows him to stay active.  Most recently had problem with nausea vomiting and diarrhea secondary to stopping Wegovy and then restarting it at a higher dose.  He also complains of left shoulder pain.    Shoulder Pain         The following portions of the patient's history were reviewed and updated as appropriate:   Past Medical History:  He has a past medical history of Arthritis, Gout, and Hypertension.,  _______________________________________________________________________  Medical Problems:  does not have any pertinent problems on file.,  _______________________________________________________________________  Past Surgical History:   has a past surgical history that includes Cataract extraction; Knee surgery; Rotator cuff repair; Back surgery; and pr arthrp acetblr/prox fem prostc agrft/algrft (Right, 10/13/2022).,  _______________________________________________________________________  Family History:  family history includes Esophageal cancer in his father; Lung  cancer in his father; Pancreatic cancer in his father; Stroke in his family; Thyroid nodules in his father.,  _______________________________________________________________________  Social History:   reports that he quit smoking about 34 years ago. His smoking use included cigars. He started smoking about 43 years ago. He has never used smokeless tobacco. He reports current alcohol use of about 1.0 standard drink of alcohol per week. He reports that he does not use drugs.,  _______________________________________________________________________  Allergies:  has no known allergies..  _______________________________________________________________________  Current Outpatient Medications   Medication Sig Dispense Refill    Semaglutide-Weight Management (Wegovy) 0.25 MG/0.5ML Inject 0.25 mg under the skin weekly 2 mL 0    colchicine (COLCRYS) 0.6 mg tablet TAKE 1 TABLET TWICE A DAY AND EVERY 3 HOURS AS NEEDED FOR GOUT AS DIRECTED 200 tablet 1    diclofenac sodium (VOLTAREN) 50 mg EC tablet Take 1 tablet (50 mg total) by mouth 2 (two) times a day 60 tablet 2    levocetirizine (XYZAL) 5 MG tablet Take 1 tablet (5 mg total) by mouth every evening 90 tablet 0    lisinopril (ZESTRIL) 20 mg tablet TAKE 1 TABLET DAILY 90 tablet 1    Melatonin 1 MG CAPS Take 1 mg by mouth in the morning      Multiple Vitamins-Minerals (multivitamin with minerals) tablet Take 1 tablet by mouth daily 30 tablet 0    omeprazole (PriLOSEC) 40 MG capsule TAKE 1 CAPSULE DAILY 90 capsule 1    ondansetron (ZOFRAN) 4 mg tablet Take 1 tablet (4 mg total) by mouth every 6 (six) hours 12 tablet 0    oxyCODONE-acetaminophen (Percocet)  mg per tablet Take 1 tablet by mouth every 4 (four) hours as needed for severe pain Max Daily Amount: 6 tablets 90 tablet 0    rOPINIRole (REQUIP) 1 mg tablet TAKE 1 TABLET DAILY AT BEDTIME 90 tablet 1    traZODone (DESYREL) 100 mg tablet Take 1 tablet (100 mg total) by mouth daily at bedtime May increase to 200mg and  300 mg prn 30 tablet 5     Current Facility-Administered Medications   Medication Dose Route Frequency Provider Last Rate Last Admin    cyanocobalamin injection 1,000 mcg  1,000 mcg Intramuscular Q30 Days Sg Evans MD   1,000 mcg at 05/11/18 1643     _______________________________________________________________________  Review of Systems   Constitutional: Negative.    Respiratory: Negative.     Cardiovascular: Negative.    Musculoskeletal:  Positive for arthralgias and back pain.         Objective:  Vitals:    02/24/25 1234   BP: 128/72   Pulse: 86   SpO2: 95%   Weight: 110 kg (242 lb)   Height: 6' (1.829 m)     Body mass index is 32.82 kg/m².     Physical Exam  Constitutional:       Appearance: Normal appearance. He is well-developed.   HENT:      Head: Normocephalic and atraumatic.   Eyes:      Pupils: Pupils are equal, round, and reactive to light.   Cardiovascular:      Rate and Rhythm: Normal rate and regular rhythm.      Heart sounds: Normal heart sounds.   Pulmonary:      Effort: Pulmonary effort is normal.      Breath sounds: Normal breath sounds.   Musculoskeletal:         General: Normal range of motion.      Cervical back: Neck supple.      Comments: Painful abduction and flexion of left shoulder.  Tender lumbar paraspinal muscles.  Straight leg raising is positive bilaterally.   Skin:     General: Skin is warm and dry.   Neurological:      Mental Status: He is alert.   Psychiatric:         Mood and Affect: Mood normal.         Behavior: Behavior normal.       Large joint arthrocentesis: L glenohumeral  Universal Protocol:  procedure performed by consultantConsent: Verbal consent obtained.  Risks and benefits: risks, benefits and alternatives were discussed  Consent given by: patient  Patient identity confirmed: verbally with patient  Supporting Documentation  Indications: pain   Procedure Details  Location: shoulder - L glenohumeral  Needle size: 25 G  Ultrasound guidance: no  Approach:  posterior  Medications administered: 0.5 mL methylPREDNISolone acetate 80 mg/mL    Patient tolerance: patient tolerated the procedure well with no immediate complications  Dressing:  Sterile dressing applied

## 2025-03-03 ENCOUNTER — TELEPHONE (OUTPATIENT)
Age: 65
End: 2025-03-03

## 2025-03-03 DIAGNOSIS — G89.4 CHRONIC PAIN SYNDROME: ICD-10-CM

## 2025-03-03 DIAGNOSIS — E66.9 OBESITY (BMI 30-39.9): ICD-10-CM

## 2025-03-03 LAB

## 2025-03-03 NOTE — TELEPHONE ENCOUNTER
[facility]iley called, stated pharmacy is requesting Prior Auth for Semaglutide-Weight Management (Wegovy) 0.25 MG/0.5ML .    Please advise, thank you.

## 2025-03-03 NOTE — TELEPHONE ENCOUNTER
Please verify which dosage patient should be one, Wegovy 1 mg has been approved till 8/20/2025. According to patients medication list patient has been taking the wegovy 1.7 mg.

## 2025-03-04 RX ORDER — SEMAGLUTIDE 0.25 MG/.5ML
INJECTION, SOLUTION SUBCUTANEOUS
Qty: 2 ML | Refills: 0 | Status: SHIPPED | OUTPATIENT
Start: 2025-03-04

## 2025-03-04 RX ORDER — OXYCODONE AND ACETAMINOPHEN 10; 325 MG/1; MG/1
1 TABLET ORAL EVERY 4 HOURS PRN
Qty: 90 TABLET | Refills: 0 | Status: SHIPPED | OUTPATIENT
Start: 2025-03-04

## 2025-03-13 DIAGNOSIS — M10.9 GOUT, UNSPECIFIED CAUSE, UNSPECIFIED CHRONICITY, UNSPECIFIED SITE: ICD-10-CM

## 2025-03-13 RX ORDER — COLCHICINE 0.6 MG/1
TABLET ORAL
Qty: 200 TABLET | Refills: 1 | Status: SHIPPED | OUTPATIENT
Start: 2025-03-13

## 2025-04-06 DIAGNOSIS — G89.4 CHRONIC PAIN SYNDROME: ICD-10-CM

## 2025-04-06 DIAGNOSIS — G47.00 INSOMNIA, UNSPECIFIED TYPE: ICD-10-CM

## 2025-04-06 DIAGNOSIS — E66.9 OBESITY (BMI 30-39.9): ICD-10-CM

## 2025-04-06 RX ORDER — TRAZODONE HYDROCHLORIDE 100 MG/1
100 TABLET ORAL
Qty: 30 TABLET | Refills: 5 | Status: SHIPPED | OUTPATIENT
Start: 2025-04-06

## 2025-04-07 RX ORDER — SEMAGLUTIDE 0.25 MG/.5ML
INJECTION, SOLUTION SUBCUTANEOUS
Qty: 2 ML | Refills: 0 | Status: SHIPPED | OUTPATIENT
Start: 2025-04-07

## 2025-04-07 RX ORDER — OXYCODONE AND ACETAMINOPHEN 10; 325 MG/1; MG/1
1 TABLET ORAL EVERY 4 HOURS PRN
Qty: 90 TABLET | Refills: 0 | Status: SHIPPED | OUTPATIENT
Start: 2025-04-07

## 2025-05-03 DIAGNOSIS — G47.00 INSOMNIA, UNSPECIFIED TYPE: ICD-10-CM

## 2025-05-03 DIAGNOSIS — E66.9 OBESITY (BMI 30-39.9): ICD-10-CM

## 2025-05-03 DIAGNOSIS — G89.4 CHRONIC PAIN SYNDROME: ICD-10-CM

## 2025-05-05 ENCOUNTER — TELEPHONE (OUTPATIENT)
Age: 65
End: 2025-05-05

## 2025-05-05 RX ORDER — SEMAGLUTIDE 0.25 MG/.5ML
INJECTION, SOLUTION SUBCUTANEOUS
Qty: 2 ML | Refills: 0 | Status: SHIPPED | OUTPATIENT
Start: 2025-05-05

## 2025-05-05 RX ORDER — TRAZODONE HYDROCHLORIDE 100 MG/1
100 TABLET ORAL
Qty: 30 TABLET | Refills: 0 | OUTPATIENT
Start: 2025-05-05

## 2025-05-05 RX ORDER — OXYCODONE AND ACETAMINOPHEN 10; 325 MG/1; MG/1
1 TABLET ORAL EVERY 4 HOURS PRN
Qty: 90 TABLET | Refills: 0 | Status: SHIPPED | OUTPATIENT
Start: 2025-05-05

## 2025-05-22 ENCOUNTER — TELEPHONE (OUTPATIENT)
Dept: GASTROENTEROLOGY | Facility: CLINIC | Age: 65
End: 2025-05-22

## 2025-05-22 NOTE — TELEPHONE ENCOUNTER
"  Caller: Daxa Reyes \"BRIDGET\"    Relationship: Self    Best call back number:   Telephone Information:   Mobile [phone]       What was the call regarding: PT CALLED NEEDING TO SPEAK WITH CLINICAL STAFF REGARDING PT THYROID. PLEASE ADVISE.     " Notified pts wife

## 2025-05-30 DIAGNOSIS — G89.4 CHRONIC PAIN SYNDROME: ICD-10-CM

## 2025-05-30 RX ORDER — OXYCODONE AND ACETAMINOPHEN 10; 325 MG/1; MG/1
1 TABLET ORAL EVERY 4 HOURS PRN
Qty: 90 TABLET | Refills: 0 | Status: SHIPPED | OUTPATIENT
Start: 2025-05-30

## 2025-05-30 NOTE — TELEPHONE ENCOUNTER
Reason for call:   [x] Refill   [] Prior Auth  [] Other:     Office: [facility] [address]   [x] PCP/Provider - Sg Evans   [] Specialty/Provider -     Medication: oxyCODONE-acetaminophen (Percocet)     Dose/Frequency:  mg/ 1 tab every 4 hours PRN     Quantity: 90 tabs     Pharmacy: [facility]   Does the patient have enough for 3 days?   [] Yes   [x] No - Send as HP to POD    Mail Away Pharmacy   Does the patient have enough for 10 days?   [] Yes   [] No - Send as HP to POD

## 2025-06-11 DIAGNOSIS — G25.81 RLS (RESTLESS LEGS SYNDROME): ICD-10-CM

## 2025-06-11 RX ORDER — ROPINIROLE 1 MG/1
1 TABLET, FILM COATED ORAL
Qty: 90 TABLET | Refills: 1 | Status: SHIPPED | OUTPATIENT
Start: 2025-06-11

## 2025-06-13 DIAGNOSIS — M70.51 BURSITIS OF RIGHT KNEE, UNSPECIFIED BURSA: ICD-10-CM

## 2025-06-13 NOTE — TELEPHONE ENCOUNTER
Received fax for Diclofenac  diclofenac sodium (VOLTAREN) 50 mg EC tablet  refill    Script sent : 2/7/2025     Scanned to chart / media    Pended for approval

## 2025-06-17 ENCOUNTER — RA CDI HCC (OUTPATIENT)
Dept: OTHER | Facility: HOSPITAL | Age: 65
End: 2025-06-17

## 2025-06-17 NOTE — PROGRESS NOTES
HCC coding opportunities       Chart reviewed, no opportunity found: CHART REVIEWED, NO OPPORTUNITY FOUND        Patients Insurance        Commercial Insurance: [employer]

## 2025-06-25 DIAGNOSIS — K21.9 GASTROESOPHAGEAL REFLUX DISEASE: ICD-10-CM

## 2025-06-25 RX ORDER — OMEPRAZOLE 40 MG/1
40 CAPSULE, DELAYED RELEASE ORAL DAILY
Qty: 90 CAPSULE | Refills: 1 | Status: SHIPPED | OUTPATIENT
Start: 2025-06-25

## 2025-07-02 DIAGNOSIS — G89.4 CHRONIC PAIN SYNDROME: ICD-10-CM

## 2025-07-02 NOTE — TELEPHONE ENCOUNTER
Reason for call:   [x] Refill   [] Prior Auth  [] Other:     Office:   [x] PCP/Provider -  Sg Evans MD   [] Specialty/Provider -     Medication: (Percocet)  mg     Dose/Frequency: 1 tab every 4 hrs prn    Quantity: 90 tabs    Pharmacy: [id number] - [place], [place] - [address]      Local Pharmacy   Does the patient have enough for 3 days?   [] Yes   [x] No - Send as HP to POD    Mail Away Pharmacy   Does the patient have enough for 10 days?   [] Yes   [] No - Send as HP to POD

## 2025-07-05 RX ORDER — OXYCODONE AND ACETAMINOPHEN 10; 325 MG/1; MG/1
1 TABLET ORAL EVERY 4 HOURS PRN
Qty: 90 TABLET | Refills: 0 | Status: SHIPPED | OUTPATIENT
Start: 2025-07-05

## 2025-07-14 ENCOUNTER — RA CDI HCC (OUTPATIENT)
Dept: OTHER | Facility: HOSPITAL | Age: 65
End: 2025-07-14

## 2025-07-14 NOTE — PROGRESS NOTES
HCC coding opportunities       Chart reviewed, no opportunity found: CHART REVIEWED, NO OPPORTUNITY FOUND        Patients Insurance        Commercial Insurance: [email or web address]

## 2025-07-21 ENCOUNTER — OFFICE VISIT (OUTPATIENT)
Dept: FAMILY MEDICINE CLINIC | Facility: CLINIC | Age: 65
End: 2025-07-21
Payer: COMMERCIAL

## 2025-07-21 VITALS
SYSTOLIC BLOOD PRESSURE: 116 MMHG | HEART RATE: 83 BPM | OXYGEN SATURATION: 92 % | BODY MASS INDEX: 32.91 KG/M2 | HEIGHT: 72 IN | DIASTOLIC BLOOD PRESSURE: 78 MMHG | WEIGHT: 243 LBS

## 2025-07-21 DIAGNOSIS — M10.9 GOUT, UNSPECIFIED CAUSE, UNSPECIFIED CHRONICITY, UNSPECIFIED SITE: ICD-10-CM

## 2025-07-21 DIAGNOSIS — F11.20 CONTINUOUS OPIOID DEPENDENCE (HCC): ICD-10-CM

## 2025-07-21 DIAGNOSIS — K21.9 GASTROESOPHAGEAL REFLUX DISEASE, UNSPECIFIED WHETHER ESOPHAGITIS PRESENT: ICD-10-CM

## 2025-07-21 DIAGNOSIS — M51.360 DEGENERATION OF INTERVERTEBRAL DISC OF LUMBAR REGION WITH DISCOGENIC BACK PAIN: ICD-10-CM

## 2025-07-21 DIAGNOSIS — I10 PRIMARY HYPERTENSION: Primary | ICD-10-CM

## 2025-07-21 DIAGNOSIS — M77.8 TENDONITIS OF SHOULDER, RIGHT: ICD-10-CM

## 2025-07-21 DIAGNOSIS — G25.81 RLS (RESTLESS LEGS SYNDROME): ICD-10-CM

## 2025-07-21 DIAGNOSIS — M17.12 ARTHRITIS OF KNEE, LEFT: ICD-10-CM

## 2025-07-21 DIAGNOSIS — I10 HYPERTENSION, UNSPECIFIED TYPE: ICD-10-CM

## 2025-07-21 DIAGNOSIS — G89.4 CHRONIC PAIN SYNDROME: ICD-10-CM

## 2025-07-21 PROCEDURE — 99214 OFFICE O/P EST MOD 30 MIN: CPT | Performed by: FAMILY MEDICINE

## 2025-07-21 NOTE — PROGRESS NOTES
Name: Sameer Cali      : 1960      MRN: [id number]  Encounter Provider: Sg Evans MD  Encounter Date: 2025   Encounter department: [place] [id number] [address]    Assessment & Plan  Primary hypertension  Continue lisinopril 20 mg daily       Gastroesophageal reflux disease, unspecified whether esophagitis present  Continue Prilosec 40 mg daily       Degeneration of intervertebral disc of lumbar region with discogenic back pain         Chronic pain syndrome  Continue Percocet 10 mg 3 times a day       Continuous opioid dependence (HCC)         Gout, unspecified cause, unspecified chronicity, unspecified site         RLS (restless legs syndrome)  Continue ropinirole 1 mg nightly       Tendonitis of shoulder, right         Arthritis of knee, left              History of Present Illness     Patient comes in for checkup.  He takes Percocet 10 mg 3 times daily for chronic back pain along with other aches and pains such as his shoulders and knees.  This allows him to stay active and working.  He has opioid agreement on file and we did urine drug screen last visit.      Review of Systems   Constitutional: Negative.    Respiratory: Negative.     Cardiovascular: Negative.    Musculoskeletal:  Positive for arthralgias and back pain.     Past Medical History[1]  Past Surgical History[2]  Family History[3]  Social History[4]  Medications[5]  No Known Allergies  Immunization History   Administered Date(s) Administered    COVID-19 PFIZER VACCINE 0.3 ML IM 2021, 2021    INFLUENZA 2020, 2022    Influenza Recombinant Preservative Free Im 10/16/2024    Influenza, injectable, quadrivalent, preservative free 0.5 mL 2023    Influenza, recombinant, quadrivalent,injectable, preservative free 2022     Objective   /78   Pulse 83   Ht 6' (1.829 m)   Wt 110 kg (243 lb)   SpO2 92%   BMI 32.96 kg/m²     Physical Exam  Constitutional:       Appearance:  Normal appearance. He is well-developed.   HENT:      Head: Normocephalic and atraumatic.     Eyes:      Pupils: Pupils are equal, round, and reactive to light.       Cardiovascular:      Rate and Rhythm: Normal rate and regular rhythm.      Heart sounds: Normal heart sounds.   Pulmonary:      Effort: Pulmonary effort is normal.      Breath sounds: Normal breath sounds.   Abdominal:      General: Bowel sounds are normal.      Palpations: Abdomen is soft.     Musculoskeletal:      Cervical back: Neck supple.      Comments: Tender lumbar paraspinal muscles.  Straight leg raising is negative bilaterally.     Skin:     General: Skin is warm and dry.     Neurological:      Mental Status: He is alert.     Psychiatric:         Mood and Affect: Mood normal.         Behavior: Behavior normal.                [1]   Past Medical History:  Diagnosis Date    Arthritis     Gout     Hypertension    [2]   Past Surgical History:  Procedure Laterality Date    BACK SURGERY      Discs    CATARACT EXTRACTION      KNEE SURGERY      OK ARTHRP ACETBLR/PROX FEM PROSTC AGRFT/ALGRFT Right 10/13/2022    Procedure: ARTHROPLASTY HIP TOTAL;  Surgeon: Sigrid Salmon MD;  Location: [facility];  Service: Orthopedics    ROTATOR CUFF REPAIR     [3]   Family History  Problem Relation Name Age of Onset    Esophageal cancer Father      Lung cancer Father      Pancreatic cancer Father      Thyroid nodules Father      Stroke Family Grandmother    [4]   Social History  Tobacco Use    Smoking status: Former     Types: Cigars     Start date:      Quit date:      Years since quittin.5    Smokeless tobacco: Never   Vaping Use    Vaping status: Never Used   Substance and Sexual Activity    Alcohol use: Yes     Alcohol/week: 1.0 standard drink of alcohol     Types: 1 Cans of beer per week     Comment: occasional    Drug use: No    Sexual activity: Yes     Partners: Female   [5]   Current Outpatient Medications on File Prior to Visit   Medication Sig     colchicine (COLCRYS) 0.6 mg tablet TAKE 1 TABLET TWICE A DAY AND EVERY 3 HOURS AS NEEDED FOR GOUT AS DIRECTED    diclofenac sodium (VOLTAREN) 50 mg EC tablet Take 1 tablet (50 mg total) by mouth 2 (two) times a day    levocetirizine (XYZAL) 5 MG tablet Take 1 tablet (5 mg total) by mouth every evening    lisinopril (ZESTRIL) 20 mg tablet TAKE 1 TABLET DAILY    Melatonin 1 MG CAPS Take 1 mg by mouth in the morning    Multiple Vitamins-Minerals (multivitamin with minerals) tablet Take 1 tablet by mouth daily    omeprazole (PriLOSEC) 40 MG capsule TAKE 1 CAPSULE DAILY    ondansetron (ZOFRAN) 4 mg tablet Take 1 tablet (4 mg total) by mouth every 6 (six) hours    oxyCODONE-acetaminophen (Percocet)  mg per tablet Take 1 tablet by mouth every 4 (four) hours as needed for severe pain Max Daily Amount: 6 tablets    rOPINIRole (REQUIP) 1 mg tablet TAKE 1 TABLET DAILY AT BEDTIME    traZODone (DESYREL) 100 mg tablet Take 1 tablet (100 mg total) by mouth daily at bedtime May increase to 200mg and 300 mg prn    [DISCONTINUED] Semaglutide-Weight Management (Wegovy) 0.25 MG/0.5ML Inject 0.25 mg under the skin weekly

## 2025-07-22 RX ORDER — LISINOPRIL 20 MG/1
20 TABLET ORAL DAILY
Qty: 90 TABLET | Refills: 1 | Status: SHIPPED | OUTPATIENT
Start: 2025-07-22

## 2025-08-06 DIAGNOSIS — G89.4 CHRONIC PAIN SYNDROME: ICD-10-CM

## 2025-08-07 RX ORDER — OXYCODONE AND ACETAMINOPHEN 10; 325 MG/1; MG/1
1 TABLET ORAL EVERY 4 HOURS PRN
Qty: 90 TABLET | Refills: 0 | Status: SHIPPED | OUTPATIENT
Start: 2025-08-07

## 2025-08-08 ENCOUNTER — TELEPHONE (OUTPATIENT)
Dept: FAMILY MEDICINE CLINIC | Facility: CLINIC | Age: 65
End: 2025-08-08

## (undated) DEVICE — SUT VICRYL 0 CT-1 27 IN J260H

## (undated) DEVICE — HOOD: Brand: FLYTE, SURGICOOL

## (undated) DEVICE — DRAPE C-ARMOUR

## (undated) DEVICE — SCD SEQUENTIAL COMPRESSION COMFORT SLEEVE MEDIUM KNEE LENGTH: Brand: KENDALL SCD

## (undated) DEVICE — DRESSING MEPILEX AG BORDER POST-OP 4 X 10 IN

## (undated) DEVICE — BAG DECANTER

## (undated) DEVICE — SUT VICRYL 2-0 CT-1 27 IN J259H

## (undated) DEVICE — SOFT SILICONE HYDROCELLULAR FOAM DRESSING WITH LOCK AWAY LAYER: Brand: ALLEVYN LIFE M 12.9X12.9 CTN10

## (undated) DEVICE — ASTOUND SURGICAL GOWN, XXX LARGE, X-LONG: Brand: CONVERTORS

## (undated) DEVICE — TRAY FOLEY 16FR URIMETER SILICONE SURESTEP

## (undated) DEVICE — BETHLEHEM TOTAL HIP, KIT: Brand: CARDINAL HEALTH

## (undated) DEVICE — DRESSING MEPILEX AG BORDER 4 X 10 IN

## (undated) DEVICE — SYRINGE 50ML LL

## (undated) DEVICE — SUT FIBERWARE #5 1/2 CIRCLE CCS-1 38IN AR-7211

## (undated) DEVICE — HEAVY DUTY TABLE COVER: Brand: CONVERTORS

## (undated) DEVICE — LIGHT HANDLE COVER SLEEVE DISP BLUE STELLAR

## (undated) DEVICE — THE SIMPULSE SOLO SYSTEM WITH ULTREX RETRACTABLE SPLASH SHIELD TIP: Brand: SIMPULSE SOLO

## (undated) DEVICE — CAPIT HIP COP -CERAMIC ON POLY

## (undated) DEVICE — NEEDLE 18 G X 1 1/2 SAFETY

## (undated) DEVICE — 3M™ STERI-DRAPE™ U-DRAPE 1015: Brand: STERI-DRAPE™

## (undated) DEVICE — BASIC SINGLE BASIN 2-LF: Brand: MEDLINE INDUSTRIES, INC.

## (undated) DEVICE — IMPERVIOUS STOCKINETTE: Brand: DEROYAL

## (undated) DEVICE — COBAN 4 IN STERILE

## (undated) DEVICE — ELECTRODE BLADE MOD  E-Z CLEAN 6.5IN -0014M

## (undated) DEVICE — BIPOLAR SEALER 23-113-1 AQM 2.3: Brand: AQUAMANTYS™

## (undated) DEVICE — DRAPE C-ARM X-RAY

## (undated) DEVICE — 3M™ IOBAN™ 2 ANTIMICROBIAL INCISE DRAPE 6650EZ: Brand: IOBAN™ 2

## (undated) DEVICE — 3M™ STERI-STRIP™ REINFORCED ADHESIVE SKIN CLOSURES, R1547, 1/2 IN X 4 IN (12 MM X 100 MM), 6 STRIPS/ENVELOPE: Brand: 3M™ STERI-STRIP™

## (undated) DEVICE — SUT FIBERWIRE #2 1/2 CIRCLE T-5 38IN AR-7200

## (undated) DEVICE — DRAPE EQUIPMENT RF WAND

## (undated) DEVICE — HEWSON SUTURE RETRIEVER: Brand: HEWSON SUTURE RETRIEVER

## (undated) DEVICE — PLUMEPEN PRO 10FT

## (undated) DEVICE — GLOVE INDICATOR PI UNDERGLOVE SZ 9 BLUE

## (undated) DEVICE — 450 ML BOTTLE OF 0.05% CHLORHEXIDINE GLUCONATE IN 99.95% STERILE WATER FOR IRRIGATION, USP AND APPLICATOR.: Brand: IRRISEPT ANTIMICROBIAL WOUND LAVAGE

## (undated) DEVICE — CAPIT HIP UPCHRG  GRIPTION CUP

## (undated) DEVICE — DUAL CUT SAGITTAL BLADE

## (undated) DEVICE — GLOVE SRG BIOGEL 9

## (undated) DEVICE — SUT MONOCRYL 4-0 PS-2 18 IN Y496G